# Patient Record
(demographics unavailable — no encounter records)

---

## 2018-08-19 NOTE — HISTORY & PHYSICAL
History and Physical


History & Physicial


Hp dictated # [id number]











Kehinde Martin MD Aug 19, 2018 10:04

## 2018-08-19 NOTE — HISTORY AND PHYSICAL REPORT
DATE OF ADMISSION:  08/19/2018



DATE OF EVALUATION:  08/19/2018.



CHIEF COMPLAINT:  Chest pain.



HISTORY OF PRESENT ILLNESS:  This is a 70-year-old Armenian male, who was in

his usual state of health until last night.  The patient had chest pain,

which is pressure like.  It lasted about half an hour and he was also

short of breath.  He called paramedics, was brought in, and was admitted

with possible acute coronary syndrome.



PAST MEDICAL HISTORY:  No previous history of heart problems.  He does have

history of hypertension.



MEDICATIONS:  Does not remember.



ALLERGIES:  No known drug allergies.



SOCIAL HISTORY:  The patient smoked for a long time one pack every three

days and he lives by himself.  He does not have any history of alcohol

abuse.  He stopped smoking about two months ago.



REVIEW OF SYSTEMS:  Noncontributory.



PHYSICAL EXAMINATION:

GENERAL:  The patient is an elderly male, in no acute distress.

VITAL SIGNS:  Blood pressure 128/73, pulse 74, respiratory rate 16, and

temperature 97.4.

HEENT:  Pink conjunctivae.  Anicteric sclerae.

NECK:  Supple.

LUNGS:  Clear to auscultation.

HEART:  S1, S2 without murmurs or rubs.

ABDOMEN:  Soft and nontender.

EXTREMITIES:  No cyanosis or edema.



LABORATORY FINDINGS:  The CBC shows a WBC of 8600, hematocrit is 30.9,

hemoglobin is 10.3, and platelets 146,000.  Chemistry panel shows a serum

sodium 139, potassium 4, chloride 105, CO2 27, BUN is 21, and creatinine

is 0.9.  Troponins negative x2.



ASSESSMENT:  This is a 70-year-old Armenian male with history of hypertension

and a long history of smoking although he stopped two months ago, who

presents with atypical chest pain, possible acute coronary syndrome.



PLAN:  The patient will be on telemetry.  A stress test will be obtained.

Based on stress test, we can either go home if negative.  If positive, he

would need a coronary angiogram.









  ______________________________________________

  Kehinde Martin M.D.





DR:  KATIE

D:  08/19/2018 10:04

T:  08/19/2018 22:46

JOB#:  [id number]

CC:

## 2018-08-19 NOTE — DIAGNOSTIC IMAGING REPORT
EXAM:

  XR Chest, 1 View

 

CLINICAL HISTORY:

  CP

 

TECHNIQUE:

  Frontal view of the chest.

 

COMPARISON:

  No relevant prior studies available.

 

FINDINGS:

  Lungs:  Unremarkable.  No consolidation.

  Pleural space:  Unremarkable.  No pneumothorax.

  Heart:  Unremarkable.  No cardiomegaly.

  Mediastinum:  Unremarkable.

  Bones/joints:  Unremarkable.

 

IMPRESSION:     

  Normal chest x-ray.

## 2018-08-19 NOTE — EMERGENCY ROOM REPORT
History of Present Illness


General


Chief Complaint:  Chest Pain


Source:  Patient





Present Illness


HPI


This patient c/o mild-moderate ten minutes substernal-left chest pressure. At 

rest. No similar history. He was alone, resting. He called 911. Received 

aspirin and one sl ntg. Pain reduced to 3/10. No trauma, no fever, no shortness 

of breath, no nausea, no vomiting, no diarrhea, no abdominal pain, no syncope, 

LOC, dizziness, lightheadedness, headache. No leg pain/swelling. No travel 

history.


Allergies:  


Coded Allergies:  


     No Known Allergies (Unverified , 8/19/18)





Patient History


Limited by:  language barrier





Nursing Documentation-PMH


Hx Hypertension:  Yes





Review of Systems


Constitutional:  Reports: no symptoms


Eye:  Reports: no symptoms


ENT:  Reports: no symptoms


Respiratory:  Reports: no symptoms


Cardiovascular:  Reports: see HPI, chest pain


Gastrointestinal:  Reports: no symptoms


Genitourinary:  Reports: no symptoms


Musculoskeletal:  Reports: no symptoms


Skin:  Reports: no symptoms


Psychiatric:  Reports: no symptoms


Neurological:  Reports: no symptoms


Endocrine:  Reports: no symptoms


Hematologic/Lymphatic:  Reports: no symptoms


Allergic:  Reports: no symptoms


All Other Systems:  negative except mentioned in HPI





Physical Exam





Vital Signs








  Date Time  Temp Pulse Resp B/P (MAP) Pulse Ox O2 Delivery O2 Flow Rate FiO2


 


8/19/18 01:34 97.4 74 16 128/73 98 Room Air  





 97.3       








Sp02 EP Interpretation:  reviewed, normal


General Appearance:  normal inspection, well appearing, no apparent distress, 

alert, GCS 15, non-toxic, thin


Head:  normocephalic, atraumatic


Eyes:  bilateral eye normal inspection, bilateral eye PERRL, bilateral eye EOMI


ENT:  normal ENT inspection, hearing grossly normal, normal pharynx, no 

angioedema, normal voice, moist mucus membranes


Neck:  normal inspection, full range of motion, supple, no meningismus, no bony 

tend


Respiratory:  normal inspection, lungs clear, normal breath sounds, no rhonchi, 

no respiratory distress, no retraction, no accessory muscle use, no wheezing


Cardiovascular #1:  normal inspection, regular rate, rhythm, no edema


Gastrointestinal:  normal inspection, normal bowel sounds, non tender, soft, no 

mass, non-distended


Musculoskeletal:  gait/station normal, normal range of motion


Neurologic:  normal inspection, alert, oriented x3, responsive, motor strength/

tone normal


Psychiatric:  normal inspection, judgement/insight normal, memory normal


Suicide Risk Assessment:  


   Suicidal Ideation:  No


   Had intent to initiate attempt:  No


   Pt's plan for suicide attempt:  No


   Has means to complete attempt:  No


Skin:  normal inspection, normal color, no rash, warm/dry





Medical Decision Making


Diagnostic Impression:  


 Primary Impression:  


 Chest pain


 Additional Impression:  


 ACS (acute coronary syndrome)





EKG Diagnostic Results


EKG Time:  01:57


EP Interpretation:  NSR 74 RBBB no acute ischemia


Rate:  normal


Rhythm:  NSR


ST Segments:  no acute changes


ASA given to the pt in ED:  Yes





Rhythm Strip Diag. Results


Rhythm Strip Time:  01:58


EP Interpretation:  yes


Rate:  70


Rhythm:  NSR





Chest X-Ray Diagnostic Results


Chest X-Ray Diagnostic Results :  


   Chest X-Ray Ordered:  Yes


   # of Views/Limited/Complete:  1 View


   Indication:  Chest Pain


   EP Interpretation:  Yes


   Interpretation:  no consolidation, no effusion, no pneumothorax, no acute 

cardiopulmonary disease





Last Vital Signs








  Date Time  Temp Pulse Resp B/P (MAP) Pulse Ox O2 Delivery O2 Flow Rate FiO2


 


8/19/18 01:34 97.4 74 16 128/73 98 Room Air  





 97.3       








Status:  unchanged


Disposition:  ADMITTED AS INPATIENT


Scripts


Unable to Obtain Active Prescriptions or Reported Meds











Santiago Herzog M.D. Aug 19, 2018 01:58

## 2018-08-20 NOTE — DIAGNOSTIC IMAGING REPORT
Indications: Chest pain

 

Technique: Single day single isotope protocol utilized. Initially, resting images

obtained using IV administration 9.6 millicuries 99M technetium Myoview.

Subsequently, patient underwent  lexiscan stress testing. See cardiology report for

details. During adenosine infusion, IV administration 31.9 mCi 99 M technetium

Myoview. SPECT and planar images obtained. SPECT images gated to 8 phases of the

cardiac cycle were also obtained, and reformatted into cine images for evaluation of

ejection fraction.

 

Comparison: none

 

Findings: Presence or absence of symptoms during infusion is not reported on the

cardiology report. Per cardiology report, resting EKG demonstrates normal sinus

rhythm with right bundle-branch block. Presence or absence of symptoms during

infusion is not described on the cardiology report.  Imaging, no fixed nor reversible

post stress perfusion defects are demonstrated. Normal left ventricular chamber

size.. Calculated post stress ejection fraction 77%. No focal wall motion abnormality

demonstrated.

 

Impression: Nonischemic clinical response to pharmacologic stress, per cardiology

report

 

Nonischemic electrocardiographic response to pharmacologic stress, per cardiology

report

 

No imaging findings to suggest ischemia, at level of stress achieved.

 

Calculated post stress ejection fraction greater than 70%

## 2018-08-20 NOTE — GENERAL PROGRESS NOTE
Assessment/Plan


Problem List:  


(1) Chest pain


ICD Codes:  R07.9 - Chest pain, unspecified


SNOMED:  88561002


(2) HTN (hypertension)


ICD Codes:  I10 - Essential (primary) hypertension


SNOMED:  15353122


Assessment/Plan


await stress test


cardio consult





Subjective


Allergies:  


Coded Allergies:  


     No Known Allergies (Unverified , 8/19/18)


Subjective


no CP today





Objective





Last 24 Hour Vital Signs








  Date Time  Temp Pulse Resp B/P (MAP) Pulse Ox O2 Delivery O2 Flow Rate FiO2


 


8/20/18 12:00 97.9 77 20 127/73 (91) 97   





 97.9       


 


8/20/18 10:14 98.0       


 


8/20/18 09:15 98.0       


 


8/20/18 09:00      Room Air  


 


8/20/18 08:00  55      


 


8/20/18 08:00 97.3 61 18 128/75 (92) 97   





 97.3       


 


8/20/18 04:00 98.0 61 20 137/69 (91) 99   





 98.0       


 


8/20/18 04:00  84      


 


8/20/18 00:00 97.8 60 20 119/63 (81) 98   





 97.8       


 


8/19/18 23:34  58      


 


8/19/18 21:00      Room Air  


 


8/19/18 20:00 98.6 65 20 127/72 (90) 98   





 98.6       


 


8/19/18 19:46  63      


 


8/19/18 16:00 97.2 69 20 124/61 (82) 98   





 97.2       


 


8/19/18 16:00  67      

















Intake and Output  


 


 8/19/18 8/20/18





 19:00 07:00


 


Intake Total 360 ml 


 


Balance 360 ml 


 


  


 


Intake Oral 360 ml 


 


# Voids 2 3








Laboratory Tests


8/20/18 06:15: 


Troponin I 0.001, Triglycerides Level 54, Cholesterol Level 121, LDL 

Cholesterol 55, HDL Cholesterol 64H, Cholesterol/HDL Ratio 1.9L, Thyroid 

Stimulating Hormone (TSH) 1.789


Height (Feet):  5


Height (Inches):  9.00


Weight (Pounds):  136


Cardiovascular:  normal rate


Respiratory/Chest:  lungs clear


Edema:  no edema noted Generalized











Kehinde Martin MD Aug 20, 2018 12:34

## 2018-08-22 NOTE — DISCHARGE SUMMARY
Discharge Summary


Discharge Summary


_


DATE OF ADMISSION: 08/19/2018





DATE OF DISCHARGE: 08/20/2018





REASON FOR ADMISSION: 


70 years old male with  past medical history of hypertension, came to emergency 

room for evaluation due to chest pain .


He developed substernal left sided chest pressure at rest, lasting about 10 

minutes. 


He never had these symptoms in the past. h


Patient call  paramedic and  was brought  to the hospital ED for evaluation.  


Patient received by paramedics aspirin and nitroglycerin with significant 

relief.


Patient denied any trauma to chest.


No fevers or chills.


No shortness of breath, no nausea, no vomiting, no diarrhea, no abdominal pain, 

no syncope,blackout,  dizziness, lightheadedness, headache. 


No leg pain or swelling. 


No recent  travel history.


Upon evaluation vital signs were stable.  


EKG revealed normal sinus rhythm with right bundle branch block, no acute 

ischemic changes .


Troponin   negative.  


Chest x-ray revealed no acute cardiopulmonary pathology.  


Hemoglobin 10.8 hematocrit 30.9 , no leukocytosis .


INR 1.2 


AST 63 


BUN 21,  creatinine 0.9 .


Patient denied use of  alcohol, but admitted to  long standing history of 

smoking,  quit 2 months ago.  


Patient admitted with diagnoses of chest pain, rule out acute coronary syndrome.


 


HOSPITAL COURSE: 


Patient admitted to telemetry floor.  


Patient was started  on antiplatelet therapy with aspirin.


Serial troponin were negative.  


Telemetry was negative ,no acute ischemic changes.  


Lipid panel was negative.  


Patient undergone myocardial perfusion scan test,  which was nonischemic with 

calculated ejection fraction greater than 70%.  


Pain management was addressed , and pain was controlled.  


Blood pressure was closely monitored  and remained  stable.  


Patient was counseled on cardiac diet.  


Patient was counseled to continue abstinence from smoking.  


Patient was stable for discharge.


Due to rapid and unexpected improvement in patient's condition, the patient was 

discharged in one day. 





FINAL DIAGNOSES: 


A typical chest pain 


Hypertension


Long history of smoking ( quit 2 months ago) 





 


DISCHARGE INSTRUCTIONS:


Patient was discharged home. 


Follow up with primary care provider in one  to  two weeks.





I have been assigned to dictate discharge summary for this account. I was not 

involved in the patient's management.











Mariola Pedro NP Aug 22, 2018 12:27

## 2018-08-24 NOTE — CARDIOLOGY REPORT
--------------- APPROVED REPORT --------------





EKG Measurement

Heart [id number]

OK 138P45

[id number]

[id number]

[id number]





Normal sinus rhythm

Right bundle branch block

Abnormal ECG

## 2018-08-27 NOTE — PHYSICIAN QUERY
--------- THIS DOCUMENT IS A PERMANENT PART OF THE MEDICAL RECORD ---------



*****PLEASE COMPLETE THE DOCUMENT BEFORE SIGNING*****



Dear Dr. Kehinde Martin___________        Date __08/27/18___________

/CDS' Name _SAHIL Walker, CCS      



Exercise your independent professional judgment when responding to query.  
Questions asked do not imply particular answer is desired or expected.  We 
greatly appreciate your clarification on this issue.



Clinical Documentation States:

70 years old male with  past medical history of hypertension, came to emergency 
room for evaluation due to chest pain .

He developed substernal left sided chest pressure at rest, lasting about 10 
minutes. Patient received by paramedics aspirin and nitroglycerin with 
significant relief. Patient admitted with diagnoses of chest pain, rule out 
acute coronary syndrome.



Clinical Findings Show: 

 EKG = __NSR____      Troponin = __NEG__               



Please document the suspected etiology of Chest Pain:



a.Type:      []Cardiac      []Non-cardiac        []Unspecified



b.Etiology - cardiac    

   [] Aortic dissection                    []Mitral valve prolapsed      

   [] Acute myocardial infarction               []Spasm of coronary arteries   

   [] Coronary Artery Disease                    []Pericarditis   



c.Etiology - non-cardiac

   [] Anxiety                         []Pleurisy

   [] Cancer                         []Pneumonia, type _____________ 

   [x] Costochondritis              []Pneumothorax      

   [] GERD/Esophagitis            []Pulmonary embolism   

   [] Unable to determine   

   []Other:___________________



Please also document in your Progress Notes and/or Discharge Summary and 
indicate if the condition was present on admission.



________________________                                ______________________

Brenda OROZCO                                                     Date
MTDD

## 2019-08-07 NOTE — NUR
ER Nurse Note:



Report given to SVETLANA Hahn in tele for continuity of care. Pt a&ox4, VSS, stable. 
Pt expressed pain in lower legs, ERMD aware and meds given. Pt tolerated well. 
No skin issue noted. SLIV LT hand, patent. All belongings taken with pt. Money 
counted with SVETLANA Hahn at pt side.

## 2019-08-07 NOTE — NUR
NURSE NOTES:

Received report from SVETLANA Hahn. The patient is sleeping on the bed without acute distress or 
shortness of breath. The patient's bed in the lowest position, call light in reach, and fall 
and aspiration precaution reinforced. IV site on left hand 18G intact and patent. Will 
continue plan of care.

## 2019-08-07 NOTE — NUR
NURSE NOTES:

Received report from SVETLANA Mahoney. Patient in bed awake showing no signs of acute distress. 
Respiration even and non labored on RA. No sob noted. IV on left hand 18g sl patent and 
intact. Call light within reach, Bed side rails up x2. Ndiaye patent, intact and draining. 
Bed in lowest position, wheels locked and alarm on. All needs attended and met. Will 
continue plan of care.

## 2019-08-07 NOTE — NUR
NURSE NOTES:

Called Dr. Sanket Juarez's office who is the patient's primary physician. Called two times but 
voicemail was full and unable to reach them. Will try to call again. Will continue to 
monitor the patient.

## 2019-08-07 NOTE — NUR
ER Nurse Note:



Pt BIBA RA 29 from 7 11 c/o acute onset of chest pain. Pt stated he was at home 
when he felt pain 6/10 pain on his LT chest at 0200. Pt could not sleep it off 
and went to 711 and called 911. On route, EMS established 18 gauge IV line on 
LT hand; patent. Pt given nitro and ASA. Pt a&ox4, VSS, stable. Pt chest rise 
and fall noted, lung sounds clear. Will continue to montior.

## 2019-08-07 NOTE — CONSULTATION
DATE OF CONSULTATION:  08/07/2019

CARDIOLOGY CONSULTATION



CONSULTING PHYSICIAN:  Ray Whelan M.D.



REFERRING PHYSICIAN:  Kehinde Martin M.D.



REASON FOR REFERRAL:  Chest pain.



HISTORY OF PRESENT ILLNESS:  This is a 71-year-old gentleman who presented

to the hospital for one and a half months of constant pain in the left

shoulder, left arm, and left chest area and he was admitted here, I guess

a number of months ago, back in August of 2018 for episode of chest pain,

at which time he underwent a myocardial perfusion imaging that was

apparently negative.  He presents with the same pain because that is

constantly present in this area.  Relieving exacerbating factors were

_____ seem to be with movement of the left arm and left shoulder, but not

with walking.  When he walks, he has pain in his legs, but not in his

chest.  The pain may be worse in certain positions when he lies down, but

mostly when he moves his arm, and when he leaves the left arm flat, he has

no pain or improved pain.



PAST MEDICAL HISTORY:  Positive for high blood pressure.  Denies any

diabetes.  No history of high cholesterol.  No heart attack, cancer,

stroke, hepatitis, tuberculosis, asthma, or emphysema.  No ulcers.  No

kidney problems, liver problems, thyroid problems, anemia, HIV, or AIDS.

He indicates a number of years ago, he went to an emergency room and they

told him there is nothing wrong with his brain.



SOCIAL HISTORY:  He smokes about 4 to 5 cigarettes per day.  Denies any

alcohol and denies any drugs.



REVIEW OF SYSTEMS:  GASTROINTESTINAL:  He has no problems with his GI.

GENITOURINARY:  He had some problems with his urine.  PULMONARY:  Negative

.  CONSTITUTIONAL:  Negative.



PHYSICAL EXAMINATION:

GENERAL:  Shows to be elderly male, in no respiratory distress.

HEENT:  Unremarkable.

NECK:  Supple.  No jugular venous distention.  No abdominojugular reflux

noted.

LUNGS:  Clear to auscultation and percussion.

CARDIAC:  S1 is normal.  S2 is normal.  Regular rate and rhythm.  Systolic

ejection murmur is noted.  No heaves, thrills, gallops, or rubs are

noted.

ABDOMEN:  Soft, nontender.  Positive bowel sounds.

EXTREMITIES:  There is no clubbing, cyanosis, nor is there any

edema.

NEUROLOGICAL:  He is awake, alert, and responsive and in no apparent

respiratory distress.



On palpation of his chest wall, there is no pain, but on abduction and

adduction of the arm or movement against any resistance, the pain in the

left arm and shoulder and in the chest appears to be getting worse.



LABORATORY DATA:  White count is 6.8, hemoglobin 10.6, and platelet count

of 92,000.  His sodium is 140, potassium 3.5, chloride 107, bicarbonate

23, BUN is 16, creatinine 0.9, glucose of 104, and calcium is 8.9.  Three

sets of cardiac enzymes are all negative since admission here over the

past 24 hours.  There are no x-rays performed.  Last myocardial perfusion

imaging was performed in August of 2018 and that was nonischemic.

Electrocardiographic leads are nothing to suggest any ischemia on the

myocardial perfusion imaging with an ejection fraction of 70%.  EKG showed

right bundle-branch conduction defect with secondary ST-segment changes.

There are some T-wave inversions in lead III and aVF.  In direct

comparison with the EKG performed in August of 2018, there was ST-segment

changes, appeared to be old.



ASSESSMENT AND PLAN:

1. Left arm and chest pain, likely musculoskeletal in origin.

2. History of hypertension.

3. Tobacco use disorder.

4. Thrombocytopenia.



Dr. Martin, this patient was seen in cardiac consultation.  The patient

has had this chest pain for approximately one and half months.  The

electrocardiogram is not changed.  Cardiac enzymes are all negative.

Relieving exacerbated factors and reproduction of the pain were all

consistent with probably of the musculoskeletal pain.  He does have a

history of heart murmur that needs to be evaluated with an echocardiogram,

which I will order and then consideration for chest x-ray to be performed

as well.



Dr. Martin, thank you for allowing me to see this patient.









  ______________________________________________

  Ray Whelan M.D. DR:  DMITRY

D:  08/07/2019 21:13

T:  08/07/2019 22:08

JOB#:  [id number]

CC:

## 2019-08-07 NOTE — NUR
HAND-OFF: 

Report given to SVETLANA Braswell. The patient is sleeping on the bed without acute distress or 
shortness of breath. The patient's bed in the lowest position, call light in reach, and fall 
and aspiration precaution reinforced. Endorsed plan of care.

## 2019-08-07 NOTE — CARDIOLOGY REPORT
--------------- APPROVED REPORT --------------





EKG Measurement

Heart [id number]

DE 132P12

[id number]

[id number]

[id number]





Normal sinus rhythm

Possible Left atrial enlargement

Right bundle branch block

T wave abnormality, consider inferolateral ischemia

Abnormal ECG

## 2019-08-07 NOTE — NUR
NURSE NOTES:

Pt received from Stephanie MOTA alert and oriented x4, primarily Bulgarian-speaking with no acute 
s/s of distress noted. VSS stable - 122/73, 74 HR, 97% on room air, 97.7 F, 18 RR. 2/10 
sharp midsternal chest pain which pt stated has lessened significantly from when he came in. 
IV site asymptomatic and patent on L hand 18g, saline lock. Pt reports that he usually does 
not ambulate with assistive devices but that 10/10 aching intermittent pain on bilateral 
lower extremities has made it more difficult to walk today. Reported that he has not had 
pneumonia or flu vaccine done in years. No wounds noted upon assessment but pt did have 
surgical scar on lower abdomen as well as surgical scar on lower middle of back from prior 
surgery. Belongings with patient upon admission - clothing, cap, shoes, phone, and $22 in 
cash which pt opted to keep at bedside. Bed in lowest position, call light and belongings 
within reach. 

-------------------------------------------------------------------------------

Addendum: 08/07/19 at 0637 by Jovani Augustin RN

-------------------------------------------------------------------------------

Pt could not remember home meds stating, "I take blood pressure medication but I do not 
remember the name. I remember that it's pink." Pt stated that his brother usually picks up 
his medications from the [facility] - [facility] ([phone]). RN called and left message to 
brother to obtain home meds.

## 2019-08-07 NOTE — NUR
NURSE NOTES:

Dr. Martin ordered cardiologist consult with Dr. Glynn. For left upper arm pain, 
continue Morphine as ordered. For blood pressure medication, consult with Dr. Glynn. 
Endorsed to SVETLANA Braswell.

## 2019-08-07 NOTE — CARDIOLOGY PROGRESS NOTE
Assessment/Plan


Assessment/Plan


all suggestiv eonf M/S pain 


has murmu fo AS will have echo 


[id number]





Objective





Last 24 Hour Vital Signs








  Date Time  Temp Pulse Resp B/P (MAP) Pulse Ox O2 Delivery O2 Flow Rate FiO2


 


8/7/19 20:00 97.7 69 18 121/61 (81) 98   


 


8/7/19 16:00  60      


 


8/7/19 16:00 98.6 68 18 147/77 (100) 98   


 


8/7/19 12:00  61      


 


8/7/19 12:00 99.0 64 18 135/75 (95) 97   


 


8/7/19 09:00      Room Air  


 


8/7/19 08:00  82      


 


8/7/19 08:00 97.2 74 18 110/61 (77) 95   


 


8/7/19 05:55      Room Air  


 


8/7/19 05:53 97.7 74 18 122/73 (89) 97   


 


8/7/19 05:46 98.1       


 


8/7/19 05:32 98.1 84 16 135/78 98 Room Air  


 


8/7/19 05:32 98.1 84 16 135/78 98 Room Air  


 


8/7/19 04:18  85 16   Room Air  


 


8/7/19 04:18 98.1 88 16 143/80 96 Room Air  


 


8/7/19 04:01 98.1 85 16 143/80 (101) 96 Room Air  

















Intake and Output  


 


 8/6/19 8/7/19





 18:59 06:59


 


Intake Total  154 ml


 


Balance  154 ml


 


  


 


Intake Oral  150 ml


 


IV Total  4 ml











Laboratory Tests








Test


  8/7/19


04:10 8/7/19


12:15 8/7/19


15:50 8/7/19


20:20


 


White Blood Count


  6.8 K/UL


(4.8-10.8) 


  


  


 


 


Red Blood Count


  3.15 M/UL


(4.70-6.10)  L 


  


  


 


 


Hemoglobin


  10.6 G/DL


(14.2-18.0)  L 


  


  


 


 


Hematocrit


  31.6 %


(42.0-52.0)  L 


  


  


 


 


Mean Corpuscular Volume


  100 FL (80-99)


H 


  


  


 


 


Mean Corpuscular Hemoglobin


  33.5 PG


(27.0-31.0)  H 


  


  


 


 


Mean Corpuscular Hemoglobin


Concent 33.4 G/DL


(32.0-36.0) 


  


  


 


 


Red Cell Distribution Width


  12.9 %


(11.6-14.8) 


  


  


 


 


Platelet Count


  92 K/UL


(150-450)  L 


  


  


 


 


Mean Platelet Volume


  6.4 FL


(6.5-10.1)  L 


  


  


 


 


Neutrophils (%) (Auto)


  % (45.0-75.0)


  


  


  


 


 


Lymphocytes (%) (Auto)


  % (20.0-45.0)


  


  


  


 


 


Monocytes (%) (Auto)  % (1.0-10.0)     


 


Eosinophils (%) (Auto)  % (0.0-3.0)     


 


Basophils (%) (Auto)  % (0.0-2.0)     


 


Sodium Level


  140 MMOL/L


(136-145) 


  


  


 


 


Potassium Level


  3.5 MMOL/L


(3.5-5.1) 


  


  


 


 


Chloride Level


  107 MMOL/L


() 


  


  


 


 


Carbon Dioxide Level


  23 MMOL/L


(21-32) 


  


  


 


 


Anion Gap


  10 mmol/L


(5-15) 


  


  


 


 


Blood Urea Nitrogen


  16 mg/dL


(7-18) 


  


  


 


 


Creatinine


  0.9 MG/DL


(0.55-1.30) 


  


  


 


 


Estimat Glomerular Filtration


Rate  mL/min (>60)  


  


  


  


 


 


Glucose Level


  104 MG/DL


() 


  


  


 


 


Calcium Level


  8.9 MG/DL


(8.5-10.1) 


  


  


 


 


Total Bilirubin


  1.1 MG/DL


(0.2-1.0)  H 


  


  


 


 


Direct Bilirubin


  0.3 MG/DL


(0.0-0.3) 


  


  


 


 


Aspartate Amino Transf


(AST/SGOT) 51 U/L (15-37)


H 


  


  


 


 


Alanine Aminotransferase


(ALT/SGPT) 35 U/L (12-78)


  


  


  


 


 


Alkaline Phosphatase


  79 U/L


() 


  


  


 


 


Total Creatine Kinase


  216 U/L


() 


  


  


 


 


Creatine Kinase MB


  3.3 NG/ML


(0.0-3.6) 


  


  


 


 


Creatine Kinase MB Relative


Index 1.5  


  


  


  


 


 


Troponin I


  0.003 ng/mL


(0.000-0.056) 0.013 ng/mL


(0.000-0.056) 


  0.011 ng/mL


(0.000-0.056)


 


Total Protein


  7.1 G/DL


(6.4-8.2) 


  


  


 


 


Albumin


  3.4 G/DL


(3.4-5.0) 


  


  


 


 


Globulin 3.7 g/dL     


 


Albumin/Globulin Ratio


  0.9 (1.0-2.7)


L 


  


  


 


 


Serum Alcohol < 3 mg/dL     


 


Urine Color   Yellow   


 


Urine Appearance   Clear   


 


Urine pH   6 (4.5-8.0)   


 


Urine Specific Gravity


  


  


  1.015


(1.005-1.035) 


 


 


Urine Protein


  


  


  1+ (NEGATIVE)


H 


 


 


Urine Glucose (UA)


  


  


  Negative


(NEGATIVE) 


 


 


Urine Ketones


  


  


  Negative


(NEGATIVE) 


 


 


Urine Blood


  


  


  Negative


(NEGATIVE) 


 


 


Urine Nitrite


  


  


  Negative


(NEGATIVE) 


 


 


Urine Bilirubin


  


  


  Negative


(NEGATIVE) 


 


 


Urine Urobilinogen


  


  


  4 MG/DL


(0.0-1.0)  H 


 


 


Urine Leukocyte Esterase


  


  


  Negative


(NEGATIVE) 


 


 


Urine RBC


  


  


  0-2 /HPF (0 -


0)  H 


 


 


Urine WBC


  


  


  0-2 /HPF (0 -


0) 


 


 


Urine Squamous Epithelial


Cells 


  


  None /LPF


(NONE/OCC) 


 


 


Urine Calcium Oxalate Crystals


  


  


  Many /LPF


(NONE) 


 


 


Urine Bacteria


  


  


  Few /HPF


(NONE) 


 

















Ray Whelan MD Aug 7, 2019 21:12

## 2019-08-07 NOTE — NUR
70 Y/O Male BIBA from [facility] (Store) 



CC: CP x 1 Hour (Patient Received 2 Nitro & 1 ASA in route)



SI:Acute Coronary Syndrome, CP

VS:BP:143/80 HR:88 RR:16 02 Sat:96% RA T:98.1

CXR: Pending

EKG: NSR (RBBB w/NSST Changes)





IS: 

*****Admitted to Telemetry @ 10131******

*****Telemetry Status*******



DCP: Pending Hospital Stay

## 2019-08-07 NOTE — NUR
NURSE NOTES:

Notified Dr. Martin regarding the patient's urinary retention. The patient tried to to void 
by himself by using urinal but was not able to urinate even though he feels like he needs to 
urinate. He had residual of 497mL based on the bladder scan. Notified Dr. Martin regarding 
the patient's condition three times and awaiting for call back. Will continue to monitor the 
patient and will carry out the order as soon as receives it.

## 2019-08-07 NOTE — HISTORY & PHYSICAL
History and Physical


History & Physicial


hp DICTATED # [id number]











Kehinde Martin MD Aug 7, 2019 13:26

## 2019-08-07 NOTE — NUR
NURSE NOTES:

Notified Dr. Martin regarding the patient's left upper arm pain that is sharp and similar 
pain that he experienced when he had a shingles. No skin lesion noted at this time. Will 
continue to monitor the patient. Will carry out the order as soon as receives it.

## 2019-08-07 NOTE — NUR
NURSE NOTES:

Dr. Martin ordered Ndiaye insertion with urinalysis and urine culture. Will carry out the 
order now. Will continue to monitor the patient.

## 2019-08-07 NOTE — NUR
NURSE NOTES:

Left Dr. Martin voicemail regarding cardiology consult, blood pressure medication, and left 
upper arm pain. Will carry out the order as soon as receives it. Will continue plan of care.

## 2019-08-07 NOTE — HISTORY AND PHYSICAL REPORT
DATE OF ADMISSION:  08/07/2019

CHIEF COMPLAINT:  Chest pain.



HISTORY OF PRESENT ILLNESS:  The patient is a 71-year-old [place] male with

history of hypertension.  The patient was in his usual state of health

when around 11 o'clock last night he started having chest pain.  He said

he was sitting and then tried to  the paper and then started having

severe chest pain 9/10 with some radiation to the left arm.  The patient's

pain was continuous and finally he came to the emergency room.  He still

has about 8/10 chest pain.  Note that the patient was admitted in August

of the last year also with chest pain.  At that time, the patient was

ruled out for acute MI and underwent myocardial perfusion scan test and

that was negative showing nonischemic test with calculated ejection

fraction of greater than 70%.



PAST MEDICAL HISTORY:  History of hypertension.  No history of diabetes.

No previous history of heart disease according the patient.



MEDICATIONS:  Reviewed in the EMR.



SOCIAL HISTORY:  The patient smokes, has been smoking for many years.

Currently he says he smokes about 5 cigarettes a day.



ALLERGIES:  No known drug allergies.



REVIEW OF SYSTEMS:  As above.



PHYSICAL EXAMINATION:

GENERAL:  The patient is an elderly male, in no acute distress.

VITAL SIGNS:  Blood pressure 110/61, pulse 74, respirations 18, and

temperature 97.2.

HEENT:  Pale conjunctivae.  Anicteric sclerae.

NECK:  Supple.

LUNGS:  Clear to auscultation.

HEART:  S1 and S2 without murmurs or rubs.

ABDOMEN:  Soft, nontender.

EXTREMITIES:  No cyanosis or edema.



LABORATORY FINDINGS:  CBC shows a WBC of 6800, hematocrit 31.6, hemoglobin

is 10.6, and platelet is 92,000.  Chemistry panel shows serum sodium 140,

potassium 3.5, chloride 107, CO2 23, BUN 16, creatinine 0.9, calcium is

8.9.  First troponin was negative.  EKG was nonspecific.



ASSESSMENT:  This is a 71-year-old [place] male, admitted with atypical

chest pain.  His risk factors include hypertension and also smoking.  He

had a negative stress test last year.



PLAN:  The patient will be ruled out for myocardial infarction.  He will be

in telemetry unit.  Cardiology consultation was obtained.  He will likely

need to have another stress test.  Put the patient on Pepcid daily and

further adjustment will be made in the patient's regimen.  The patient was

also started on daily aspirin.









  ______________________________________________

  Kehinde Martin M.D.





DR:  Chante

D:  08/07/2019 13:27

T:  08/07/2019 16:33

JOB#:  [id number]

CC:

## 2019-08-08 NOTE — NUR
08/08...Pt refusing these MRI exams, says problem is in low back. SVETLANA Velazquez left message to 
Dr. Martin. tjb 15:29

## 2019-08-08 NOTE — NUR
HAND-OFF: 

Report given to SVETLANA Klein.  Patient resting in bed.  Belonging checklist completed.

## 2019-08-08 NOTE — GENERAL PROGRESS NOTE
Assessment/Plan


Assessment/Plan:


start Ibuprofen


PRN Norco


Dc today


Discussed with Dr Whelan





Subjective


Allergies:  


Coded Allergies:  


     No Known Allergies (Unverified , 8/19/18)


Subjective


still with pain but bettr





Objective





Last 24 Hour Vital Signs








  Date Time  Temp Pulse Resp B/P (MAP) Pulse Ox O2 Delivery O2 Flow Rate FiO2


 


8/8/19 12:00 98.6 68 20 121/69 (86) 97   


 


8/8/19 11:51 98.2       


 


8/8/19 11:45  66      


 


8/8/19 09:00      Room Air  


 


8/8/19 08:59 98.2       


 


8/8/19 08:00 98.2 79 20 118/63 (81) 95   


 


8/8/19 07:42  75      


 


8/8/19 04:00  74      


 


8/8/19 04:00 98.0 65 18 150/71 (97) 96   


 


8/8/19 00:00 97.7 74 19 102/70 (81) 97   


 


8/7/19 21:00      Room Air  


 


8/7/19 20:00 97.7 69 18 121/61 (81) 98   


 


8/7/19 20:00  67      


 


8/7/19 16:00  60      


 


8/7/19 16:00 98.6 68 18 147/77 (100) 98   

















Intake and Output  


 


 8/7/19 8/8/19





 19:00 07:00


 


Intake Total 500 ml 


 


Output Total 800 ml 520 ml


 


Balance -300 ml -520 ml


 


  


 


Intake Oral 500 ml 


 


Output Urine Total 800 ml 520 ml








Laboratory Tests


8/7/19 15:50: 


Urine Color Yellow, Urine Appearance Clear, Urine pH 6, Urine Specific Gravity 

1.015, Urine Protein 1+H, Urine Glucose (UA) Negative, Urine Ketones Negative, 

Urine Blood Negative, Urine Nitrite Negative, Urine Bilirubin Negative, Urine 

Urobilinogen 4H, Urine Leukocyte Esterase Negative, Urine RBC 0-2H, Urine WBC 0-

2, Urine Squamous Epithelial Cells None, Urine Calcium Oxalate Crystals Many, 

Urine Bacteria Few


8/7/19 20:20: Troponin I 0.011


8/8/19 06:16: 


Troponin I 0.000, Triglycerides Level 76, Cholesterol Level 151, LDL 

Cholesterol 62, HDL Cholesterol 71H, Cholesterol/HDL Ratio 2.1L, Thyroid 

Stimulating Hormone (TSH) 3.629


Height (Feet):  5


Height (Inches):  8.00


Weight (Pounds):  164











Kehinde Martin MD Aug 8, 2019 14:06

## 2019-08-08 NOTE — NUR
NURSE NOTES:



Received report from SVETLANA Braswell. Patient is in bed awake eating breakfast. Respiration even 
and non labored on RA. No sob noted.  Patient on cardiac monitor per protocol. IV on left 
hand 18g sl patent and intact. Call light within reach, Bed side rails up x2. Dniaye patent, 
intact and draining. Bed in lowest position, wheels locked and alarm on. All needs attended 
and met. Will continue plan of care.

## 2019-08-08 NOTE — NUR
NURSE NOTES:

1515- Made attempt to call Dr. ELI Martin.  patient stated he wants MRI of lower back instead 
of what doctor order.  Called office. Awaiting call back.

-------------------------------------------------------------------------------

Addendum: 08/08/19 at 1715 by Marcus Schultz RN

-------------------------------------------------------------------------------

15:30 MRI closed at 15:30.  Rescheduled tomorrow.

## 2019-08-08 NOTE — NUR
NURSE NOTES:

pt left AMA. Explained risk and benefit but pt still insisting to leave. GRZEGORZ White, 
Supervisor made aware.

## 2019-08-08 NOTE — NUR
NURSE NOTES:

Spoke to Dr. Perkins about patient request for MRI of lower back.  Dr. ELI Martin states to 
continue with the ordered MRI.

## 2019-08-09 NOTE — DIAGNOSTIC IMAGING REPORT
Indication: Reason For Exam: CP

 

Technique:  Single AP view of the chest.

 

Comparison: Chest radiograph dated 8/19/2018

 

Findings:

 

The cardiomediastinal silhouette is within normal limits when accounting for

projection and technique. There is no focal consolidation, pneumothorax or pleural

effusion. Osseous structures demonstrate no acute abnormality.

 

2 mm rounded density in the right lower lung is seen on prior examination and likely

represents calcified granuloma.

 

IMPRESSION:

 

No acute cardiopulmonary process.

## 2019-08-12 NOTE — CARDIOLOGY REPORT
--------------- APPROVED REPORT --------------





EXAM: Two-dimensional and M-mode echocardiogram with Doppler and color Doppler.



INDICATION

Atherosclerosis



M-Mode DIMENSIONS 

IVSd1.2 (0.7-1.1cm)Left Atrium (MM)4.0 (1.6-4.0cm)

LVDd4.3 (3.5-5.6cm)Aortic Root2.8 (2.0-3.7cm)

PWd1.0 (0.7-1.1cm)Aortic Cusp Exc.1.6 (1.5-2.0cm)

IVSs1.9 cm

LVDs3.2 (2.5-4.0cm)

PWs1.1 cm





Normal left ventricular chamber size, systolic function and wall motion.

Left ventricular ejection fraction estimated to be  60-65 %.

Mild left ventricular hypertrophy.

No evidence of pericardial effusion. 

Left atrial size at upper limits of normal.

Right cardiac chamber sizes are within normal limits.

Aortic valve calcification with decreased cusp excursion c/w aortic stenosis.

Thickened mitral valve leaflets with normal excursion.

Mitral annulus and aortic root calcification.

Pulmonic valve not well visualized.

Normal tricuspid valve structure. 

IVC at normal size with physiologic collapse.



A  color flow and spectral Doppler study was performed and revealed:

No aortic regurgitation.

Peak aortic valve gradient of 28 mmHg and a mean of 14  mmHg.

Aortic valve area  1.4 cm2 calculated by continuity equation.

Trace mitral regurgitation.

Mitral diastolic velocities suggest reduced left ventricular relaxation c/w mild LV 

diastolic dysfunction (Grade I). 

Trace tricuspid regurgitation.

Tricuspid  systolic velocities suggests peak right ventricular systolic pressure of 27 

mmHg.

Pulmonic regurgitation present.

## 2019-10-25 NOTE — NUR
NURSE NOTES:

Received report from MATI Whiting RN. Patient transferred to tele unit from ER via [facility] 
with 2 staff member assist without incidence. Patient alert and talkative, no signs of pain 
or distress noted. Patient able to make needs known to limited degree, forgetful of 
abilities. Patient's belongings list checked with ER nurse at bedside. IV site checked, 
intact and patent, no signs of erythema, bleeding, or infiltration. Patient's skin assessed, 
skin is intact, except for abrasions on left forearm. Patient placed on tele monitor, 
tolerated well. 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 Admission 
orders received from Dr. Novak. Bed in lowest position, side rails up x3, brakes on, call 
light within reach, and bed alarm on. Will continue with plan of care.

## 2019-10-25 NOTE — NUR
ED Nurse Note:



pt presents to ED with CP  x 8 hours. pt was given ASA en route per EMS. pt 
reports 8/10 pain that radiates into his L arm. he denies taking anything 
himself PTA. pt is TB positive

## 2019-10-25 NOTE — NUR
ED Nurse Note:

Received pt from SVETLANA Rashid. Pt in stable condition. AAox3, vss, no acute 
distress. Blood work and urine sent to lab.

## 2019-10-25 NOTE — NUR
TRANSFER TO FLOOR:

Patient transferred to  as ordered, per MD Amy.   Report given to SVETLANA Briceño.  Belongings and medications given to

Patient. Family and or S/O informed of transfer.

## 2019-10-25 NOTE — DIAGNOSTIC IMAGING REPORT
EXAM:

  XR Chest, 1 View

 

CLINICAL HISTORY:

  Chest pain

 

TECHNIQUE:

  Frontal view of the chest.

 

COMPARISON:

  8 7 2019

 

FINDINGS:

  Lungs:  Unremarkable.  No consolidation.

  Pleural space:  Unremarkable.  No pneumothorax.

  Heart:  Stable cardiomediastinal silhouette.

  Mediastinum:  Stable retrocardiac opacity, likely a hiatal hernia.

  Bones joints:  No acute osseous abnormality.  Postsurgical changes are 

present in the lower cervical spine.

 

IMPRESSION:     

  No acute cardiopulmonary process.

## 2019-10-25 NOTE — NUR
ED Nurse Note:

Sid baca confirmed with Dr. Cruz that cancelled the ASA order.  pt 
already received it from EMS.

## 2019-10-26 NOTE — NUR
NURSE NOTES:

Patient with no complaints at this time. No s/sx of distress. RR even and unlabored on RA. 
Patient eating breakfast in bed. Bed low and locked. Bed alarm on. Call light within reach. 
Will continue to monitor.

## 2019-10-26 NOTE — NUR
NURSE NOTES:

Received report from CHRISTINA Ames RN. patient in bed AAO X3-4 with no complaints of acute pain 
or distress noted at this time. On Airborne precaution to R/O TB, AFB smear collect order 
active. Patient ambulates with minimal assistance. IV line intact and patent, with 
continuous cardiac monitoring per protocol. Safety precaution in place; siderails X3 up, 
call light within reach, bed in lowest position, brakes and alarm on at all times. Needs and 
wants anticipated and attended. Will continue plan of care.

## 2019-10-26 NOTE — HISTORY AND PHYSICAL REPORT
DATE OF ADMISSION:  10/25/2019

CHIEF COMPLAINT:  Possible tuberculosis.



HISTORY OF PRESENT ILLNESS:  The patient is an elderly  male.  He

currently resides in a skilled nursing facility.  He had a history of BPH,

degenerative disc disease, hypertension, thrombocytopenia, anemia, GERD,

and spinal stenosis.  He was transferred to the emergency room for

evaluation for possible tuberculosis at the skilled nursing facility.  The

patient had a routine PPD that was positive, had a QuantiFERON gold blood

test that was also positive.  The chest x-ray showed atelectasis and

changes in the bilateral upper lungs and right apical collapse and

fibrosis.  There was concern about possible tuberculosis and the patient

is now admitted for further evaluation for possible TB.  The patient

denies any fevers or chills.  He has had no night sweats.  Denies any

hemoptysis or cough.  Denies any weight loss.



PAST MEDICAL HISTORY:  As above.



PAST SURGICAL HISTORY:  None.



CURRENT MEDICATIONS:  Reconciled and reviewed.



ALLERGIES:  None.



FAMILY HISTORY:  None.



SOCIAL HISTORY:  Negative for tobacco, ethanol, or drugs.



REVIEW OF SYSTEMS:  GENERAL:  No fevers, chills, or night sweats.    HEENT:

No headaches or visual changes.  CARDIOPULMONARY:  No chest pain or

shortness of breath.  GASTROINTESTINAL:  No nausea or vomiting.

GENITOURINARY:  No urgency or frequency.  MUSCULOSKELETAL:  No joint pain

or swelling.   NEUROLOGIC:  No evidence of seizures.



PHYSICAL EXAMINATION:

VITAL SIGNS:  Temperature 98.2, pulse 67, respirations 20, and blood

pressure _____.

GENERAL:  The patient is well developed, no apparent distress.

HEART:  Regular rate and rhythm.

LUNGS:  Clear.

ABDOMEN:  Soft, nontender, nondistended.

EXTREMITIES:  Without clubbing, cyanosis, or edema.



LABORATORY AND DIAGNOSTIC DATA:  Chest x-ray here was reportedly clear.

The chest x-ray at the hospital also shows a right apical collapse and

fibrosis.  White count 6, hemoglobin 12, platelet count of 100.  Sodium

143, potassium is 4.1, chloride 108, bicarb 24, BUN 21, and creatinine

0.8.



ASSESSMENT:  This is a pleasant male with multiple medical problems,

admitted with a positive chest x-ray, positive PPD, and QuantiFERON

gold.



PLAN:  Respiratory isolation.  We will check a CT scan of the chest.  If CT

scan of the chest is abnormal, we will collect sputum AFB cultures and

stains.  ID consultation has been obtained.  Cardiology has also been

asked to evaluate the patient because of his complaints of intermittent

chest pain.









  ______________________________________________

  Luis Novak M.D.





DR:  THEA

D:  10/26/2019 09:14

T:  10/26/2019 22:45

JOB#:  [id number]

CC:

## 2019-10-26 NOTE — DIAGNOSTIC IMAGING REPORT
EXAM:

  CT Chest Without Intravenous Contrast

 

CLINICAL HISTORY:

  INFECT

 

TECHNIQUE:

  Axial computed tomography images of the chest without intravenous 

contrast.  Sagittal and coronal reformatted images were created and 

reviewed.  CTDI is 16.10 mGy and DLP is 723.50 mGy-cm.  One or more of 

the following dose reduction techniques were used: automated exposure 

control, adjustment of the mA and or kV according to patient size, use of 

iterative reconstruction technique.

 

COMPARISON:

  Chest x-ray dated 10 25 19

 

FINDINGS:

  Lungs:  Minimal dependent atelectasis in bilateral lower lobes.  4 mm 

calcified granuloma in the right middle lobe (series 3 image 40).

  Pleural space:  Unremarkable.  No pneumothorax.  No significant 

effusion.

  Heart:  Coronary artery calcifications.  Mild valvular calcifications 

in the aortic and mitral valves.  No significant pericardial effusion.

  Bones joints:  Unremarkable.  No acute fracture.  No dislocation.

  Soft tissues:  Unremarkable.

  Vasculature:  Atherosclerotic calcifications throughout the thoracic 

aorta and upper abdominal aorta without evidence of aneurysm.

  Lymph nodes:  Unremarkable.  No enlarged lymph nodes.

  Gallbladder and bile ducts:  Biliary stent in place.

  Kidneys and ureters:  Partial visualization of multiple renal cortical 

cysts, largest measuring 4.3 x 2.7 cm in the right mid kidney with cyst 

septations and coarse calcifications.

 

IMPRESSION:     

1.  Minimal dependent atelectasis in bilateral lower lobes.  Otherwise no 

pulmonary consolidation or effusion.

2.  The retrocardiac opacity seen on the comparison chest x-ray is due to 

a tortuous distal thoracic aorta.  Associated scattered atherosclerotic 

calcifications throughout the aorta and associated coronary artery 

calcifications.

3.  Biliary stent in place.

4.  Partial visualization of multiple renal cortical cysts, largest 

measuring 4.3 x 2.7 cm in the right mid kidney with cyst septations and 

coarse calcifications.  This dominant cyst can be classified as a Bosniak 

category 2F cyst.  If no prior comparison CT or MRI exams of the abdomen 

and pelvis are available, then ACR White Paper guidelines (Maria A, et al.

 JACR 2010; 7(72):189-33) suggest abdominal CT or MRI follow-up in 6 

months.

## 2019-10-26 NOTE — NUR
NURSE NOTES:

Patient complaining of chest pain. Dr. Novak at bedside. Per MD patient does not require 
nitro since pain goes away with movement. Pain medication given.

## 2019-10-27 NOTE — NUR
NURSE NOTES:

Aura from [facility] called this station and spoke with this nurse. Per Aura 
informed this nurse that patient will not be [facility] back [facility] and 
their DON is searching for a new facility where patient may bed accepted. Contacted and 
informed Dr. Novak of situation. This nurse inquired if patient may be downgraded to 
medical surgical floor while patient awaits acceptance to skilled nursing facility. Dr. Novak acknowledged and stated "ok". Orders entered, noted, and carried out. Nurse 
supervisor made aware. Primary nurse made aware.

## 2019-10-27 NOTE — NUR
NURSE NOTES:

Called and spoke with Aura from [facility] Tel: [phone], per Aura 
received fax of patient's H&P, medication reconciliation, diagnostic imaging results. Per 
Zaira will need to speak to her DON if patient is okay to be admitted in [facility]. Noted. Will 
follow up.

## 2019-10-27 NOTE — NUR
NURSE NOTES:

Patient noted with discharge order from Dr. Novak. Called and notified [facility] 
of discharge. Per Mindy DOUGLAS unable to accept patient at this time. Provided this nurse 
contact for [facility] and to speak with Jenise . Tel: 
[phone]. Per Jenise patient is accepted to facility and is going to room 3A, give 
report to SVETLANA Borrero. Per patient's brother Jabari Reese okay for patient to go to [facility]. Per Dr. Novak patient is okay to go to [facility]. Made nurse supervisor aware. Noted.

## 2019-10-27 NOTE — NUR
NURSE NOTES:

Dr. Novak at nurse station order patient to be discharge back to [facility]. 
Contacted Dr. ROBSON Martin if patient is cleared for discharge and cleared for rule out TB and 
if public health needed to be contacted. Dr. ROBSON Martin acknowledged and stated patient is 
cleared for discharge from infectious disease perspective, patient no longer needs to be 
ruled out for TB as that it is pneumonia related, public health does not need to be 
contacted. Danni Arevalo of infectious disease department also made aware of situation, stated 
if primary and infectious disease are in agreement to discharge patient then patient is okay 
to be discharged. Noted. Primary nurse is aware. Will proceed with discharge.

## 2019-10-27 NOTE — NUR
NURSE NOTES:

Dr. Novak at nurse station made aware that Dr. ROBSON Martin cleared patient for discharge and 
public health does not to be contacted. Also informed Dr. Novak that per Danni of infectious 
disease department in regards to calling public [facility] for R/O TB, if primary and infectious 
disease agree to discharge patient, patient maybe be discharged. Noted.

## 2019-10-27 NOTE — NUR
NURSE NOTES:

Patient attempted to leave hospital to go home. Per patient, doctor said he doesn't have TB 
so he can go home now. Patient explained that he has not been discharged. Patient verbalized 
understanding but trying to get out of room again. Dr. Novak contacted.

## 2019-10-27 NOTE — NUR
NURSE NOTES:

Called Dr. ROBSON Martin and followed up regarding isolation as that patient is cleared from 
infectious disease perspective. Per Dr. ROBSON Martin discontinue airborne isolation. Order 
entered, noted, and carried out. Will continue to monitor patient.

## 2019-10-27 NOTE — NUR
NURSE NOTES:

Called patient's brother Jabari Reese to follow up if brother is okay to be discharged to 
[facility]. Brother agreed. Made Dr. Novak aware. Per Dr. Novak is 
okay to transfer patient to [facility]. Noted.

## 2019-10-27 NOTE — NUR
NURSE NOTES:

Called [facility] and gave report to SVETLANA Mei. Setup ride with 
[employer]. Per Arron ETA is 1530. Noted.

## 2019-10-27 NOTE — CONSULTATION
DATE OF CONSULTATION:  10/26/2019

INFECTIOUS DISEASE CONSULTATION



CONSULTING PHYSICIAN:  Jeremy Martin M.D.



PRIMARY ATTENDING PHYSICIAN:  Josue Reyes M.D.



REASON FOR CONSULT:  Positive PPD.



HISTORY OF PRESENT ILLNESS:  This is a 71-year-old [place] male, who is a

nursing home resident, admitted yesterday with chest pain, also had a

positive PPD test and QuantiFERON.



PAST MEDICAL HISTORY:  Significant for hypertension, hyperlipidemia,

degenerative disk disease, thrombocytopenia, anemia, gastroesophageal

reflux disease, spinal stenosis.



The patient has no fever, chills, night sweats.



SOCIAL HISTORY:  .  He is a nursing home resident.  No history of

drug or alcohol abuse.  Smoking a couple of cigarettes per day.



ALLERGIES:  No known drug allergies.



MEDICATIONS:  Baclofen, hydralazine, Coumadin, albuterol, Norco,

nitroglycerin,\.



REVIEW OF SYSTEMS:  No fever, no chills.  No coughing.  No shortness of

breath.  No chest pain.  left left shoulder.  No nausea.

No vomiting.  No diarrhea.  No problem passing urine.



PHYSICAL EXAMINATION:

VITAL SIGNS:  Temperature 97.7, pulse 69, blood pressure 119/69.

GENERAL APPEARANCE:  No acute distress.

HEAD AND NECK:  Pink conjunctivae.  No oral lesion.

HEART:  Normal rate.

LUNGS:  Clear.

ABDOMEN:  Soft, nontender.

EXTREMITIES:  No edema.

NEUROLOGIC:  Awake and alert.  He is slow to response.



LABORATORY AND DIAGNOSTIC DATA:  WBC 6, hemoglobin 12, hematocrit 34.5, and

platelets 100,000.  Sodium 143, potassium 4.1, chloride 108, bicarbonate

24, BUN 21, creatinine 0.8, glucose is 102.  AST is 49.  Lipase 146.  The

patient's CT scan of the chest showed atelectasis  & granuloma in right middle

lobe, coronary artery calcification, mild valvular calcification, multiple

renal cysts, biliary stent.



IMPRESSION:  Positive PPD and TB gold test.  Likely, the patient has latent TB.

The patient does not have active pneumonia at the present time.  No chest

pain, had a previous admission for chest pain in 08/2019, hypertension,

hyperlipidemia, thrombocytopenia, mild anemia, spinal stenosis,

gastroesophageal reflux disease.



RECOMMENDATIONS:  The patient does not need to be in contact isolation.

Also, does not suggest a sputum culture.  May consider the patient giving

INH for reactivation of TB.



At the end of my exam, I thank, Dr. Novak, for involving me in the care

of this patient.









  ______________________________________________

  Jeremy Martin M.D.





DR:  JAMAL

D:  10/26/2019 16:02

T:  10/27/2019 04:43

JOB#:  [id number]

CC:



EMILIANO

## 2019-10-27 NOTE — NUR
NURSE NOTES:

Per Dr. ROBSON Martin pt cleared for discharge. Dr. Novak at bedside also stated he is cleared 
for discharge to [facility]. Contacting department of [facility] for clearance.

## 2019-10-27 NOTE — NUR
NURSE NOTES:

Patient received from Ryan PRICE RN. Patient stable with no complaints of pain and no s/sx of 
distress. RR even and unlabored on RA. Call light within reach. Bed low and locked. Will 
continue to monitor.

## 2019-10-27 NOTE — DISCHARGE SUMMARY
DATE OF ADMISSION:  10/25/2019



DATE OF DISCHARGE:  10/27/2019



ADMISSION DIAGNOSES:

1. Possible tuberculosis.

2. History of hypertension.

3. Chronic back pain.



DISCHARGE DIAGNOSES:

1. Possible tuberculosis.

2. History of hypertension.

3. Chronic back pain.



HOSPITAL COURSE:  The patient  is an elderly  male, who was admitted

for rule out tuberculosis.  He had a positive PPD test and abnormal chest

x-ray at the [facility] nursing [place] [facility].  He was admitted.  He was initially

placed in respiratory isolation.  He had no symptoms of active TB.  No

night sweats.  No cough.  No fevers.  No hemoptysis.  No weight loss.  His

chest x-ray was clear.  He had a CAT scan of the chest that showed no

evidence of any infiltrate.  No cavitary disease, only an old calcified

granuloma.  The patient was seen by ID.  He was cleared for discharge back

to the [facility] nursing facility.



DISCHARGE MEDICATIONS:  Please see discharge medication list for discharge

medications.



DIET:  Cardiac diet.



ACTIVITIES:  Ad-masoud.



FOLLOWUP:  The patient will be followed by his PMD at the [facility].









  ______________________________________________

  Luis Novak M.D.





DR:  JAI

D:  10/27/2019 08:31

T:  10/27/2019 18:19

JOB#:  [id number]

CC:

## 2019-10-27 NOTE — NUR
HAND-OFF: 

Report given to CHRISTINA Ames RN. Patient in bed with no S/S of distress noted. Endorsed plan 
of care.

## 2019-10-27 NOTE — NUR
NURSE NOTES:

Patient discharged to [facility] per Dr. Novak's orders. Gave report 
to Hamida. Patient given all discharge instructions and verbalized understanding. IV access 
removed, no active bleeding noted. ID band removed and placed in shredder. Heart monitor 
removed and returned to monitor tech. Patient left via ambulance with 2 EMTs with all 
belongings.

## 2019-10-28 NOTE — CONSULTATION
DATE OF CONSULTATION:  10/27/2019

CARDIOLOGY CONSULTATION



CONSULTING PHYSICIAN:  Josue Reyes M.D.



REFERRING PHYSICIAN:  Luis Novak M.D.



REASON:  Chest pain



HISTORY OF PRESENT ILLNESS:  This is a 71-year-old  male

who was admitted to the hospital 2 days ago because of concerns over

active tuberculosis based on some outpatient studies including a positive

PPD and QuantiFERON gold test; however, workup regarding that issue by

Infectious Disease consultant has suggested the absence of any active

tuberculosis and no further workup is planned at this time.  The patient

has complained of intermittent chest pain prompting this consultation.



He has not had any cough, sputum production, fevers, or chills.  No

palpitation.



PAST MEDICAL HISTORY:  Notable for hypertension, spinal stenosis due to

degenerative disk disease, history of thrombocytopenia, chronic anemia,

gastroesophageal reflux disease, prostatic hypertrophy, degenerative

aortic valve disease.



ALLERGIES:  None.



MEDICATIONS:  Reviewed.



FAMILY HISTORY:  Noncontributory.



SOCIAL HISTORY:  No record of smoking, alcohol, or substance abuse.



REVIEW OF SYSTEMS:  There is no history of myocardial infarction.  No

history of rheumatic heart disease.  No history of pericardial effusion.

An echocardiogram done at this facility in August of 2019 revealed normal

ejection fraction, mild aortic valve stenosis with area of 1.4 cm2, and no

pulmonary hypertension.



PHYSICAL EXAMINATION:

VITAL SIGNS:  Blood pressure 148/80, heart rate 69, respiratory rate 20, no

fevers.

NECK:  Supple.  Jugular venous pressure normal.

CHEST:  Chest wall some tenderness to palpation.

LUNGS:  Clear.

CARDIAC:  Regular rhythm and rate.  Normal S1, S2 with a 1/6 systolic

murmur at the base.

ABDOMEN:  Soft, nontender.

EXTREMITIES:  Without edema.



DIAGNOSTIC DATA:  CAT scan of the chest revealed atelectasis in the lower

lobes, no pulmonary consolidation, tortuous aorta, biliary stent, and

renal cysts.  EKG on admission revealed sinus rhythm at 64 beats per

minute with no abnormalities other than borderline prolongation of QT

interval.



LABORATORY DATA:  Notable for normal troponin level and normal natriuretic

peptide assay.



IMPRESSION:

1. No active tuberculosis.  No indication for respiratory isolation.

2. Noncardiac chest pain.

3. Degenerative aortic valve disease of no hemodynamic significance at

this time with mild stenosis.

4. Hypotension with slightly elevated systolic blood pressure range.



PLAN:

1. Can be followed as an outpatient from cardiovascular standpoint.

2. Further blood pressure monitoring indicated and possible advancement

of antihypertensive regimen if systolic pressure remains above 140.

3. Repeat echocardiogram in 6 months to reassess aortic valve

gradient.









  ______________________________________________

  Josue Reyes M.D.





DR:  MILAGROS

D:  10/28/2019 00:17

T:  10/28/2019 00:33

JOB#:  [id number]

CC:

## 2019-10-29 NOTE — CARDIOLOGY REPORT
--------------- APPROVED REPORT --------------





EKG Measurement

Heart [id number]

SD 142P40

[id number]

[id number]

[id number]





Normal sinus rhythm

RSR' or QR pattern in V1 suggests right ventricular conduction delay

Borderline ECG

## 2019-11-03 NOTE — CODER PHYSICIAN QUERY
Clarification is required for compliance, coding accuracy, and to reflect 
severity of illness for this patient.



Dear                              Date:11/03/19





A posssible diagnois of_Tuberculosis_was made in the medical record. Upon review
, it is difficult to determine whether this diagnosis has been ruled in, ruled 
out,or is still being worked up. Please indicate below the status of the 
aforementioned diagnosis.



The patient  is an elderly  male, who was admitted for rule out 
tuberculosis.  He had a positive PPD test and abnormal chest x-ray at the 
[facility].  He was admitted.  He was initially placed in 
respiratory isolation.  He had no symptoms of active TB.  No night sweats.  No 
cough.  No fevers.  No hemoptysis.  No weight loss.  His chest x-ray was clear.
  He had a CAT scan of the chest that showed no evidence of any infiltrate. 



Please indicate below the status of the aforementioned diagnosis.





[x] Treated and resolve                           

[] Presumed and treated

[] Currently under treatment

[] Still being worked-up

[] Ruled out







If tuberculosis was ruled out, please state the cause of the patient's chest 
pain.





_________________                                    _____________

Physician signature                                       Date



Please also document in your Progress Notes and/or Discharge Summary and 
indicate if the condition was present on admission.







 

EIMLIANO

## 2019-12-24 NOTE — DIAGNOSTIC IMAGING REPORT
EXAM:

  CT Angiography Chest With Intravenous Contrast

 

CLINICAL HISTORY:

  PE

 

TECHNIQUE:

  Axial computed tomographic angiography images of the chest with 

intravenous contrast.  CTDI is 45.2 mGy and DLP is 638.5 mGy-cm.  One or 

more of the following dose reduction techniques were used: automated 

exposure control, adjustment of the mA and/or kV according to patient 

size, use of iterative reconstruction technique.

  MIP reconstructed images were created and reviewed.

 

COMPARISON:

  Chest CT 10/26/2019

 

FINDINGS:

  Pulmonary arteries:  No pulmonary embolism.

  Aorta:  No acute findings.  No thoracic aortic aneurysm.

  Lungs:  Unremarkable.  No mass.  No consolidation.

  Pleural space:  Unremarkable.  No significant effusion.  No 

pneumothorax.

  Heart:  Coronary artery calcifications.  Mitral annular calcifications. 

 Aortic valve calcifications.  No significant pericardial effusion.  No 

evidence of RV dysfunction.

  Bones/joints:  No acute fracture.  No dislocation.

  Soft tissues:  Unremarkable.

  Lymph nodes:  Unremarkable.  No enlarged lymph nodes.

  Liver:  Morphologic changes of cirrhosis within the liver with 

prominent gastric varices.

  Gallbladder and bile ducts:  Stent within the common bile duct.

 

IMPRESSION:     

1.  No pulmonary embolism.

2.  No acute process in the lungs.

3.  Morphologic changes of cirrhosis within the liver with prominent 

gastric varices.

## 2019-12-24 NOTE — DIAGNOSTIC IMAGING REPORT
EXAM:

  XR Chest, 1 View

 

CLINICAL HISTORY:

  CP

 

TECHNIQUE:

  Frontal view of the chest.

 

COMPARISON:

  Chest x-ray 10/25/2019

 

FINDINGS:

  Lungs:  Unremarkable.  No consolidation.

  Pleural space:  Unremarkable.  No pneumothorax.

  Heart:  Unremarkable.  No cardiomegaly.

  Mediastinum:  Unremarkable.

  Bones/joints:  Unremarkable.

 

IMPRESSION:     

  No acute cardiopulmonary abnormality.

## 2019-12-24 NOTE — EMERGENCY ROOM REPORT
History of Present Illness


General


Chief Complaint:  Chest Pain





Present Illness


HPI


71-year-old male with history of hypertension, liver cirrhosis, hep C, spinal 

stenosis, BPH, hepatic encephalopathy, chest pain, shortness of breath, left 

rotator cuff tear, arthropathy, GERD who presents with left-sided sharp chest 

pain 15 minutes prior to arrival.  Patient took 1 sublingual nitroglycerin at 

home, which did not help symptoms.  EMS gave 325 aspirin and 3 sprays of 

nitroglycerin with no improvement.  Patient notes associated shortness of 

breath.  He reports pain as 7 out of 10 at this time


Allergies:  


Coded Allergies:  


     No Known Allergies (Unverified , 8/19/18)





Nursing Documentation-PMH


Hx Cardiac Problems:  Yes


Hx Hypertension:  Yes


Hx Cancer:  No


Hx Gastrointestinal Problems:  No


Hx Neurological Problems:  No





Review of Systems


Constitutional:  Denies: chills, fever


Respiratory:  Reports: shortness of breath; Denies: cough


Cardiovascular:  Reports: chest pain; Denies: palpitations


Gastrointestinal:  Denies: diarrhea, vomiting


Genitourinary:  Denies: hematuria, pain


Musculoskeletal:  Denies: joint swelling


Skin:  Denies: rash, lesions


Neurological:  Denies: headache, dizziness





Physical Exam





Vital Signs








  Date Time  Temp Pulse Resp B/P (MAP) Pulse Ox O2 Delivery O2 Flow Rate FiO2


 


12/24/19 18:36 99.0 77 18 136/86 (103) 100 Room Air  








Sp02 EP Interpretation:  reviewed


General Appearance:  well appearing, no apparent distress, non-toxic


Head:  normocephalic, atraumatic


Eyes:  bilateral eye normal inspection


ENT:  hearing grossly normal, EOM grossly intact, moist mucus membranes


Neck:  supple


Respiratory:  lungs clear, normal breath sounds, no respiratory distress, 

speaking full sentences


Cardiovascular #1:  regular rate, rhythm, no edema, no gallop, normal capillary 

refill


Cardiovascular #2:  2+ radial (R), 2+ radial (L)


Gastrointestinal:  soft, non-distended


Rectal:  deferred


Musculoskeletal:  moves extm spontaneously, no lower extremity edema


Neurologic:  grossly normal


Psychiatric:  mood/affect normal


Skin:  warm/dry, normal turgor





Medical Decision Making


Diagnostic Impression:  


 Primary Impression:  


 Chest pain


ER Course


71-year-old male with history of hypertension, liver cirrhosis, hep C, spinal 

stenosis, BPH, hepatic encephalopathy, chest pain, shortness of breath, left 

rotator cuff tear, arthropathy, GERD who presents with left-sided sharp chest 

pain 15 minutes prior to arrival.  Patient took 1 sublingual nitroglycerin at 

home, which did not help symptoms.  EMS gave 325 aspirin and 3 sprays of 

nitroglycerin with no improvement.  Patient notes associated shortness of 

breath.  He reports pain as 7 out of 10 at this tim71-year-old





Exam within normal limits.





ED plan: Evaluate for ACS, MI, nonspecific chest pain, 


Will perform chest x-ray lab testing and EKG





Patient found to have elevated CK MB.  Negative troponin.  Concerning for ACS.  

Patient's will need to be admitted to telemetry.





Laboratory Tests








Test


  12/24/19


19:10


 


White Blood Count


  8.7 K/UL


(4.8-10.8)


 


Red Blood Count


  3.18 M/UL


(4.70-6.10)  L


 


Hemoglobin


  11.4 G/DL


(14.2-18.0)  L


 


Hematocrit


  31.7 %


(42.0-52.0)  L


 


Mean Corpuscular Volume


  100 FL (80-99)


H


 


Mean Corpuscular Hemoglobin


  35.9 PG


(27.0-31.0)  H


 


Mean Corpuscular Hemoglobin


Concent 36.0 G/DL


(32.0-36.0)


 


Red Cell Distribution Width


  13.4 %


(11.6-14.8)


 


Platelet Count


  146 K/UL


(150-450)  L


 


Mean Platelet Volume


  5.4 FL


(6.5-10.1)  L


 


Neutrophils (%) (Auto)


  51.4 %


(45.0-75.0)


 


Lymphocytes (%) (Auto)


  25.9 %


(20.0-45.0)


 


Monocytes (%) (Auto)


  14.7 %


(1.0-10.0)  H


 


Eosinophils (%) (Auto)


  7.0 %


(0.0-3.0)  H


 


Basophils (%) (Auto)


  0.9 %


(0.0-2.0)


 


D-Dimer


  1.26 mg/L FEU


(0.00-0.49)  H


 


Sodium Level


  143 MMOL/L


(136-145)


 


Potassium Level


  3.6 MMOL/L


(3.5-5.1)


 


Chloride Level


  109 MMOL/L


()  H


 


Carbon Dioxide Level


  25 MMOL/L


(21-32)


 


Anion Gap


  9 mmol/L


(5-15)


 


Blood Urea Nitrogen


  32 mg/dL


(7-18)  H


 


Creatinine


  1.2 MG/DL


(0.55-1.30)


 


Estimate Glomerular


Filtration Rate  mL/min (>60)  


 


 


Glucose Level


  95 MG/DL


()


 


Calcium Level


  8.8 MG/DL


(8.5-10.1)


 


Total Bilirubin


  0.8 MG/DL


(0.2-1.0)


 


Aspartate Amino Transferase


(AST) 73 U/L (15-37)


H


 


Alanine Aminotransferase (ALT)


  84 U/L (12-78)


H


 


Alkaline Phosphatase


  98 U/L


()


 


Creatine Kinase MB


  5.1 NG/ML


(0.0-3.6)  H


 


Troponin I


  0.000 ng/mL


(0.000-0.056)


 


Pro-B-Type Natriuretic Peptide


  80 pg/mL


(0-125)


 


Total Protein


  7.4 G/DL


(6.4-8.2)


 


Albumin


  3.1 G/DL


(3.4-5.0)  L


 


Globulin 4.3 g/dL  


 


Albumin/Globulin Ratio


  0.7 (1.0-2.7)


L








Lab Results Impression


CBC noted mild anemia hemoglobin 11.4, mild low platelets, BUN slightly 

elevated 32, slight elevation in AST ALT, elevation of CK-MB 5.1.  Troponin 0


EKG Diagnostic Results


EP Interpretation:  Normal sinus rhythm with right bundle branch block left 

fascicular block, T


Rate:  normal


Rhythm:  NSR


ST Segments:  other





Chest X-Ray Diagnostic Results


Chest X-Ray Diagnostic Results :  


   Chest X-Ray Ordered:  Yes


   # of Views/Limited/Complete:  1 View


   Indication:  Chest Pain


   EP Interpretation:  Yes


   Interpretation:  no consolidation, no effusion, no pneumothorax, no acute 

cardiopulmonary disease


   Impression:  No acute disease


Reevaluation Time:  21:15





Last Vital Signs








  Date Time  Temp Pulse Resp B/P (MAP) Pulse Ox O2 Delivery O2 Flow Rate FiO2


 


12/24/19 18:36 99.0 77 18 136/86 (103) 100 Room Air  








Reevaluation Impression


Patient's care discussed with Dr. Thomas.  Patient admitted to telemetry.


Disposition:  ADMITTED AS INPATIENT











David Alanis M.D. Dec 24, 2019 19:36

## 2019-12-24 NOTE — NUR
ED Nurse Note:

Patient came into ER via RA 26 from [facility] with a c/o chest pain. Pt 
is aaox4, Upper sor[place] speaking and on room air. PAtient had 3 sprays of nitro and 
c/o pain 8/10 afterwards.

## 2019-12-24 NOTE — NUR
ED Nurse Note:



Report received from SVETLANA Esparza. Pt is aaox4, no respiratory distress noted. 
Pt c/o chest pain at this time. ERMD at bedside. IV line established and blood 
drawn and sent to lab. Pt placed on cardiac monitor and into hospital gown. 
Will continue to monitor.

## 2019-12-24 NOTE — NUR
ED Nurse Note:



Pt able to ambulate with steady gait to restroom, urine specimen collected and 
sent to lab.

## 2019-12-24 NOTE — NUR
ED Nurse Note:



Pt taken to tele floor via gurney with RN and tech. Pt is stable for transport, 
vss as charted. Pt understands reason for admission. Pt is aaox4, breathing is 
normal and unlabored. No acute distress noted. IV is patent and intact.

## 2019-12-25 NOTE — HISTORY AND PHYSICAL REPORT
DATE OF ADMISSION:  12/24/2019

DATE AND TIME SEEN:  12/25/2019 at 11 a.m.



ATTENDING PHYSICIAN:  Dr. Dubon for Dr. Payan.



CHIEF COMPLAINT:  Chest pain, T-wave inversion, left fascicular block.



BRIEF HISTORY:  This 71-year-old male, who lives in [facility]

under care of Dr. Payan presents to [facility] with the above-mentioned

diagnoses.  Currently, calm, sleeping in bed, refusing to answer

questions.



REVIEW OF SYSTEMS:  Unavailable.



PAST MEDICAL HISTORY:  Hypertension.



PAST SURGICAL HISTORY:  Unknown.



ALLERGIES:  Denies.



SOCIAL HISTORY:  Unable to obtain secondary to the patient's condition.



PHYSICAL EXAMINATION:

GENERAL:  Sleeping in bed, not responding to questions.

VITAL SIGNS:  Temperature 97, pulse 80, respirations 16, blood pressure

143/79.

CARDIOVASCULAR:  No murmurs.

LUNGS:  Poor exchange.

ABDOMEN:  Bowel sounds distant.

EXTREMITIES:  Show no cyanosis or edema.

NEUROLOGIC:  The patient is flaccid in bed now, does not follow

directions.



LABORATORY DATA:  Hemoglobin and hematocrit 11/31, platelets 146.  BMP

shows chloride 109, BUN 32.  Troponin 0.00.  Albumin 3.1.  INR is 1.0.

D-dimer 1.36.



MEDICATIONS:  Include lactulose, hydralazine, and Tylenol.



ASSESSMENT:

1. Chest pain.

2. Left fascicular block.

3. T-wave inversion.

4. Hypertension.

5. Malnutrition.

6. Anemia.



PLAN:

1. O2/pulmonary treatment as needed.

2. Blood pressure, pain control.

3. Dietary followup.

4. Resume home medications.

5. Cardiology followup.

6. PT, dietary evaluation.









  ______________________________________________

  Rito Dubon D.O. DR:  KOMAL

D:  12/25/2019 12:04

T:  12/25/2019 15:11

JOB#:  [id number]

CC:

## 2019-12-25 NOTE — NUR
NURSE NOTES:

Received pt and report from SVETLANA Aguilar. Observed pt ambulating around room. Cardiac monitor 
is in placed, IV site intact, asymptomatic, and patent. Bed is in the lowest position and 
locked. Call light and bedside table is within reach. No signs/symptoms of acute distress 
noted at this time. Will continue plan of care.

## 2019-12-25 NOTE — GENERAL PROGRESS NOTE
Assessment/Plan


Assessment/Plan:


Assessment


- Hepatitis C - per chart records


- Cirrhosis


- Biliary stent noted - placed 2 weeks ago at [facility] per brother, indications 

unknown


- anemia


- portal HTN


- GERD


- spinal stenosis


- hepatic encephalopathy 


- BPH


- CP





Recommendations


- patient poor historian and unable to get much info from brother


- check hepatitis serologies


- check tumor markers


- get outside records


- po as tolerated


- check NH3


- lactulose


- check INR


- MRCP in am





Subjective


Allergies:  


Coded Allergies:  


     No Known Allergies (Unverified , 8/19/18)





Objective





Last 24 Hour Vital Signs








  Date Time  Temp Pulse Resp B/P (MAP) Pulse Ox O2 Delivery O2 Flow Rate FiO2


 


12/25/19 08:00 96.6 80 16 143/79 (100) 97   


 


12/25/19 04:00  56      


 


12/25/19 04:00 96.6 65 16 151/57 (88) 97   


 


12/25/19 00:00 97.9 68 16 116/65 (82) 98   


 


12/25/19 00:00  70      


 


12/24/19 23:56      Room Air  


 


12/24/19 23:30 97.5 67 18 137/72 (93) 100   


 


12/24/19 23:00 99.0 72 12 118/60 100 Room Air  


 


12/24/19 19:10  64 18   Room Air  


 


12/24/19 19:10 99.0 64 18 136/86 100 Room Air  


 


12/24/19 18:36 99.0 77 18 136/86 (103) 100 Room Air  

















Intake and Output  


 


 12/24/19 12/25/19





 19:00 07:00


 


Intake Total  120 ml


 


Output Total  200 ml


 


Balance  -80 ml


 


  


 


Intake Oral  120 ml


 


Output Urine Total  200 ml


 


# Voids  4








Laboratory Tests


12/24/19 19:10: 


White Blood Count 8.7, Red Blood Count 3.18L, Hemoglobin 11.4L, Hematocrit 31.7L

, Mean Corpuscular Volume 100H, Mean Corpuscular Hemoglobin 35.9H, Mean 

Corpuscular Hemoglobin Concent 36.0, Red Cell Distribution Width 13.4, Platelet 

Count 146L, Mean Platelet Volume 5.4L, Neutrophils (%) (Auto) 51.4, Lymphocytes 

(%) (Auto) 25.9, Monocytes (%) (Auto) 14.7H, Eosinophils (%) (Auto) 7.0H, 

Basophils (%) (Auto) 0.9, D-Dimer 1.26H, Sodium Level 143, Potassium Level 3.6, 

Chloride Level 109H, Carbon Dioxide Level 25, Anion Gap 9, Blood Urea Nitrogen 

32H, Creatinine 1.2, Estimat Glomerular Filtration Rate , Glucose Level 95, 

Calcium Level 8.8, Total Bilirubin 0.8, Aspartate Amino Transf (AST/SGOT) 73H, 

Alanine Aminotransferase (ALT/SGPT) 84H, Alkaline Phosphatase 98, Creatine 

Kinase MB 5.1H, Troponin I 0.000, Pro-B-Type Natriuretic Peptide 80, Total 

Protein 7.4, Albumin 3.1L, Globulin 4.3, Albumin/Globulin Ratio 0.7L


12/25/19 05:50: Troponin I 0.000


Height (Feet):  5


Height (Inches):  7.00


Weight (Pounds):  126











Lee Martinez MD Dec 25, 2019 09:04

## 2019-12-26 NOTE — PROGRESS NOTE
DATE:  12/26/2019

SUBJECTIVE:  The patient was in bed, asleep, arousable.  Able to answer

questions.  He complained weakness, depressed mood, anhedonia,

worthlessness, hopelessness.



MENTAL STATUS EXAMINATION:  Alert, oriented times self, place, situation,

did not know the date.  Mood is depressed.  Affect is constricted,

congruent with mood.  Thought process, linear and goal oriented.  Thought

content, no suicidal or homicidal ideation.



ASSESSMENT:  Major depressive disorder.



PLAN:

1. The patient received Depakote 1000, which will be discontinued.

Start the patient on Lexapro 10 mg in the morning.

2. Provide the patient with reality orientation and supportive

therapy.









  ______________________________________________

  Deana Duran M.D.





DR:  JAJA

D:  12/26/2019 22:59

T:  12/26/2019 23:09

JOB#:  [id number]

CC:

## 2019-12-26 NOTE — NUR
HAND-OFF: 

Report given to SVETLANA Escalante. The patient is resting on the bed without acute distress or 
shortness of breath. The patient's bed in the lowest position, call light in reach, and fall 
and aspiration precaution reinforced. IV site intact and patent. Patient education given 
regarding Guerda scan on 12/27 and asked the patient to be NPO without chocolate and caffeine. 
Endorsed plan of care.

## 2019-12-26 NOTE — CONSULTATION
DATE OF CONSULTATION:  12/26/2019

CARDIOLOGY CONSULTATION



CONSULTING PHYSICIAN:  Levi Otero M.D.



REFERRING PHYSICIAN:  Martin Payan M.D.



REASON FOR CONSULTATION:  Right bundle-branch block and left posterior

fascicular block and chest pain.



HISTORY OF PRESENT ILLNESS:  The patient is a 71-year-old  gentleman,

was a very poor historian with history of hypertension, cirrhosis,

hepatitis C, portal hypertension, spinal stenosis, benign prostatic

hypertrophy, history of hepatic encephalopathy, gastroesophageal reflux

disease, was admitted to the hospital for chest pain.  The patient had CT

of the chest showed cirrhotic changes of the liver and portal hypertension

as well as biliary stent.  At the time of my evaluation, the patient

denies any chest pain or palpitation or shortness of breath and wants to

go home.  The patient was recently at [facility] two weeks

ago then apparently a stent was placed, but does not have much more

information.  His 12-lead EKG showed right bundle-branch block and sinus

bradycardia as well as history of fascicular block.



REVIEW OF SYSTEMS:  Review of systems was negative other than what was

mentioned in the history of present illness.



PAST MEDICAL HISTORY:  As mentioned above.



FAMILY HISTORY:  Noncontributory.



SOCIAL HISTORY:  Does not smoke or drink and has history of drinking in the

past, but does not drink alcohol per brother.



PHYSICAL EXAMINATION:

VITAL SIGNS:  Show blood pressure of 138/73, pulse is 91, respirations 18,

and temperature 97.2.

HEAD AND NECK:  Showed no JVD.

LUNGS:  Clear.

CARDIOVASCULAR:  Shows regular S1 and S2 with no gallop or murmur.

 

ABDOMEN:  Soft and nontender.

EXTREMITIES:  No pitting edema.



LABORATORY AND DIAGNOSTIC DATA:  His EKG shows sinus bradycardia, rate of

59, right bundle-branch block and left posterior fascicular block.  His

labs show white count of 10.5, hematocrit 11.2, hematocrit of 31.8, and

platelet count 134.  Sodium 142, potassium 3.9, BUN of 25, creatinine 0.9.

Troponin negative x2.



ASSESSMENT AND PLAN:

1. Atypical chest pain.  The patient does not currently have chest pain.

He has already ruled out for myocardial infarction.  His EKG shows right

bundle-branch block and left posterior fascicular block.  We will schedule

the patient for echocardiogram and nuclear stress test for further

evaluation.

2. Bifascicular block with right bundle-branch block and left posterior

fascicular block.  Again, a stress test will be ordered as well as

echocardiogram for further evaluation.  The patient denies any history of

syncope.

3. Hypertension on p.r.n. hydralazine.

4. Hepatitis C, cirrhosis, and history of encephalopathy and history of

biliary stent.  Currently on lactulose.  Further evaluation by Dr. Martinez.



Thank you very much for allowing me to participate in the care of this

patient.  Please do not hesitate to contact me for any questions regarding

my evaluation.









  ______________________________________________

  Levi Otero M.D. DR:  REMIGIO

D:  12/26/2019 09:43

T:  12/27/2019 02:22

JOB#:  [id number]

CC:

## 2019-12-26 NOTE — GENERAL PROGRESS NOTE
Assessment/Plan


Problem List:  


(1) HTN (hypertension)


ICD Codes:  I10 - Essential (primary) hypertension


SNOMED:  79826664


(2) Chest pain


ICD Codes:  R07.9 - Chest pain, unspecified


SNOMED:  66101492


Status:  progressing


Assessment/Plan:


no cp


r/o acs per cardiology


abnormal lab


check trop


weak





Subjective


ROS Limited/Unobtainable:  Yes


Allergies:  


Coded Allergies:  


     No Known Allergies (Unverified , 8/19/18)





Objective





Last 24 Hour Vital Signs








  Date Time  Temp Pulse Resp B/P (MAP) Pulse Ox O2 Delivery O2 Flow Rate FiO2


 


12/26/19 08:00  87      


 


12/26/19 08:00 97.2 91 18 138/73 (94) 99   


 


12/26/19 04:00 98.1 71 17 144/69 (94) 94   


 


12/26/19 04:00  66      


 


12/26/19 00:00  84      


 


12/26/19 00:00 97.7 79 16 153/90 (111) 98   


 


12/25/19 21:00      Room Air  


 


12/25/19 20:00  93      


 


12/25/19 20:00 97.7 68 17 144/98 (113) 96   


 


12/25/19 16:00 96.5 63 16 138/81 (100) 97   


 


12/25/19 15:53  83      


 


12/25/19 14:29 96.6       

















Intake and Output  


 


 12/25/19 12/26/19





 19:00 07:00


 


Intake Total 900 ml 460 ml


 


Balance 900 ml 460 ml


 


  


 


Intake Oral 900 ml 460 ml


 


# Voids 3 2








Laboratory Tests


12/26/19 06:02: 


White Blood Count 10.4, Red Blood Count 3.11L, Hemoglobin 11.2L, Hematocrit 

31.8L, Mean Corpuscular Volume 102H, Mean Corpuscular Hemoglobin 36.0H, Mean 

Corpuscular Hemoglobin Concent 35.2, Red Cell Distribution Width 14.3, Platelet 

Count 134L, Mean Platelet Volume 5.5L, Neutrophils (%) (Auto) 66.3, Lymphocytes 

(%) (Auto) 14.9L, Monocytes (%) (Auto) 11.8H, Eosinophils (%) (Auto) 6.4H, 

Basophils (%) (Auto) 0.7, Sodium Level 143, Potassium Level 3.9, Chloride Level 

113H, Carbon Dioxide Level 23, Anion Gap 7, Blood Urea Nitrogen 25H, Creatinine 

0.9, Estimat Glomerular Filtration Rate , Glucose Level 110H, Calcium Level 8.5


Height (Feet):  5


Height (Inches):  7.00


Weight (Pounds):  126


General Appearance:  confused


Cardiovascular:  normal rate


Respiratory/Chest:  lungs clear


Abdomen:  soft











Martin Payan MD Dec 26, 2019 13:10

## 2019-12-26 NOTE — NUR
PT EVALUATION NOTE

Patient seen for initial evaluation. Patient presents with generalized weakness and impaired 
balance which affects patient's ability to perform mobility tasks safely. Patient requires 
SBA for bed mobility, transfers and ambulation. Patient able to ambulate 150 ft with FWW and 
SBA. Patient slightly unsteady during ambulation however no loss of balance. Patient 
ambulates with decreased bilateral step length and decreased bilateral foot clearance, tends 
to be slightly impulsive at times. Patient will benefit from skilled inpatient PT 
intervention to address strength, balance and safety for improved level of functional 
mobility. Recommend discharge to SNF once medically cleared by MD.  

-------------------------------------------------------------------------------

Addendum: 12/26/19 at 1143 by ELENA GARCIA PT

-------------------------------------------------------------------------------

Amended: Links added.

## 2019-12-26 NOTE — NUR
NURSE NOTES:

Per cardiologist department, stress test will be done on 12/27/2019. Notified to Dr. Otero. 
The patient is resting on the bed without acute distress or shortness of breath. Will 
continue plan of care.

## 2019-12-26 NOTE — NUR
NURSE NOTES:

Received report from SVETLANA Wilkes. The patient is resting on the bed without acute distress or 
shortness of breath. The patient's bed in the lowest position, call light in reach, and 
strict fall and aspiration precaution reinforced. IV site intact and patent. Will continue 
plan of care.

## 2019-12-26 NOTE — CARDIAC ELECTROPHYSIOLOGY PN
Subjective


Subjective


Ruled out for MI. CTA no PE.RBBB, LPFB


Schedule Echo and stress test


[id number]





Objective





Last 24 Hour Vital Signs








  Date Time  Temp Pulse Resp B/P (MAP) Pulse Ox O2 Delivery O2 Flow Rate FiO2


 


12/26/19 08:00 97.2 91 18 138/73 (94) 99   


 


12/26/19 04:00 98.1 71 17 144/69 (94) 94   


 


12/26/19 04:00  66      


 


12/26/19 00:00  84      


 


12/26/19 00:00 97.7 79 16 153/90 (111) 98   


 


12/25/19 21:00      Room Air  


 


12/25/19 20:00  93      


 


12/25/19 20:00 97.7 68 17 144/98 (113) 96   


 


12/25/19 16:00 96.5 63 16 138/81 (100) 97   


 


12/25/19 15:53  83      


 


12/25/19 14:29 96.6       


 


12/25/19 12:00 96.6 63 16 140/80 (100) 97   


 


12/25/19 11:06  68      

















Intake and Output  


 


 12/25/19 12/26/19





 18:59 06:59


 


Intake Total 900 ml 460 ml


 


Balance 900 ml 460 ml


 


  


 


Intake Oral 900 ml 460 ml


 


# Voids 3 2











Laboratory Tests








Test


  12/26/19


06:02


 


White Blood Count


  10.4 K/UL


(4.8-10.8)


 


Red Blood Count


  3.11 M/UL


(4.70-6.10)  L


 


Hemoglobin


  11.2 G/DL


(14.2-18.0)  L


 


Hematocrit


  31.8 %


(42.0-52.0)  L


 


Mean Corpuscular Volume


  102 FL (80-99)


H


 


Mean Corpuscular Hemoglobin


  36.0 PG


(27.0-31.0)  H


 


Mean Corpuscular Hemoglobin


Concent 35.2 G/DL


(32.0-36.0)


 


Red Cell Distribution Width


  14.3 %


(11.6-14.8)


 


Platelet Count


  134 K/UL


(150-450)  L


 


Mean Platelet Volume


  5.5 FL


(6.5-10.1)  L


 


Neutrophils (%) (Auto)


  66.3 %


(45.0-75.0)


 


Lymphocytes (%) (Auto)


  14.9 %


(20.0-45.0)  L


 


Monocytes (%) (Auto)


  11.8 %


(1.0-10.0)  H


 


Eosinophils (%) (Auto)


  6.4 %


(0.0-3.0)  H


 


Basophils (%) (Auto)


  0.7 %


(0.0-2.0)


 


Sodium Level


  143 MMOL/L


(136-145)


 


Potassium Level


  3.9 MMOL/L


(3.5-5.1)


 


Chloride Level


  113 MMOL/L


()  H


 


Carbon Dioxide Level


  23 MMOL/L


(21-32)


 


Anion Gap


  7 mmol/L


(5-15)


 


Blood Urea Nitrogen


  25 mg/dL


(7-18)  H


 


Creatinine


  0.9 MG/DL


(0.55-1.30)


 


Estimat Glomerular Filtration


Rate  mL/min (>60)  


 


 


Glucose Level


  110 MG/DL


()  H


 


Calcium Level


  8.5 MG/DL


(8.5-10.1)

















Levi Otero MD Dec 26, 2019 09:42

## 2019-12-26 NOTE — CONSULTATION
DATE OF CONSULTATION:  12/25/2019

CONSULTING PHYSICIAN:  Lee Martinez M.D.



REFERRING PHYSICIAN:  Martin Payan M.D.



CHIEF COMPLAINT:  I was asked to see the patient by Dr. Martin Payan for

evaluation of liver problems.



HISTORY OF PRESENT ILLNESS:  The patient is a 71-year-old  man who is

a poor historian, who was brought into the hospital due to chest pain.

The patient does not give much details about his health, but a CT scan of

the chest shows cirrhotic changes of the liver, portal hypertension as

well as biliary stent.  The patient cannot give much information as to why

the biliary stent was placed.  Discussion was held with the patient's

brother by the name Jabari Reese, who could only state that the patient was

recently at [facility] about two weeks ago and he believes

that is where the stent was placed; however, he did not have much more

information of the patient's overall health.  There is a chart notation of

hepatitis C and cirrhosis, but the bases of these diagnostic impressions

are not clear.  The patient's medication list also includes lactulose for

presumed hepatic encephalopathy.  It is unclear if the patient ever had

endoscopy or colonoscopy, but biliary stent would certainly indicate he

had an ERCP examination.



PAST MEDICAL HISTORY:  History of hypertension, cirrhosis,  portal

hypertension, hepatitis C, spinal stenosis, prostatic hypertrophy, hepatic

encephalopathy, rotator cuff tear arthropathy, and gastroesophageal reflux

disease.



FAMILY HISTORY:  Unavailable.



SOCIAL HISTORY:  The patient does not drink alcohol per brother's

history.



REVIEW OF SYSTEMS:  Otherwise not obtainable.



PHYSICAL EXAMINATION:

GENERAL:  Debilitated, somewhat confused  man, seen in his

room.

HEENT:  Normocephalic and atraumatic.  Sclerae anicteric.

NECK:  Supple.

CHEST:  Clear to auscultation.

CARDIOVASCULAR:  Revealed regular rate.

ABDOMEN:  Soft, nontender.

EXTREMITIES:  No edema.



LABORATORY AND DIAGNOSTIC DATA:  Laboratory data noted.  CT scan was

reviewed.



ASSESSMENT:  This patient presents with abnormality in his liver tests as

well as CT imaging, which is suggestive of cirrhosis.  He has a biliary

stent, which was typically placed for obstructive process, but the details

are not clear.  I will ask the nursing staff to obtain old records from

the outside hospital.  In the meantime, I will check hepatitis serologies

as well as tumor markers.  He should be placed on lactulose and I will

follow his ammonia level.  Diagnostic impression will evolve as more

information is available or from various recommendations.



Thank you for asking me to participate in the care this patient care of

this patient.









  ______________________________________________

  Lee Martinez M.D.





DR:  Dom

D:  12/25/2019 10:11

T:  12/26/2019 00:27

JOB#:  [id number]

CC:



EMILIANO

## 2019-12-26 NOTE — NUR
CARDIOLOGY

Dr. Solis plans to come for Lexiscan stress test tomorrow (Dec 27) at 9am. Need NPO starting 
midnight and No Caffeine.

## 2019-12-26 NOTE — NUR
NURSE NOTES:

Notified Dr. Otero regarding the patient's condition. The patient is free from chest pain. 
Dr. Otero ordered 2D ECHO, stress test, and medication for hypertension and 
hypercholesterolemia. Will carry out the order as soon as possible. Will continue plan of 
care.

## 2019-12-26 NOTE — GENERAL PROGRESS NOTE
Assessment/Plan


Assessment/Plan:


cirrhosis


biliary stent


thrombocytopenia


gastric varices





fu hepatitis panel


add xifaxan


dc norvasc


add propanolol


repeat labs





Subjective


ROS Limited/Unobtainable:  Yes


Allergies:  


Coded Allergies:  


     No Known Allergies (Unverified , 8/19/18)





Objective





Last 24 Hour Vital Signs








  Date Time  Temp Pulse Resp B/P (MAP) Pulse Ox O2 Delivery O2 Flow Rate FiO2


 


12/26/19 08:00  87      


 


12/26/19 08:00 97.2 91 18 138/73 (94) 99   


 


12/26/19 04:00 98.1 71 17 144/69 (94) 94   


 


12/26/19 04:00  66      


 


12/26/19 00:00  84      


 


12/26/19 00:00 97.7 79 16 153/90 (111) 98   


 


12/25/19 21:00      Room Air  


 


12/25/19 20:00  93      


 


12/25/19 20:00 97.7 68 17 144/98 (113) 96   


 


12/25/19 16:00 96.5 63 16 138/81 (100) 97   


 


12/25/19 15:53  83      


 


12/25/19 14:29 96.6       

















Intake and Output  


 


 12/25/19 12/26/19





 19:00 07:00


 


Intake Total 900 ml 460 ml


 


Balance 900 ml 460 ml


 


  


 


Intake Oral 900 ml 460 ml


 


# Voids 3 2








Laboratory Tests


12/26/19 06:02: 


White Blood Count 10.4, Red Blood Count 3.11L, Hemoglobin 11.2L, Hematocrit 

31.8L, Mean Corpuscular Volume 102H, Mean Corpuscular Hemoglobin 36.0H, Mean 

Corpuscular Hemoglobin Concent 35.2, Red Cell Distribution Width 14.3, Platelet 

Count 134L, Mean Platelet Volume 5.5L, Neutrophils (%) (Auto) 66.3, Lymphocytes 

(%) (Auto) 14.9L, Monocytes (%) (Auto) 11.8H, Eosinophils (%) (Auto) 6.4H, 

Basophils (%) (Auto) 0.7, Sodium Level 143, Potassium Level 3.9, Chloride Level 

113H, Carbon Dioxide Level 23, Anion Gap 7, Blood Urea Nitrogen 25H, Creatinine 

0.9, Estimat Glomerular Filtration Rate , Glucose Level 110H, Calcium Level 8.5


Height (Feet):  5


Height (Inches):  7.00


Weight (Pounds):  126


General Appearance:  alert


EENT:  normal ENT inspection


Neck:  supple


Cardiovascular:  normal rate


Respiratory/Chest:  decreased breath sounds


Abdomen:  normal bowel sounds, non tender, soft


Extremities:  non-tender











Neal De La Cruz MD Dec 26, 2019 12:21

## 2019-12-26 NOTE — NUR
NURSE NOTES:

Received report from SVETLANA Mahoney. Patient is in bed, awake and responsive. Breathing regular 
and unlabored with no s/s of SOB noted at this time. Patient is able to make needs known. No 
C/O pain or discomfort noted at this time. Bed is in lowest position, breaks engaged, and 
call light is within reach at all times. Will continue to monitor.

## 2019-12-26 NOTE — NUR
NURSE NOTES:

Dr. De La Cruz at the bedside assessed the patient. Dr. De La Cruz ordered medication and lab for 
12/27 AM. The patient is stable without acute distress or shortness of breath. Will continue 
plan of care.

## 2019-12-27 NOTE — NUR
NURSE NOTES:

CNA passed the tray accidently and noticed that the patient ate breakfast with coffee even 
with the patient education and sign saying that NPO since midnight no caffeine and no 
chocolate. Notified to cardiology department. Notified to Dr. Otero and Dr. Payan. Per Dr. Payan, discharge the patient and follow up the stress test as outpatient if cleared by 
cardiologist. Dr. Otero will come and assess the patient prior to discharge. Will continue 
plan of care until clarification made.

## 2019-12-27 NOTE — NUR
NURSE NOTES:

Patient is aware of the scheduled cardiac stress test for 12/27/19. Placed sign by patient's 
bed for NPO with no caffeine, or tea status after midnight. Patient is aware and agrees with 
the plan of care. Patient remains stable, will continue to monitor.

## 2019-12-27 NOTE — NUR
DISCHARGE PLANNED: 



PATIENT IS RETURNING TO 

[facility]

T: [phone] FOR NURSE TO NURSE REPORT 



ROOM# 12[address]

senior living 



[facility] PICKUP TIME 1230

SPOKE WITH BROTHER (BRENT) MADE HIM AWARE OF DISCHARGE

## 2019-12-27 NOTE — NUR
NURSE NOTES:

The patient's heart rate was fluctuating from 50s to 60s with episode of vomiting, 
propranolol was hold. Will continue to monitor the patient closely.

## 2019-12-27 NOTE — DIAGNOSTIC IMAGING REPORT
Indication:  chest pain

 

Technique: The study was conducted under the supervision of a cardiologist.

Dolbutamine administration followed by intravenous administration of 21.4 mCi of

technetium 99m Myoview was performed. Three plane SPECT imaging of the heart was then

performed. A resting study was performed as part of the one-day protocol with 11.2

mCi of technetium 99m myoview injected intravenously at that time. Three plane SPECT

imaging of the heart was obtained.

 

Comparison: None

 

Clinical data:  Resting heart rate: 64. Peak heart rate: 113. (85% maximum heart

rate: 127)

 

Resting BP: 142/76. Peak BP: 148/89

 

 Patient experienced vague chest pain at rest.

 

1. Clinical response:  Non ischemic

2. Electrocardiographic response: Nondiagnostic. Right bundle branch block

 

Findings: 

 

The myocardial perfusion scan demonstrates no definite fixed or reversible perfusion

defects. However the study is suboptimal with regard to level of stress obtained.

LVEF estimated at 77%

 

IMPRESSION:

 

Negative myocardial perfusion scan.

 

Note: Suboptimal stress with the peak heart rate short of the 85% maximum predicted

heart rate.

## 2019-12-27 NOTE — NUR
NURSE NOTES:

Notified Dr. Payan regarding the patient's mild degree of fever (100.4F) with elevated WBC. 
No new order yet. Will continue plan of care and carry out the order as soon as possible.

## 2019-12-27 NOTE — GENERAL PROGRESS NOTE
Assessment/Plan


Status:  progressing


Assessment/Plan:


cirrhosis


biliary stent


thrombocytopenia


gastric varices





fu hepatitis panel>>> hepatitis C positive


 xifaxan


off norvasc


on propanolol


repeat labs





Subjective


ROS Limited/Unobtainable:  No


Allergies:  


Coded Allergies:  


     No Known Allergies (Unverified , 8/19/18)





Objective





Last 24 Hour Vital Signs








  Date Time  Temp Pulse Resp B/P (MAP) Pulse Ox O2 Delivery O2 Flow Rate FiO2


 


12/27/19 08:54    127/65    


 


12/27/19 04:00 98.0 69 19 126/93 (104) 97   


 


12/27/19 04:00  59      


 


12/27/19 00:00 98.1 67 19 124/95 (105) 98   


 


12/27/19 00:00  65      


 


12/26/19 21:01  65  126/97    


 


12/26/19 21:00      Room Air  


 


12/26/19 20:00 99.1 65 20 126/82 (97) 98   


 


12/26/19 20:00  64      


 


12/26/19 17:21    146/82    


 


12/26/19 16:00  88      


 


12/26/19 16:00 97.5 80 18 146/82 (103) 99   


 


12/26/19 13:17  84  143/93    


 


12/26/19 12:00 97.9 84 18 143/93 (110) 99   


 


12/26/19 12:00  79      

















Intake and Output  


 


 12/26/19 12/27/19





 18:59 06:59


 


Intake Total 720 ml 


 


Balance 720 ml 


 


  


 


Intake Oral 720 ml 


 


# Voids 3 2


 


# Bowel Movements  1








Laboratory Tests


12/27/19 08:05: 


White Blood Count 14.4H, Red Blood Count 3.40L, Hemoglobin 12.2L, Hematocrit 

34.7L, Mean Corpuscular Volume 102H, Mean Corpuscular Hemoglobin 35.7H, Mean 

Corpuscular Hemoglobin Concent 35.0, Red Cell Distribution Width 13.8, Platelet 

Count 123L, Mean Platelet Volume 5.4L, Neutrophils (%) (Auto) 78.6H, 

Lymphocytes (%) (Auto) 9.9L, Monocytes (%) (Auto) 5.5, Eosinophils (%) (Auto) 

5.5H, Basophils (%) (Auto) 0.6, Sodium Level [Pending], Potassium Level [Pending

], Chloride Level [Pending], Carbon Dioxide Level [Pending], Blood Urea 

Nitrogen [Pending], Creatinine [Pending], Estimat Glomerular Filtration Rate [

Pending], Glucose Level [Pending], Calcium Level [Pending], Total Bilirubin [

Pending], Aspartate Amino Transf (AST/SGOT) [Pending], Alanine Aminotransferase 

(ALT/SGPT) [Pending], Alkaline Phosphatase [Pending], Ammonia [Pending], Total 

Protein [Pending], Albumin [Pending], Globulin [Pending]


Height (Feet):  5


Height (Inches):  7.00


Weight (Pounds):  126


General Appearance:  alert


EENT:  normal ENT inspection


Neck:  supple


Cardiovascular:  normal rate


Respiratory/Chest:  decreased breath sounds


Abdomen:  normal bowel sounds, non tender, soft


Extremities:  non-tender











Neal De La Cruz MD Dec 27, 2019 09:37

## 2019-12-27 NOTE — NUR
NURSE NOTES:

Received report from SVETLANA Escalante. The patient is resting on the bed without acute distress 
or shortness of breath. The patient's bed in the lowest position, call light in reach, and 
fall and aspiration precaution reinforced. IV site intact and patent. The patient is 
scheduled for Guerda scan today, and the patient education given regarding NPO since midnight 
and no caffeine and no chocolate. Will continue plan of care.

## 2019-12-27 NOTE — NUR
***DISCHARGE PLANNED: 



PATIENT IS RETURNING TO 

[facility]

T: [id number] FOR NURSE TO NURSE REPORT 



ROOM# 12B

[facility] 



[facility] TIME WILL CALL

SPOKE WITH BROTHER (BRENT) MADE HIM AWARE OF DISCHARGE



WAITING FOR DISCHARGE ORDER

## 2019-12-27 NOTE — NUR
NURSE NOTES:

Dr. Otero ordered potassium supplement for hypokalemia. Will carry out the order. Will 
continue plan of care.

## 2019-12-27 NOTE — NUR
PT NOTE

Attempted to see patient for PT treatment. Patient declining to participate stating "I'm 
tired". Will follow up tomorrow.

## 2019-12-27 NOTE — GENERAL PROGRESS NOTE
Assessment/Plan


Problem List:  


(1) HTN (hypertension)


ICD Codes:  I10 - Essential (primary) hypertension


SNOMED:  26744653


(2) Chest pain


ICD Codes:  R07.9 - Chest pain, unspecified


SNOMED:  60885161


Status:  progressing


Assessment/Plan:


no cp


r/o acs per cardiology


stress test was cancelled


dc and f/u as outpatient


dr tejada made aware of the cancellation of stress test





Subjective


ROS Limited/Unobtainable:  Yes


HEENT:  Reports: no symptoms


Allergies:  


Coded Allergies:  


     No Known Allergies (Unverified , 8/19/18)





Objective





Last 24 Hour Vital Signs








  Date Time  Temp Pulse Resp B/P (MAP) Pulse Ox O2 Delivery O2 Flow Rate FiO2


 


12/27/19 04:00 98.0 69 19 126/93 (104) 97   


 


12/27/19 04:00  59      


 


12/27/19 00:00 98.1 67 19 124/95 (105) 98   


 


12/27/19 00:00  65      


 


12/26/19 21:01  65  126/97    


 


12/26/19 21:00      Room Air  


 


12/26/19 20:00 99.1 65 20 126/82 (97) 98   


 


12/26/19 20:00  64      


 


12/26/19 17:21    146/82    


 


12/26/19 16:00  88      


 


12/26/19 16:00 97.5 80 18 146/82 (103) 99   


 


12/26/19 13:17  84  143/93    


 


12/26/19 12:00 97.9 84 18 143/93 (110) 99   


 


12/26/19 12:00  79      


 


12/26/19 09:00      Room Air  

















Intake and Output  


 


 12/26/19 12/27/19





 18:59 06:59


 


Intake Total 720 ml 


 


Balance 720 ml 


 


  


 


Intake Oral 720 ml 


 


# Voids 3 2


 


# Bowel Movements  1








Height (Feet):  5


Height (Inches):  7.00


Weight (Pounds):  126


Cardiovascular:  normal rate


Respiratory/Chest:  lungs clear











Martin Payan MD Dec 27, 2019 08:05

## 2019-12-27 NOTE — NUR
NURSE NOTES:

The patient safely completed Dobutamine stress test with supervising physician, cardiology 
department, charge nurse, and primary nurse. Will closely monitor the patient.

## 2019-12-27 NOTE — CARDIAC ELECTROPHYSIOLOGY PN
Assessment/Plan


Assessment/Plan


1. Atypical chest pain.  The patient does not currently have chest pain.


He has already ruled out for myocardial infarction.  His EKG shows right


bundle-branch block and left posterior fascicular block.  Awaiting Dobutamine 

cardiolite stress test today pending discharge


ECho EF 65%





2. Bifascicular block with right bundle-branch block and left posterior


fascicular block.  The patient denies any history of syncope.


3. Hypertension on p.r.n. hydralazine.


4. Hepatitis C, cirrhosis, and history of encephalopathy and history of


biliary stent.  Currently on lactulose.  Further evaluation by Dr. Martinez.





DW RN, Dr Thomas and Cardiology Jaswant





Subjective


Subjective


Ruled out for MI. No PE.RBBB, LPFB


Schedule Dobutamine stress test today 


Echo EF 65%





Objective





Last 24 Hour Vital Signs








  Date Time  Temp Pulse Resp B/P (MAP) Pulse Ox O2 Delivery O2 Flow Rate FiO2


 


12/27/19 08:54    127/65    


 


12/27/19 08:00 99.1 72 18 127/65 (85) 98   


 


12/27/19 08:00  77      


 


12/27/19 04:00 98.0 69 19 126/93 (104) 97   


 


12/27/19 04:00  59      


 


12/27/19 00:00 98.1 67 19 124/95 (105) 98   


 


12/27/19 00:00  65      


 


12/26/19 21:01  65  126/97    


 


12/26/19 21:00      Room Air  


 


12/26/19 20:00 99.1 65 20 126/82 (97) 98   


 


12/26/19 20:00  64      


 


12/26/19 17:21    146/82    


 


12/26/19 16:00  88      


 


12/26/19 16:00 97.5 80 18 146/82 (103) 99   


 


12/26/19 13:17  84  143/93    


 


12/26/19 12:00 97.9 84 18 143/93 (110) 99   


 


12/26/19 12:00  79      

















Intake and Output  


 


 12/26/19 12/27/19





 19:00 07:00


 


Intake Total 720 ml 


 


Balance 720 ml 


 


  


 


Intake Oral 720 ml 


 


# Voids 3 2


 


# Bowel Movements  1











Laboratory Tests








Test


  12/27/19


08:05


 


White Blood Count


  14.4 K/UL


(4.8-10.8)  H


 


Red Blood Count


  3.40 M/UL


(4.70-6.10)  L


 


Hemoglobin


  12.2 G/DL


(14.2-18.0)  L


 


Hematocrit


  34.7 %


(42.0-52.0)  L


 


Mean Corpuscular Volume


  102 FL (80-99)


H


 


Mean Corpuscular Hemoglobin


  35.7 PG


(27.0-31.0)  H


 


Mean Corpuscular Hemoglobin


Concent 35.0 G/DL


(32.0-36.0)


 


Red Cell Distribution Width


  13.8 %


(11.6-14.8)


 


Platelet Count


  123 K/UL


(150-450)  L


 


Mean Platelet Volume


  5.4 FL


(6.5-10.1)  L


 


Neutrophils (%) (Auto)


  78.6 %


(45.0-75.0)  H


 


Lymphocytes (%) (Auto)


  9.9 %


(20.0-45.0)  L


 


Monocytes (%) (Auto)


  5.5 %


(1.0-10.0)


 


Eosinophils (%) (Auto)


  5.5 %


(0.0-3.0)  H


 


Basophils (%) (Auto)


  0.6 %


(0.0-2.0)


 


Sodium Level


  139 MMOL/L


(136-145)


 


Potassium Level


  3.1 MMOL/L


(3.5-5.1)  L


 


Chloride Level


  107 MMOL/L


()


 


Carbon Dioxide Level


  21 MMOL/L


(21-32)


 


Anion Gap


  11 mmol/L


(5-15)


 


Blood Urea Nitrogen


  19 mg/dL


(7-18)  H


 


Creatinine


  0.9 MG/DL


(0.55-1.30)


 


Estimat Glomerular Filtration


Rate  mL/min (>60)  


 


 


Glucose Level


  179 MG/DL


()  H


 


Calcium Level


  8.2 MG/DL


(8.5-10.1)  L


 


Total Bilirubin


  1.3 MG/DL


(0.2-1.0)  H


 


Direct Bilirubin


  0.3 MG/DL


(0.0-0.3)


 


Aspartate Amino Transf


(AST/SGOT) 65 U/L (15-37)


H


 


Alanine Aminotransferase


(ALT/SGPT) 75 U/L (12-78)


 


 


Alkaline Phosphatase


  90 U/L


()


 


Ammonia


  66 umol/L


(11-32)  H


 


Total Protein


  7.0 G/DL


(6.4-8.2)


 


Albumin


  2.7 G/DL


(3.4-5.0)  L


 


Globulin 4.3 g/dL  


 


Albumin/Globulin Ratio


  0.6 (1.0-2.7)


L











Microbiology








 Date/Time


Source Procedure


Growth Status


 


 


 12/24/19 21:30


Nasal Nares MRSA Culture - Final


NO METHICILLIN RESISTANT STAPH AUREUS... Complete


 


 12/24/19 21:30


Rectum VRE Culture - Final


NO VANCOMYCIN RESISTANT ENTEROCOCCUS ... Complete


 


 12/24/19 21:30


Rectum - Final


NO CARBAPENEM-RESISTANT ENTEROBACTERI... Complete








Objective


HEAD AND NECK:  No JVD.


LUNGS:  Clear.


CARDIOVASCULAR:  Regular S1 and S2 with no gallop or murmur. 


ABDOMEN:  Soft and nontender.


EXTREMITIES:  No pitting edema.











Levi Otero MD Dec 27, 2019 11:23

## 2019-12-27 NOTE — NUR
NURSE NOTES:

Notified Dr. Payan regarding the patient's mild grade fever with elevated WBC. Dr. Payan 
consulted with ID doctor but not clear about the name. Will closely monitor the patient. 
Will continue plan of care. 

-------------------------------------------------------------------------------

Addendum: 12/27/19 at 1412 by Tomas Reese RN

-------------------------------------------------------------------------------

Dr. Payan also cancelled discharge order due to conditions above. Will continue plan of 
care.

## 2019-12-27 NOTE — NUR
NURSE NOTES: Received patient from Maru MOTA.  Patient in bed, on room air, no signs of 
respiratory distress.  Patient is alert and oriented x3.  Bed in low position, locked, bed 
alarm on, call light within reach.

## 2019-12-27 NOTE — CARDIAC ELECTROPHYSIOLOGY PN
Assessment/Plan


Assessment/Plan


1. Atypical chest pain.  The patient does not currently have chest pain.


He has already ruled out for myocardial infarction.  His EKG shows right


bundle-branch block and left posterior fascicular block.  Awaiting Dobutamine 

cardiolite stress test today pending discharge


ECho EF 65%. On Inderal





2. Bifascicular block with right bundle-branch block and left posterior


fascicular block.  The patient denies any history of syncope.


3. Hypertension on Inderal 10 q 8 qnd p.r.n. hydralazine.


4. Hepatitis C, cirrhosis, and history of encephalopathy and history of


biliary stent.  Currently on lactulose and Inderal.  Further evaluation by Dr. aMrtinez.





MARTY RN, Dr Thomas and Cardiology Jaswant





Subjective


Subjective


Ruled out for MI. No PE.RBBB, LPFB. 


Awaiting Dobutamine stress test today 


Echo EF 65%





Objective





Last 24 Hour Vital Signs








  Date Time  Temp Pulse Resp B/P (MAP) Pulse Ox O2 Delivery O2 Flow Rate FiO2


 


12/27/19 08:54    127/65    


 


12/27/19 08:00 99.1 72 18 127/65 (85) 98   


 


12/27/19 08:00  77      


 


12/27/19 04:00 98.0 69 19 126/93 (104) 97   


 


12/27/19 04:00  59      


 


12/27/19 00:00 98.1 67 19 124/95 (105) 98   


 


12/27/19 00:00  65      


 


12/26/19 21:01  65  126/97    


 


12/26/19 21:00      Room Air  


 


12/26/19 20:00 99.1 65 20 126/82 (97) 98   


 


12/26/19 20:00  64      


 


12/26/19 17:21    146/82    


 


12/26/19 16:00  88      


 


12/26/19 16:00 97.5 80 18 146/82 (103) 99   


 


12/26/19 13:17  84  143/93    


 


12/26/19 12:00 97.9 84 18 143/93 (110) 99   


 


12/26/19 12:00  79      

















Intake and Output  


 


 12/26/19 12/27/19





 19:00 07:00


 


Intake Total 720 ml 


 


Balance 720 ml 


 


  


 


Intake Oral 720 ml 


 


# Voids 3 2


 


# Bowel Movements  1











Laboratory Tests








Test


  12/27/19


08:05


 


White Blood Count


  14.4 K/UL


(4.8-10.8)  H


 


Red Blood Count


  3.40 M/UL


(4.70-6.10)  L


 


Hemoglobin


  12.2 G/DL


(14.2-18.0)  L


 


Hematocrit


  34.7 %


(42.0-52.0)  L


 


Mean Corpuscular Volume


  102 FL (80-99)


H


 


Mean Corpuscular Hemoglobin


  35.7 PG


(27.0-31.0)  H


 


Mean Corpuscular Hemoglobin


Concent 35.0 G/DL


(32.0-36.0)


 


Red Cell Distribution Width


  13.8 %


(11.6-14.8)


 


Platelet Count


  123 K/UL


(150-450)  L


 


Mean Platelet Volume


  5.4 FL


(6.5-10.1)  L


 


Neutrophils (%) (Auto)


  78.6 %


(45.0-75.0)  H


 


Lymphocytes (%) (Auto)


  9.9 %


(20.0-45.0)  L


 


Monocytes (%) (Auto)


  5.5 %


(1.0-10.0)


 


Eosinophils (%) (Auto)


  5.5 %


(0.0-3.0)  H


 


Basophils (%) (Auto)


  0.6 %


(0.0-2.0)


 


Sodium Level


  139 MMOL/L


(136-145)


 


Potassium Level


  3.1 MMOL/L


(3.5-5.1)  L


 


Chloride Level


  107 MMOL/L


()


 


Carbon Dioxide Level


  21 MMOL/L


(21-32)


 


Anion Gap


  11 mmol/L


(5-15)


 


Blood Urea Nitrogen


  19 mg/dL


(7-18)  H


 


Creatinine


  0.9 MG/DL


(0.55-1.30)


 


Estimat Glomerular Filtration


Rate  mL/min (>60)  


 


 


Glucose Level


  179 MG/DL


()  H


 


Calcium Level


  8.2 MG/DL


(8.5-10.1)  L


 


Total Bilirubin


  1.3 MG/DL


(0.2-1.0)  H


 


Direct Bilirubin


  0.3 MG/DL


(0.0-0.3)


 


Aspartate Amino Transf


(AST/SGOT) 65 U/L (15-37)


H


 


Alanine Aminotransferase


(ALT/SGPT) 75 U/L (12-78)


 


 


Alkaline Phosphatase


  90 U/L


()


 


Ammonia


  66 umol/L


(11-32)  H


 


Total Protein


  7.0 G/DL


(6.4-8.2)


 


Albumin


  2.7 G/DL


(3.4-5.0)  L


 


Globulin 4.3 g/dL  


 


Albumin/Globulin Ratio


  0.6 (1.0-2.7)


L











Microbiology








 Date/Time


Source Procedure


Growth Status


 


 


 12/24/19 21:30


Nasal Nares MRSA Culture - Final


NO METHICILLIN RESISTANT STAPH AUREUS... Complete


 


 12/24/19 21:30


Rectum VRE Culture - Final


NO VANCOMYCIN RESISTANT ENTEROCOCCUS ... Complete


 


 12/24/19 21:30


Rectum - Final


NO CARBAPENEM-RESISTANT ENTEROBACTERI... Complete








Objective


HEAD AND NECK:  No JVD.


LUNGS:  Clear.


CARDIOVASCULAR:  Regular S1 and S2 with no gallop or murmur. 


ABDOMEN:  Soft and nontender.


EXTREMITIES:  No pitting edema.











Levi Otero MD Dec 27, 2019 11:24

## 2019-12-27 NOTE — NUR
HAND-OFF: 

Report given to SVETLANA Patton. The patient is resting on the bed without acute distress or 
shortness of breath. The patient's bed in the lowest position, call light in reach, and fall 
and aspiration precaution reinforced. IV site intact and patent. Endorsed plan of care. No pertinent past medical history

## 2019-12-27 NOTE — NUR
NURSE NOTES:

Dr. Otero at the bedside assessed the patient. Dr. Otero ordered the patient to have 
Dobutamine stress test and approved by supervising physician (Dr. Martin), Dr. Otero, and 
Cardiology technician. Informed the patient. The patient denies of chest pain or shortness 
of breath. Will continue plan of care.

## 2019-12-27 NOTE — NUR
CASE MANAGEMENT: INITIAL REVIEW



71 YR OLD FEMALE BIBA FROM [facility] 



CC: CHEST PAIN 



SI: CHEST PAIN 

98.9   77   18   136/86   100% ON RA

H/H 11.4/31.7  BUN 32  AST/ALT 73/84  CK 5.1  DDIMER 1.26



IS: TYLENOL IN X1





\**: 2E TELE UNIT 



DCP: RETURN 



PLAN: 

[facility]

## 2019-12-27 NOTE — CONSULTATION
History of Present Illness


General


Chief Complaint:  Chest Pain





Present Illness


Allergies:  


Coded Allergies:  


     No Known Allergies (Unverified , 8/19/18)





Medication History


Scheduled


Acetaminophen* (Tylenol Extra Strength*), 1,000 MG ORAL Q4H, (Reported)


Amlodipine Besylate* (Amlodipine Besylate*), 5 MG ORAL DAILY, (Reported)


Aspirin* (Aspir 81*), 81 MG ORAL DAILY, (Reported)


Atorvastatin Calcium* (Lipitor*), 80 MG ORAL BEDTIME, (Reported)


Losartan Potassium* (Losartan Potassium*), 50 MG ORAL DAILY, (Reported)


Omeprazole (Omeprazole), 40 MG ORAL DAILY, (Reported)





Scheduled PRN


Acetaminophen* (Acetaminophen 325MG Tablet*), 650 MG ORAL Q4H PRN for Mild Pain/

Temp > 100.5, (Reported)


Bisacodyl (Dulcolax), 10 MG RC for Constipation, (Reported)


Clonidine Hcl* (Catapres*), 0.1 MG ORAL EVERY 4 HOURS PRN for BP > 160, (

Reported)


Docusate Sodium* (Docusate Sodium*), 250 MG ORAL DAILY PRN for Constipation, (

Reported)


Lactulose (Lactulose*), 30 ML ORAL BID PRN for Constipation, (Reported)


Mag Hydrox/Al Hydrox/Simeth (Maalox Maximum Strength Susp), 15 ML PO EVERY 8 

HOURS PRN for GI UPSET, (Reported)


Magnesium Hydroxide* (Milk Of Magnesia*), 30 ML ORAL DAILY PRN for Constipation,

 (Reported)


Na Phos,M-B/Na Phos,Di-Ba (Fleet Enema), 133 ML RC Q2D PRN for Constipation, (

Reported)


Nitroglycerin 0.4MG table* (Nitroglycerin*), 0.4 MG SL .Q5MIN X 3 DOSES PRN for 

CHEST PAIN, (Reported)





Discontinued Medications


Albuterol Sulfate* (Albuterol Sulfate Hhn*), 3 ML INH Q4H PRN for Shortness of 

Breath, (Reported)


   Discontinued Reason: Pt stopped taking med


Atorvastatin Calcium* (Atorvastatin Calcium*), 80 MG ORAL BEDTIME, (Reported)


   Discontinued Reason: Prescription changed


Baclofen* (Baclofen*), 10 MG ORAL THREE TIMES A DAY, (Reported)


   Discontinued Reason: Pt stopped taking med


Diphenhydramine Hcl* (Benadryl*), 25 MG ORAL Q6H PRN for Itching, (Reported)


   Discontinued Reason: Pt stopped taking med


Docusate Sodium* (Colace*), 250 MG ORAL DAILY, (Reported)


   Discontinued Reason: Pt stopped taking med


Hydralazine Hcl* (Hydralazine Hcl*), 50 MG ORAL EVERY 8 HOURS, (Reported)


   Discontinued Reason: Pt stopped taking med


Hydrocodone Bit/Acetaminophen 5-325* (Norco 5-325*), 1 TAB ORAL Q6H PRN for For 

Pain, (Reported)


   Discontinued Reason: Pt stopped taking med


Melatonin (Melatonin), 3 MG ORAL BEDTIME PRN for Insomnia, (Reported)


   Discontinued Reason: Pt stopped taking med


Omeprazole (Omeprazole), 20 MG ORAL DAILY, (Reported)


   Discontinued Reason: Pt stopped taking med





Patient History


Healthcare decision maker





Resuscitation status


Full Code


Advanced Directive on File








Physical Exam





Last 24 Hour Vital Signs








  Date Time  Temp Pulse Resp B/P (MAP) Pulse Ox O2 Delivery O2 Flow Rate FiO2


 


12/27/19 08:54    127/65    


 


12/27/19 08:00 99.1 72 18 127/65 (85) 98   


 


12/27/19 08:00  77      


 


12/27/19 04:00 98.0 69 19 126/93 (104) 97   


 


12/27/19 04:00  59      


 


12/27/19 00:00 98.1 67 19 124/95 (105) 98   


 


12/27/19 00:00  65      


 


12/26/19 21:01  65  126/97    


 


12/26/19 21:00      Room Air  


 


12/26/19 20:00 99.1 65 20 126/82 (97) 98   


 


12/26/19 20:00  64      


 


12/26/19 17:21    146/82    


 


12/26/19 16:00  88      


 


12/26/19 16:00 97.5 80 18 146/82 (103) 99   


 


12/26/19 13:17  84  143/93    


 


12/26/19 12:00 97.9 84 18 143/93 (110) 99   


 


12/26/19 12:00  79      

















Intake and Output  


 


 12/26/19 12/27/19





 19:00 07:00


 


Intake Total 720 ml 


 


Balance 720 ml 


 


  


 


Intake Oral 720 ml 


 


# Voids 3 2


 


# Bowel Movements  1











Laboratory Tests








Test


  12/27/19


08:05


 


White Blood Count


  14.4 K/UL


(4.8-10.8)  H


 


Red Blood Count


  3.40 M/UL


(4.70-6.10)  L


 


Hemoglobin


  12.2 G/DL


(14.2-18.0)  L


 


Hematocrit


  34.7 %


(42.0-52.0)  L


 


Mean Corpuscular Volume


  102 FL (80-99)


H


 


Mean Corpuscular Hemoglobin


  35.7 PG


(27.0-31.0)  H


 


Mean Corpuscular Hemoglobin


Concent 35.0 G/DL


(32.0-36.0)


 


Red Cell Distribution Width


  13.8 %


(11.6-14.8)


 


Platelet Count


  123 K/UL


(150-450)  L


 


Mean Platelet Volume


  5.4 FL


(6.5-10.1)  L


 


Neutrophils (%) (Auto)


  78.6 %


(45.0-75.0)  H


 


Lymphocytes (%) (Auto)


  9.9 %


(20.0-45.0)  L


 


Monocytes (%) (Auto)


  5.5 %


(1.0-10.0)


 


Eosinophils (%) (Auto)


  5.5 %


(0.0-3.0)  H


 


Basophils (%) (Auto)


  0.6 %


(0.0-2.0)


 


Sodium Level


  139 MMOL/L


(136-145)


 


Potassium Level


  3.1 MMOL/L


(3.5-5.1)  L


 


Chloride Level


  107 MMOL/L


()


 


Carbon Dioxide Level


  21 MMOL/L


(21-32)


 


Anion Gap


  11 mmol/L


(5-15)


 


Blood Urea Nitrogen


  19 mg/dL


(7-18)  H


 


Creatinine


  0.9 MG/DL


(0.55-1.30)


 


Estimat Glomerular Filtration


Rate  mL/min (>60)  


 


 


Glucose Level


  179 MG/DL


()  H


 


Calcium Level


  8.2 MG/DL


(8.5-10.1)  L


 


Total Bilirubin


  1.3 MG/DL


(0.2-1.0)  H


 


Direct Bilirubin


  0.3 MG/DL


(0.0-0.3)


 


Aspartate Amino Transf


(AST/SGOT) 65 U/L (15-37)


H


 


Alanine Aminotransferase


(ALT/SGPT) 75 U/L (12-78)


 


 


Alkaline Phosphatase


  90 U/L


()


 


Ammonia


  66 umol/L


(11-32)  H


 


Total Protein


  7.0 G/DL


(6.4-8.2)


 


Albumin


  2.7 G/DL


(3.4-5.0)  L


 


Globulin 4.3 g/dL  


 


Albumin/Globulin Ratio


  0.6 (1.0-2.7)


L








Height (Feet):  5


Height (Inches):  7.00


Weight (Pounds):  126


Medications





Current Medications








 Medications


  (Trade)  Dose


 Ordered  Sig/Kadeem


 Route


 PRN Reason  Start Time


 Stop Time Status Last Admin


Dose Admin


 


 Acetaminophen


  (Tylenol)  650 mg  Q6H  PRN


 ORAL


 Mild Pain/Temp > 100.5  12/25/19 14:00


 1/24/20 13:59  12/25/19 13:59


 


 


 Atorvastatin


 Calcium


  (Lipitor)  40 mg  BEDTIME


 ORAL


   12/26/19 21:00


 1/25/20 20:59  12/26/19 21:01


 


 


 Escitalopram


 Oxalate


  (Lexapro)  10 mg  DAILY


 ORAL


   12/27/19 09:00


 1/26/20 08:59  12/27/19 08:54


 


 


 Hydralazine HCl


  (Apresoline)  25 mg  Q6HR  PRN


 ORAL


 For High Blood Pressure  12/24/19 23:15


 1/23/20 23:14  12/26/19 17:21


 


 


 Lactulose


  (Cephulac)  30 gm  THREE TIMES A  DAY


 ORAL


   12/25/19 09:30


 1/24/20 09:29  12/27/19 08:53


 


 


 Lorazepam


  (Ativan)  2 mg  Q6H  PRN


 ORAL


 For Seizures  12/26/19 15:26


 1/2/20 15:25   


 


 


 Losartan Potassium


  (Cozaar)  50 mg  DAILY


 ORAL


   12/27/19 09:00


 1/26/20 08:59  12/27/19 08:54


 


 


 Nitroglycerin


  (Ntg)  0.4 mg  Q5M  PRN


 SL


 Prn Chest Pain  12/25/19 14:15


 1/24/20 14:14   


 


 


 Propranolol HCl


  (Inderal)  10 mg  Q8HR


 ORAL


   12/26/19 14:00


 1/25/20 13:59  12/26/19 13:17


 


 


 Regadenoson


  (Lexiscan)  0.4 mg  ONCE  PRN


 IV


 prn stress test  12/26/19 10:00


 12/28/19 09:59   


 


 


 Rifaximin


  (Xifaxan)  550 mg  EVERY 12  HOURS


 ORAL


   12/26/19 21:00


 1/2/20 20:59  12/27/19 08:54


 











Assessment/Plan


Assessment/Plan:


Hematology Consultation





REQ MD: Martin Wong


RFC: Ongoing thrombocytopenia


DOS: 12/27/19





HPI


71-year-old male with history of hypertension, liver cirrhosis, hep C, spinal 

stenosis, BPH, hepatic encephalopathy, chest pain, shortness of breath, left 

rotator cuff tear, arthropathy, GERD who presents with left-sided sharp chest 

pain 15 minutes prior to arrival.  Patient took 1 sublingual nitroglycerin at 

home, which did not help symptoms.  EMS gave 325 aspirin and 3 sprays of 

nitroglycerin with no improvement.  Patient notes associated shortness of 

breath.  He reports pain as 7 out of 10 at this time.  Has since been seen by 

cards and stress test cancelled, heme consulted for low plts, also noted to 

have cirrhosis as mentioned above.





Allergies:  


     No Known Allergies (Unverified , 8/19/18)





Nursing Documentation-PMH


Hx Cardiac Problems:  Yes


Hx Hypertension:  Yes


Hx Cancer:  No


Hx Gastrointestinal Problems:  No


Hx Neurological Problems:  No





Review of Systems


Constitutional:  Denies: chills, fever


Respiratory:  Reports: shortness of breath, cough


Cardiovascular:  Reports: chest pain; Denies: palpitations


Gastrointestinal:  Denies: diarrhea, vomiting


Genitourinary:  Denies: hematuria, pain


Musculoskeletal:  Denies: joint swelling


Skin:  Denies: rash, lesions


Neurological:  Denies: headache, dizziness





Gen: well appearing, nad


Head:  normocephalic, at


HEENT:  hearing grossly normal, eom grossly intact


Respiratory:  lungs clear, normal breath sounds


CV: regular rate, rhythm, no edema


GI:  soft, nd


MSK: moves extremity spontaneously


Neurologic:  grossly normal


Psychiatric: mood/affect normal





Labs: noted





Imaging: noted





Assessment and Recs:


# Thrombocytopenia with liver cirrhosis - with hepatitis C++, with gastric 

varcies and hepatic encephalopathy


--> hepatitis C is positive and platelets are low remain decreased


--> liver c/w cirrhosis, gastric varices


--> cea 11, ca 19.9 43


# Elevated tumor markers with cea and ca19.9 elevated


--> needs further eval as outpatient in re to cancer screening


--> ct a/p reviewed and shows no mass or malginancy


# Chest pain r/o acs


--> as per Dr. Otero


# Biliary stent


# Hypertension


# Liver cirrhosis


# Hep C


# Spinal stenosis


# Left rotator cuff tear


# Arthropathy


# CLEAR FOR DC





The timing of this note does not necessarily reflect the time of the patient 

was seen.





Greatly appreciate consultation.











Juwan Dawson MD Dec 27, 2019 11:41

## 2019-12-27 NOTE — NUR
HAND-OFF: 

Report given to SVETLANA Mahoney. Patient in stable condition, plan of care endorsed. NPO status 
re-enforced.

## 2019-12-28 NOTE — NUR
HAND-OFF: 

Report given to SVETLANA Kimble. The patient is resting on the bed without acute distress or 
shortness of breath. The patient's bed in the lowest position, call light in reach, and fall 
and aspiration precaution reinforced. IV site intact and patent. Endorsed plan of care.

## 2019-12-28 NOTE — NUR
NURSE NOTES:

The patient completed CT of abdomen and pelvis with and without contrast in a safe manner 
with off tele order. Will follow up the result.

## 2019-12-28 NOTE — HEMATOLOGY/ONC PROGRESS NOTE
Assessment/Plan


Assessment/Plan


Assessment and Recs:


# Thrombocytopenia with liver cirrhosis - with hepatitis C++, with gastric 

varcies and hepatic encephalopathy


--> hepatitis C is positive and platelets are low remain decreased


--> liver c/w cirrhosis, gastric varices


--> cea 11, ca 19.9 43


# Elevated tumor markers with cea and ca19.9 elevated


--> needs further eval as outpatient in re to cancer screening


--> ct a/p reviewed and shows no mass or malginancy


# Chest pain r/o acs


--> as per Dr. Otero


# Biliary stent


# Hypertension


# Liver cirrhosis


# Hep C


# Spinal stenosis


# Left rotator cuff tear


# Arthropathy


# CLEAR FOR DC





The timing of this note does not necessarily reflect the time of the patient 

was seen.





Greatly appreciate consultation.





Subjective


Allergies:  


Coded Allergies:  


     No Known Allergies (Unverified , 8/19/18)


Subjective


12/28: no overnight events, on cefepime, repeat cbc tomorrow am





Objective


Objective





Current Medications








 Medications


  (Trade)  Dose


 Ordered  Sig/Kadeem


 Route


 PRN Reason  Start Time


 Stop Time Status Last Admin


Dose Admin


 


 Acetaminophen


  (Tylenol)  650 mg  Q6H  PRN


 ORAL


 Mild Pain/Temp > 100.5  12/25/19 14:00


 1/24/20 13:59  12/25/19 13:59


 


 


 Atorvastatin


 Calcium


  (Lipitor)  40 mg  BEDTIME


 ORAL


   12/26/19 21:00


 1/25/20 20:59  12/27/19 21:06


 


 


 Barium Sulfate


  (Readi-Cat 2)  450 ml  NOW  PRN


 ORAL


 Radiology Procedure  12/28/19 08:15


 12/30/19 08:14   


 


 


 Cefepime HCl 2 gm/


 Dextrose  55 ml @ 


 110 mls/hr  EVERY 12  HOURS


 IVPB


   12/27/19 15:30


 1/3/20 15:29  12/28/19 08:57


 


 


 Escitalopram


 Oxalate


  (Lexapro)  10 mg  DAILY


 ORAL


   12/27/19 09:00


 1/26/20 08:59  12/28/19 08:58


 


 


 Hydralazine HCl


  (Apresoline)  25 mg  Q6HR  PRN


 ORAL


 For High Blood Pressure  12/24/19 23:15


 1/23/20 23:14  12/26/19 17:21


 


 


 Iohexol


  (OMNIPAQUE-300


 100ml)  100 ml  NOW  PRN


 INJ


 Radiology Procedure  12/28/19 08:15


 12/30/19 08:14   


 


 


 Lactulose


  (Cephulac)  15 gm  THREE TIMES A  DAY


 ORAL


   12/28/19 09:00


 1/24/20 09:29  12/28/19 13:13


 


 


 Lorazepam


  (Ativan)  2 mg  Q6H  PRN


 ORAL


 For Seizures  12/26/19 15:26


 1/2/20 15:25   


 


 


 Losartan Potassium


  (Cozaar)  50 mg  DAILY


 ORAL


   12/27/19 09:00


 1/26/20 08:59  12/28/19 08:58


 


 


 Nitroglycerin


  (Ntg)  0.4 mg  Q5M  PRN


 SL


 Prn Chest Pain  12/25/19 14:15


 1/24/20 14:14   


 


 


 Propranolol HCl


  (Inderal)  10 mg  Q8HR


 ORAL


   12/26/19 14:00


 1/25/20 13:59  12/27/19 21:06


 


 


 Rifaximin


  (Xifaxan)  550 mg  EVERY 12  HOURS


 ORAL


   12/26/19 21:00


 1/2/20 20:59  12/28/19 08:58


 











Last 24 Hour Vital Signs








  Date Time  Temp Pulse Resp B/P (MAP) Pulse Ox O2 Delivery O2 Flow Rate FiO2


 


12/28/19 14:00  56  119/73    


 


12/28/19 12:00 98.8 58 18 119/73 (88) 97   


 


12/28/19 12:00  56      


 


12/28/19 09:00      Room Air  


 


12/28/19 08:58    112/66    


 


12/28/19 08:00  55      


 


12/28/19 08:00 98.1 55 18 112/66 (81) 97   


 


12/28/19 06:02  55      


 


12/28/19 06:00  55  119/55    


 


12/28/19 04:00 98.4 59 20 119/55 (76) 97   


 


12/28/19 04:00  58      


 


12/28/19 00:00 99.1 65 20 108/89 (95) 99   


 


12/28/19 00:00  59      


 


12/27/19 21:06  64  121/60    


 


12/27/19 21:00      Room Air  


 


12/27/19 20:00 99.0 64 20 121/60 (80) 98   


 


12/27/19 20:00  65      


 


12/27/19 16:00  70      


 


12/27/19 16:00 98.8 68 18 139/75 (96) 99   


 


12/27/19 14:00  68  135/76    


 


12/27/19 12:00  74      


 


12/27/19 12:00 100.4 68 18 135/76 (95) 98   


 


12/27/19 09:30      Room Air  


 


12/27/19 08:54    127/65    


 


12/27/19 08:00 99.1 72 18 127/65 (85) 98   


 


12/27/19 08:00  77      


 


12/27/19 04:00 98.0 69 19 126/93 (104) 97   


 


12/27/19 04:00  59      


 


12/27/19 00:00 98.1 67 19 124/95 (105) 98   


 


12/27/19 00:00  65      


 


12/26/19 21:01  65  126/97    


 


12/26/19 21:00      Room Air  


 


12/26/19 20:00 99.1 65 20 126/82 (97) 98   


 


12/26/19 20:00  64      


 


12/26/19 17:21    146/82    


 


12/26/19 16:00  88      


 


12/26/19 16:00 97.5 80 18 146/82 (103) 99   

















Intake and Output  


 


 12/27/19 12/28/19





 18:59 06:59


 


Intake Total 480 ml 


 


Balance 480 ml 


 


  


 


Intake Oral 480 ml 


 


# Voids 2 5


 


# Bowel Movements 2 











Labs








Test


  12/26/19


06:02 12/27/19


08:05


 


White Blood Count


  10.4 K/UL


(4.8-10.8) 14.4 K/UL


(4.8-10.8)


 


Red Blood Count


  3.11 M/UL


(4.70-6.10) 3.40 M/UL


(4.70-6.10)


 


Hemoglobin


  11.2 G/DL


(14.2-18.0) 12.2 G/DL


(14.2-18.0)


 


Hematocrit


  31.8 %


(42.0-52.0) 34.7 %


(42.0-52.0)


 


Mean Corpuscular Volume 102 FL (80-99)  102 FL (80-99) 


 


Mean Corpuscular Hemoglobin


  36.0 PG


(27.0-31.0) 35.7 PG


(27.0-31.0)


 


Mean Corpuscular Hemoglobin


Concent 35.2 G/DL


(32.0-36.0) 35.0 G/DL


(32.0-36.0)


 


Red Cell Distribution Width


  14.3 %


(11.6-14.8) 13.8 %


(11.6-14.8)


 


Platelet Count


  134 K/UL


(150-450) 123 K/UL


(150-450)


 


Mean Platelet Volume


  5.5 FL


(6.5-10.1) 5.4 FL


(6.5-10.1)


 


Neutrophils (%) (Auto)


  66.3 %


(45.0-75.0) 78.6 %


(45.0-75.0)


 


Lymphocytes (%) (Auto)


  14.9 %


(20.0-45.0) 9.9 %


(20.0-45.0)


 


Monocytes (%) (Auto)


  11.8 %


(1.0-10.0) 5.5 %


(1.0-10.0)


 


Eosinophils (%) (Auto)


  6.4 %


(0.0-3.0) 5.5 %


(0.0-3.0)


 


Basophils (%) (Auto)


  0.7 %


(0.0-2.0) 0.6 %


(0.0-2.0)


 


Sodium Level


  143 MMOL/L


(136-145) 139 MMOL/L


(136-145)


 


Potassium Level


  3.9 MMOL/L


(3.5-5.1) 3.1 MMOL/L


(3.5-5.1)


 


Chloride Level


  113 MMOL/L


() 107 MMOL/L


()


 


Carbon Dioxide Level


  23 MMOL/L


(21-32) 21 MMOL/L


(21-32)


 


Anion Gap


  7 mmol/L


(5-15) 11 mmol/L


(5-15)


 


Blood Urea Nitrogen


  25 mg/dL


(7-18) 19 mg/dL


(7-18)


 


Creatinine


  0.9 MG/DL


(0.55-1.30) 0.9 MG/DL


(0.55-1.30)


 


Estimat Glomerular Filtration


Rate  mL/min (>60) 


   mL/min (>60) 


 


 


Glucose Level


  110 MG/DL


() 179 MG/DL


()


 


Calcium Level


  8.5 MG/DL


(8.5-10.1) 8.2 MG/DL


(8.5-10.1)


 


Total Bilirubin


  


  1.3 MG/DL


(0.2-1.0)


 


Direct Bilirubin


  


  0.3 MG/DL


(0.0-0.3)


 


Aspartate Amino Transf


(AST/SGOT) 


  65 U/L (15-37) 


 


 


Alanine Aminotransferase


(ALT/SGPT) 


  75 U/L (12-78) 


 


 


Alkaline Phosphatase


  


  90 U/L


()


 


Ammonia


  


  66 umol/L


(11-32)


 


Total Protein


  


  7.0 G/DL


(6.4-8.2)


 


Albumin


  


  2.7 G/DL


(3.4-5.0)


 


Globulin  4.3 g/dL 


 


Albumin/Globulin Ratio  0.6 (1.0-2.7) 








Height (Feet):  5


Height (Inches):  7.00


Weight (Pounds):  126


Objective


Gen: well appearing, nad


Head:  normocephalic, at


HEENT:  hearing grossly normal, eom grossly intact


Respiratory:  lungs clear, normal breath sounds


CV: regular rate, rhythm, no edema


GI:  soft, nd


MSK: moves extremity spontaneously


Neurologic:  grossly normal


Psychiatric: mood/affect normal











Juwan Dawson MD Dec 28, 2019 15:25

## 2019-12-28 NOTE — PROGRESS NOTE
DATE:  12/27/2019

SUBJECTIVE:  The patient is in bed _____, withdrawn, depressed,

hopelessness.



MENTAL STATUS EXAMINATION:  Alert, oriented times self, place, and

situation.  Mood is depressed.  Affect is blunted, congruent with mood,

appropriate.



Thought process is linear and goal oriented.  Thought content, no

suicidal or homicidal ideation.



Cognition is intact.  Insight and judgment are fair.



ASSESSMENT:  Major depressive disorder.



PLAN:

1. Continue Lexapro 10 mg in the morning.

2. Provide the patient with reality orientation and supportive

therapy.









  ______________________________________________

  Deana Duran M.D.





DR:  JAJA

D:  12/27/2019 22:45

T:  12/28/2019 00:32

JOB#:  [id number]

CC:

## 2019-12-28 NOTE — GENERAL PROGRESS NOTE
Assessment/Plan


Problem List:  


(1) HTN (hypertension)


ICD Codes:  I10 - Essential (primary) hypertension


SNOMED:  36374635


(2) Chest pain


ICD Codes:  R07.9 - Chest pain, unspecified


SNOMED:  45339474


Status:  progressing


Assessment/Plan:


no cp


r/o acs per cardiology


stress test was done


cancelled dc due to increase in wbc and low grade fever yesterday


also k dropped(is replaced)





Subjective


ROS Limited/Unobtainable:  Yes


Allergies:  


Coded Allergies:  


     No Known Allergies (Unverified , 8/19/18)





Objective





Last 24 Hour Vital Signs








  Date Time  Temp Pulse Resp B/P (MAP) Pulse Ox O2 Delivery O2 Flow Rate FiO2


 


12/28/19 06:02  55      


 


12/28/19 06:00  55  119/55    


 


12/28/19 04:00 98.4 59 20 119/55 (76) 97   


 


12/28/19 04:00  58      


 


12/28/19 00:00 99.1 65 20 108/89 (95) 99   


 


12/28/19 00:00  59      


 


12/27/19 21:06  64  121/60    


 


12/27/19 21:00      Room Air  


 


12/27/19 20:00 99.0 64 20 121/60 (80) 98   


 


12/27/19 20:00  65      


 


12/27/19 16:00  70      


 


12/27/19 16:00 98.8 68 18 139/75 (96) 99   


 


12/27/19 14:00  68  135/76    


 


12/27/19 12:00  74      


 


12/27/19 12:00 100.4 68 18 135/76 (95) 98   


 


12/27/19 09:30      Room Air  


 


12/27/19 08:54    127/65    

















Intake and Output  


 


 12/27/19 12/28/19





 19:00 07:00


 


Intake Total 480 ml 


 


Balance 480 ml 


 


  


 


Intake Oral 480 ml 


 


# Voids 2 5


 


# Bowel Movements 2 








Height (Feet):  5


Height (Inches):  7.00


Weight (Pounds):  126


Cardiovascular:  normal rate


Respiratory/Chest:  lungs clear











Martin Payan MD Dec 28, 2019 08:14

## 2019-12-28 NOTE — DIAGNOSTIC IMAGING REPORT
EXAM:

  CT Abdomen and Pelvis Without and With Intravenous Contrast

 

CLINICAL HISTORY:

  ABD PAIN

 

TECHNIQUE:

  Axial computed tomography images of the abdomen and pelvis without and 

with intravenous contrast.  CTDI is 12.8 mGy and DLP is 718.6 mGy-cm.  

One or more of the following dose reduction techniques were used: 

automated exposure control, adjustment of the mA and/or kV according to 

patient size, use of iterative reconstruction technique.

 

COMPARISON:

  CT chest 12/24/19

 

FINDINGS:

  Lung bases:  Mild bibasilar lung atelectasis.

  Heart:  Cardiomegaly.

  Mediastinum:  Small hiatal hernia.

 

 ABDOMEN:

  Liver:  Nodular, cirrhotic appearance of the liver.  Low-density 

lesions in the liver, largest 10 mm.

  Gallbladder and bile ducts:  CBD stent in dilated CBD measuring 12 mm.  

Enhancing gallbladder wall.  No calcified stones.

  Pancreas:  Unremarkable.  No mass.  No ductal dilation.

  Spleen:  Splenomegaly.

  Adrenals:  Unremarkable.  No mass.

  Kidneys and ureters:  Low-density lesions in the right kidney, largest 

4.8 cm with a thin enhancing septation.  A few tiny nonobstructive left 

renal stones, largest 2 mm.

  Stomach and bowel:  Mild fluid-filled small bowel loops may be 

enteritis with ileus.  No bowel obstruction.

 

 PELVIS:

  Appendix:  No findings to suggest acute appendicitis.

  Bladder:  Unremarkable.  No mass.  No stones.

  Reproductive:  Unremarkable as visualized.

 

 ABDOMEN and PELVIS:

  Intraperitoneal space:  Unremarkable.  No free air.  No significant 

fluid collection.

  Bones/joints:  Sclerotic focus in the right iliac bone.  Postop changes 

of laminectomy of the lumbar spine.  Lumbar spine right scoliosis with 

degenerative changes.  Grade 1 retrolisthesis of L3 on L4 likely 

degenerative.  No acute fracture.  No dislocation.

  Soft tissues:  Small bilateral fat-containing inguinal hernias.

  Vasculature:  Atherosclerotic vascular disease.  Varices.  No abdominal 

aortic aneurysm.

  Lymph nodes:  Unremarkable.  No enlarged lymph nodes.

 

IMPRESSION:     

1.  Nodular, cirrhotic appearance of the liver.  Varices.  Splenomegaly.

2.  CBD stent in dilated CBD measuring 12 mm.

3.  Low-density lesions in the right kidney, largest 4.8 cm with a thin 

enhancing septation.

4.  A few tiny nonobstructive left renal stones, largest 2 mm.

5.  Mild fluid-filled small bowel loops may be enteritis with ileus.  No 

bowel obstruction.

## 2019-12-28 NOTE — NUR
NURSE NOTES:

Received report from SVETLANA Mahoney. Patient is awake, lying in semi echevarria's; resting 
comfortably. A/Ox3. Primarily Occitan speaking. Checked IV site and flushed. No erythema, 
bleeding or infiltration noted. Bed at lowest position, brakes on, [id number]. Call light 
within reach. Will continue to monitor.

## 2019-12-28 NOTE — NUR
NURSE NOTES:

Dr. Payan and Dr. De La Cruz at the bedside assessed the patient. Dr. De La Cruz ordered CT of 
abdomen and pelvis with and without contrast and adjusted medication. The patient is stable 
without acute distress or shortness of breath. Will continue plan of care.

## 2019-12-28 NOTE — CONSULTATION
DATE OF CONSULTATION:  12/28/2019

INFECTIOUS DISEASES CONSULTATION



CONSULTING PHYSICIAN:  Mary Blount M.D.



REFERRING PHYSICIAN:  Rito Dubon D.O.



REASON FOR CONSULTATION:  Fever.



HISTORY OF PRESENTING ILLNESS:  This is a 71-year-old gentleman with

history of hypertension, who came in and was found to have fevers.  An

Infectious Diseases consultation has been obtained for antibiotics.



PAST MEDICAL HISTORY:

1. History of hypertension.

2. History of hepatitis C.

3. Cirrhosis.

4. Spinal stenosis.

5. Benign prostatic hypertrophy.

6. Hepatic encephalopathy.

7. Left-sided shoulder rotator cuff tear.

8. GERD.



SOCIAL HISTORY:  Unknown.



FAMILY HISTORY:  Unknown.



REVIEW OF SYSTEMS:  Unable to obtain currently.



MEDICATIONS:  As an inpatient, he is on lactulose, barium sulfate,

cefepime, losartan, Lexapro, atorvastatin, rifaximin, Ativan, Inderal,

nitroglycerin, Tylenol, hydralazine.



ALLERGIES:  No known drug allergies.



PHYSICAL EXAMINATION:

VITAL SIGNS:  Temperature of 98.1, T-max of 100.4, pulse of 55, respiratory

rate of 18, blood pressure 112/66, O2 saturation of 97%.

HEENT:  Pupils equally reactive to light and accommodation.  Mouth appears

clean without thrush.

NECK:  Supple.  No adenopathy.  No JVD.

CARDIOVASCULAR:  Regular rate and rhythm.  No murmurs.

LUNGS:  Clear to auscultation bilaterally.  No crackles.  No

wheezes.

ABDOMEN:  Soft and nontender.  No organomegaly.

EXTREMITIES:  No cyanosis, no clubbing, no edema.



LABORATORY AND DIAGNOSTIC DATA:  White count 15.4, hemoglobin 12.2,

hematocrit 34.7, , platelet count of 123.  Sodium 139, potassium

3.1, chloride 107, bicarb 21, BUN 19, creatinine 0.9, glucose 179, calcium

8.2.  Total bilirubin 1.3, direct bilirubin 0.3.  AST 65, ALT 75, alkaline

phosphatase 90.  Ammonia level of 66.  Total protein 7.  Albumin 2.7.

Hepatitis A IgM antibody is negative.  Hepatitis B surface antigen is

negative.  Hepatitis B core IgM is negative.  Hepatitis C antibody is more

than 11.  Nasal swab was negative for MRSA.  Rectal swab was negative for

VRE.  Chest x-ray was unremarkable.  CT chest angiogram showed no

pulmonary embolism.  No acute process in the lung, cirrhotic changes of

the liver noted.



ASSESSMENT:  This is a 71-year-old gentleman with history of hypertension,

hepatitis C, liver cirrhosis, who comes in with fevers, would like to rule

out.



1. Urinary tract infection as a possibility.

2. Would also like to rule out SBP as a possibility.



PLAN:

1. We will follow up on blood cultures and urine cultures already

ordered.

2. We will continue cefepime.

3. We will follow up cultures and adjust antibiotics accordingly.



I would like to thank, Dr. Rito Dubon, for this consultation.









  ______________________________________________

  Mary Blount M.D. DR:  LAURA

D:  12/28/2019 10:45

T:  12/29/2019 02:50

JOB#:  [id number]

CC:  Rito Dubon D.O.

## 2019-12-28 NOTE — NUR
NURSE NOTES:

Received report from SVETLANA Patton. The patient is resting on the bed without acute distress or 
shortness of breath. The patient's bed in the lowest position, call light in reach, and fall 
and aspiration precaution reinforced. IV site intact and patent. Will continue plan of care.

## 2019-12-28 NOTE — CARDIAC ELECTROPHYSIOLOGY PN
Assessment/Plan


Assessment/Plan


1. Atypical chest pain.  The patient does not currently have chest pain.


He has already ruled out for myocardial infarction.  His EKG shows right


bundle-branch block and left posterior fascicular block. 


Had Dobutamine Cardiolite stress yesterday that showed no ischemia


Echo EF 65%. On Inderal for esophageal varices but gheld for temitope in 50s.


 Stable form cardiac stand point for DC





2. Bifascicular block with right bundle-branch block and left posterior


fascicular block.  The patient denies any history of syncope.





3. Hypertension on Inderal 10 q 8 qnd p.r.n. hydralazine.


4. Hepatitis C, cirrhosis, and history of encephalopathy and history of


biliary stent.  Currently on lactulose and Inderal.  Further evaluation by Dr. Martinez.


5. New fever and high WBC on Cefepime per ID





DW RN, Dr Thomas





Subjective


Subjective


Ruled out for MI. No PE.RBBB, LPFB. 


Had Dobutamine stress test yesterday


Echo EF 65%





Objective





Last 24 Hour Vital Signs








  Date Time  Temp Pulse Resp B/P (MAP) Pulse Ox O2 Delivery O2 Flow Rate FiO2


 


12/28/19 14:00  56  119/73    


 


12/28/19 12:00 98.8 58 18 119/73 (88) 97   


 


12/28/19 12:00  56      


 


12/28/19 09:00      Room Air  


 


12/28/19 08:58    112/66    


 


12/28/19 08:00  55      


 


12/28/19 08:00 98.1 55 18 112/66 (81) 97   


 


12/28/19 06:02  55      


 


12/28/19 06:00  55  119/55    


 


12/28/19 04:00 98.4 59 20 119/55 (76) 97   


 


12/28/19 04:00  58      


 


12/28/19 00:00 99.1 65 20 108/89 (95) 99   


 


12/28/19 00:00  59      


 


12/27/19 21:06  64  121/60    


 


12/27/19 21:00      Room Air  


 


12/27/19 20:00 99.0 64 20 121/60 (80) 98   


 


12/27/19 20:00  65      


 


12/27/19 16:00  70      


 


12/27/19 16:00 98.8 68 18 139/75 (96) 99   

















Intake and Output  


 


 12/27/19 12/28/19





 19:00 07:00


 


Intake Total 480 ml 


 


Balance 480 ml 


 


  


 


Intake Oral 480 ml 


 


# Voids 2 5


 


# Bowel Movements 2 








Objective


HEAD AND NECK:  No JVD.


LUNGS:  Clear.


CARDIOVASCULAR:  Regular S1 and S2 with no gallop or murmur. 


ABDOMEN:  Soft and nontender.


EXTREMITIES:  No pitting edema.











Levi Otero MD Dec 28, 2019 15:40

## 2019-12-28 NOTE — GENERAL PROGRESS NOTE
Assessment/Plan


Status:  progressing


Assessment/Plan:


cirrhosis


biliary stent


thrombocytopenia


gastric varices


hep C


elevated CEA and CA 19-9





fu hepatitis panel>>> hepatitis C positive


 xifaxan


off norvasc


on propanolol


repeat labs


needs out patient fu for hep c treatment


plan CT of abd and pelvic


decrease lactulose


add xifaxan





Subjective


ROS Limited/Unobtainable:  Yes


Allergies:  


Coded Allergies:  


     No Known Allergies (Unverified , 8/19/18)





Objective





Last 24 Hour Vital Signs








  Date Time  Temp Pulse Resp B/P (MAP) Pulse Ox O2 Delivery O2 Flow Rate FiO2


 


12/28/19 06:02  55      


 


12/28/19 06:00  55  119/55    


 


12/28/19 04:00 98.4 59 20 119/55 (76) 97   


 


12/28/19 04:00  58      


 


12/28/19 00:00 99.1 65 20 108/89 (95) 99   


 


12/28/19 00:00  59      


 


12/27/19 21:06  64  121/60    


 


12/27/19 21:00      Room Air  


 


12/27/19 20:00 99.0 64 20 121/60 (80) 98   


 


12/27/19 20:00  65      


 


12/27/19 16:00  70      


 


12/27/19 16:00 98.8 68 18 139/75 (96) 99   


 


12/27/19 14:00  68  135/76    


 


12/27/19 12:00  74      


 


12/27/19 12:00 100.4 68 18 135/76 (95) 98   


 


12/27/19 09:30      Room Air  


 


12/27/19 08:54    127/65    

















Intake and Output  


 


 12/27/19 12/28/19





 19:00 07:00


 


Intake Total 480 ml 


 


Balance 480 ml 


 


  


 


Intake Oral 480 ml 


 


# Voids 2 5


 


# Bowel Movements 2 








Height (Feet):  5


Height (Inches):  7.00


Weight (Pounds):  126


General Appearance:  alert


EENT:  normal ENT inspection


Neck:  supple


Cardiovascular:  normal rate


Respiratory/Chest:  chest wall non-tender


Abdomen:  normal bowel sounds, non tender, soft


Extremities:  non-tender











Neal De La Cruz MD Dec 28, 2019 08:16

## 2019-12-29 NOTE — NUR
NURSE NOTES:

Received report from SVETLANA Kimble. Patient in bed resting, no active s/s cardiac, 
respiratory distress noticed at this time. Patient AO x3, SR with HR 58, on room air. IV on 
right FA 20G, asymptomatic, patent, intact. Endorsed discharged was cancelled due to 
increase in WBC and low grade fever. Bed in lowest position, side rails upx2, call light 
within reach, bed alarm on. Will continue to monitor.

## 2019-12-29 NOTE — NUR
NURSE NOTES:

Received report from SVETLANA Torres. Patient is asleep, arousable to name, lying in semi echevarria's; 
resting comfortably. A/Ox4. Able to make needs known. Denies pain at this time. No signs of 
acute distress noted. Checked IV site and flushed with ongoing IVF as prescribed. No signs 
of erythema, bleeding or infiltration noted. Bed at lowest position, brakes on, siderailsx2. 
Call light within reach. Will continue to monitor. 

-------------------------------------------------------------------------------

Addendum: 12/29/19 at 2316 by Shyanne Erickson RN

-------------------------------------------------------------------------------

Checked IV site and flushed on saline lock.

## 2019-12-29 NOTE — PROGRESS NOTE
DATE:  12/28/2019

LATE ENTRY



SUBJECTIVE:  The patient is presenting with depressed mood, weak, walking

around the unit, more interactive, less anxiety.  No agitation noted.



MENTAL STATUS EXAMINATION:  Alert and oriented times self, place, and

situation.  Mood is anxious.  Affect is constricted.  Congruent mood.

Thought process is concrete.  Thought content, no suicidal or homicidal

ideation.  Cognition is improved.



ASSESSMENT:  Major depressive disorder, anxiety disorder.



PLAN:

1. We will continue current psychotropic medication.

2. Provide the patient with reality orientation and supportive

therapy.









  ______________________________________________

  Deana Duran M.D.





DR:  Hay

D:  12/29/2019 15:16

T:  12/29/2019 17:38

JOB#:  [id number]

CC:

## 2019-12-29 NOTE — NUR
NURSE NOTES:

Per Dr. Otero and Dr. Payan , patient off tele, transfer to [facility] surg. Order noted, 
entered, carried out. CN made aware.

## 2019-12-29 NOTE — INFECTIOUS DISEASES PROG NOTE
Assessment/Plan


Assessment/Plan


A;


Fever & leukocytosis resolved


Cirrhosis


Hepatitis C


CBD stent


Bifascicular heart block


BPH


Gastric varices


Seborrheic dermatitis


P;


Continue Cefepime


If cultures remain negative,


will stop antibiotic soon





Subjective


ROS Limited/Unobtainable:  No


Constitutional:  Reports: no symptoms


Respiratory:  Reports: no symptoms


Genitourinary:  Reports: no symptoms


Allergies:  


Coded Allergies:  


     No Known Allergies (Unverified , 8/19/18)





Objective


Vital Signs





Last 24 Hour Vital Signs








  Date Time  Temp Pulse Resp B/P (MAP) Pulse Ox O2 Delivery O2 Flow Rate FiO2


 


12/29/19 08:44    113/64    


 


12/29/19 08:00 98.1 52 18 113/64 (80) 97   


 


12/29/19 08:00  48      


 


12/29/19 06:19  56  125/75    


 


12/29/19 04:00  60      


 


12/29/19 04:00 97.7 56 20 125/75 (92) 97   


 


12/29/19 00:00 97.9 56 20 131/69 (89) 98   


 


12/29/19 00:00  57      


 


12/28/19 21:04  56  125/65    


 


12/28/19 21:00      Room Air  


 


12/28/19 20:00 97.9 56 18 125/65 (85) 98   


 


12/28/19 20:00  58      


 


12/28/19 16:00 98.1 60 18 108/54 (72) 97   


 


12/28/19 16:00  58      


 


12/28/19 14:00  56  119/73    


 


12/28/19 12:00 98.8 58 18 119/73 (88) 97   


 


12/28/19 12:00  56      








Height (Feet):  5


Height (Inches):  7.00


Weight (Pounds):  126


General Appearance:  no acute distress


HEENT:  mucous membranes moist


Respiratory/Chest:  lungs clear


Cardiovascular:  normal rate


Abdomen:  soft, non tender


Extremities:  no edema


Skin:  rash, other - facial





Microbiology








 Date/Time


Source Procedure


Growth Status


 


 


 12/27/19 16:00


Blood Blood Culture - Preliminary


NO GROWTH AFTER 24 HOURS Resulted











Laboratory Tests








Test


  12/29/19


06:34


 


White Blood Count


  9.5 K/UL


(4.8-10.8)


 


Red Blood Count


  3.18 M/UL


(4.70-6.10)  L


 


Hemoglobin


  11.4 G/DL


(14.2-18.0)  L


 


Hematocrit


  32.9 %


(42.0-52.0)  L


 


Mean Corpuscular Volume


  104 FL (80-99)


H


 


Mean Corpuscular Hemoglobin


  35.9 PG


(27.0-31.0)  H


 


Mean Corpuscular Hemoglobin


Concent 34.7 G/DL


(32.0-36.0)


 


Red Cell Distribution Width


  13.8 %


(11.6-14.8)


 


Platelet Count


  121 K/UL


(150-450)  L


 


Mean Platelet Volume


  6.8 FL


(6.5-10.1)


 


Neutrophils (%) (Auto)


  63.7 %


(45.0-75.0)


 


Lymphocytes (%) (Auto)


  14.9 %


(20.0-45.0)  L


 


Monocytes (%) (Auto)


  14.7 %


(1.0-10.0)  H


 


Eosinophils (%) (Auto)


  6.1 %


(0.0-3.0)  H


 


Basophils (%) (Auto)


  0.5 %


(0.0-2.0)


 


Sodium Level


  141 MMOL/L


(136-145)


 


Potassium Level


  3.5 MMOL/L


(3.5-5.1)


 


Chloride Level


  112 MMOL/L


()  H


 


Carbon Dioxide Level


  20 MMOL/L


(21-32)  L


 


Anion Gap


  9 mmol/L


(5-15)


 


Blood Urea Nitrogen


  24 mg/dL


(7-18)  H


 


Creatinine


  0.9 MG/DL


(0.55-1.30)


 


Estimat Glomerular Filtration


Rate  mL/min (>60)  


 


 


Glucose Level


  113 MG/DL


()  H


 


Calcium Level


  8.3 MG/DL


(8.5-10.1)  L


 


Phosphorus Level


  2.9 MG/DL


(2.5-4.9)


 


Magnesium Level


  2.0 MG/DL


(1.8-2.4)


 


Total Bilirubin


  0.6 MG/DL


(0.2-1.0)


 


Aspartate Amino Transf


(AST/SGOT) 60 U/L (15-37)


H


 


Alanine Aminotransferase


(ALT/SGPT) 69 U/L (12-78)


 


 


Alkaline Phosphatase


  91 U/L


()


 


Ammonia


  64 umol/L


(11-32)  H


 


Total Protein


  6.8 G/DL


(6.4-8.2)


 


Albumin


  2.5 G/DL


(3.4-5.0)  L


 


Globulin 4.3 g/dL  


 


Albumin/Globulin Ratio


  0.6 (1.0-2.7)


L











Current Medications








 Medications


  (Trade)  Dose


 Ordered  Sig/Kadeem


 Route


 PRN Reason  Start Time


 Stop Time Status Last Admin


Dose Admin


 


 Acetaminophen


  (Tylenol)  650 mg  Q6H  PRN


 ORAL


 Mild Pain/Temp > 100.5  12/25/19 14:00


 1/24/20 13:59  12/25/19 13:59


 


 


 Atorvastatin


 Calcium


  (Lipitor)  40 mg  BEDTIME


 ORAL


   12/26/19 21:00


 1/25/20 20:59  12/28/19 21:02


 


 


 Barium Sulfate


  (Readi-Cat 2)  450 ml  NOW  PRN


 ORAL


 Radiology Procedure  12/28/19 08:15


 12/30/19 08:14   


 


 


 Cefepime HCl 2 gm/


 Dextrose  55 ml @ 


 110 mls/hr  EVERY 12  HOURS


 IVPB


   12/27/19 15:30


 1/3/20 15:29  12/29/19 08:44


 


 


 Escitalopram


 Oxalate


  (Lexapro)  10 mg  DAILY


 ORAL


   12/27/19 09:00


 1/26/20 08:59  12/29/19 08:44


 


 


 Hydralazine HCl


  (Apresoline)  25 mg  Q6HR  PRN


 ORAL


 For High Blood Pressure  12/24/19 23:15


 1/23/20 23:14  12/26/19 17:21


 


 


 Iohexol


  (OMNIPAQUE-300


 100ml)  100 ml  NOW  PRN


 INJ


 Radiology Procedure  12/28/19 08:15


 12/30/19 08:14   


 


 


 Lactulose


  (Cephulac)  15 gm  THREE TIMES A  DAY


 ORAL


   12/28/19 09:00


 1/24/20 09:29  12/29/19 08:44


 


 


 Lorazepam


  (Ativan)  2 mg  Q6H  PRN


 ORAL


 For Seizures  12/26/19 15:26


 1/2/20 15:25   


 


 


 Losartan Potassium


  (Cozaar)  50 mg  DAILY


 ORAL


   12/27/19 09:00


 1/26/20 08:59  12/29/19 08:44


 


 


 Nitroglycerin


  (Ntg)  0.4 mg  Q5M  PRN


 SL


 Prn Chest Pain  12/25/19 14:15


 1/24/20 14:14   


 


 


 Propranolol HCl


  (Inderal)  10 mg  Q8HR


 ORAL


   12/26/19 14:00


 1/25/20 13:59  12/29/19 06:19


 


 


 Rifaximin


  (Xifaxan)  550 mg  EVERY 12  HOURS


 ORAL


   12/26/19 21:00


 1/2/20 20:59  12/29/19 08:44


 

















Jeremy Martin MD Dec 29, 2019 10:29

## 2019-12-29 NOTE — NUR
NURSE NOTES:

Dr. Payan made aware patient c/o generalized itchiness. Per MD 25mg PO q4h prn. Order 
noted, entered, carried out.

## 2019-12-29 NOTE — HEMATOLOGY/ONC PROGRESS NOTE
Assessment/Plan


Assessment/Plan


Assessment and Recs:


# Thrombocytopenia with liver cirrhosis - with hepatitis C++, with gastric 

varcies and hepatic encephalopathy


--> hepatitis C is positive and platelets are low remain decreased


--> liver c/w cirrhosis, gastric varices


--> cea 11, ca 19.9 43


# Elevated tumor markers with cea and ca19.9 elevated


--> needs further eval as outpatient in re to cancer screening


--> ct a/p reviewed and shows no mass or malginancy


# Chest pain r/o acs


--> as per Dr. Otero


# Biliary stent


# Hypertension


# Liver cirrhosis


# Hep C


# Spinal stenosis


# Left rotator cuff tear


# Arthropathy





The timing of this note does not necessarily reflect the time of the patient 

was seen.





Greatly appreciate consultation.





Subjective


HEENT:  Denies: no symptoms, eye pain, blurred vision, tearing, double vision, 

ear pain, ear discharge, nose pain, nose congestion, throat pain, throat 

swelling, mouth pain, mouth swelling, other


Cardiovascular:  Denies: no symptoms, chest pain, edema, irregular heart rate, 

lightheadedness, palpitations, syncope, other


Respiratory:  Denies: no symptoms, cough, shortness of breath, SOB with 

excertion, SOB at rest, sputum, wheezing, other


Gastrointestinal/Abdominal:  Denies: no symptoms, abdomen distended, abdominal 

pain, black stools, tarry stools, blood in stool, constipated, diarrhea, 

difficulty swallowing, nausea, poor appetite, poor fluid intake, rectal bleeding

, vomiting, other


Genitourinary:  Denies: no symptoms, burning, discharge, frequency, flank pain, 

hematuria, incontinence, pain, urgency, other


Neurologic/Psychiatric:  Denies: no symptoms, anxiety, depressed, emotional 

problems, headache, numbness, paresthesia, pre-existing deficit, seizure, 

tingling, tremors, weakness, other


Endocrine:  Denies: no symptoms, excessive sweating, flushing, intolerance to 

cold, intolerance to heat, increased hunger, increased thirst, increased urine, 

unexplained weight gain, unexplained weight loss, other


Hematologic/Lymphatic:  Denies: no symptoms, anemia, easy bleeding, easy 

bruising, adenopathy, other


Allergies:  


Coded Allergies:  


     No Known Allergies (Unverified , 8/19/18)


Subjective


12/28: no overnight events, on cefepime, repeat cbc tomorrow am 


12/29: wbc higher, abx changed to cefepime as per geo eaton





Objective


Objective





Current Medications








 Medications


  (Trade)  Dose


 Ordered  Sig/Kadeem


 Route


 PRN Reason  Start Time


 Stop Time Status Last Admin


Dose Admin


 


 Acetaminophen


  (Tylenol)  650 mg  Q6H  PRN


 ORAL


 Mild Pain/Temp > 100.5  12/25/19 14:00


 1/24/20 13:59  12/25/19 13:59


 


 


 Atorvastatin


 Calcium


  (Lipitor)  40 mg  BEDTIME


 ORAL


   12/26/19 21:00


 1/25/20 20:59  12/28/19 21:02


 


 


 Barium Sulfate


  (Readi-Cat 2)  450 ml  NOW  PRN


 ORAL


 Radiology Procedure  12/28/19 08:15


 12/30/19 08:14   


 


 


 Cefepime HCl 2 gm/


 Dextrose  55 ml @ 


 110 mls/hr  EVERY 12  HOURS


 IVPB


   12/27/19 15:30


 1/3/20 15:29  12/29/19 08:44


 


 


 Diphenhydramine


 HCl


  (Benadryl)  25 mg  Q4H  PRN


 ORAL


 Itching  12/29/19 11:45


 1/28/20 11:44  12/29/19 11:53


 


 


 Escitalopram


 Oxalate


  (Lexapro)  10 mg  DAILY


 ORAL


   12/27/19 09:00


 1/26/20 08:59  12/29/19 08:44


 


 


 Hydralazine HCl


  (Apresoline)  25 mg  Q6HR  PRN


 ORAL


 For High Blood Pressure  12/24/19 23:15


 1/23/20 23:14  12/26/19 17:21


 


 


 Iohexol


  (OMNIPAQUE-300


 100ml)  100 ml  NOW  PRN


 INJ


 Radiology Procedure  12/28/19 08:15


 12/30/19 08:14   


 


 


 Lactulose


  (Cephulac)  15 gm  THREE TIMES A  DAY


 ORAL


   12/28/19 09:00


 1/24/20 09:29  12/29/19 17:45


 


 


 Lorazepam


  (Ativan)  2 mg  Q6H  PRN


 ORAL


 For Seizures  12/26/19 15:26


 1/2/20 15:25   


 


 


 Losartan Potassium


  (Cozaar)  50 mg  DAILY


 ORAL


   12/27/19 09:00


 1/26/20 08:59  12/29/19 08:44


 


 


 Nitroglycerin


  (Ntg)  0.4 mg  Q5M  PRN


 SL


 Prn Chest Pain  12/25/19 14:15


 1/24/20 14:14   


 


 


 Propranolol HCl


  (Inderal)  10 mg  Q8HR


 ORAL


   12/26/19 14:00


 1/25/20 13:59  12/29/19 06:19


 


 


 Rifaximin


  (Xifaxan)  550 mg  EVERY 12  HOURS


 ORAL


   12/26/19 21:00


 1/2/20 20:59  12/29/19 08:44


 











Last 24 Hour Vital Signs








  Date Time  Temp Pulse Resp B/P (MAP) Pulse Ox O2 Delivery O2 Flow Rate FiO2


 


12/29/19 16:00  53      


 


12/29/19 16:00 97.5 54 18 117/69 (85) 95   


 


12/29/19 13:02  50  121/71    


 


12/29/19 12:00  53      


 


12/29/19 12:00 97.9 50 20 121/71 (88) 98   


 


12/29/19 09:00      Room Air  


 


12/29/19 08:44    113/64    


 


12/29/19 08:00 98.1 52 18 113/64 (80) 97   


 


12/29/19 08:00  48      


 


12/29/19 06:19  56  125/75    


 


12/29/19 04:00  60      


 


12/29/19 04:00 97.7 56 20 125/75 (92) 97   


 


12/29/19 00:00 97.9 56 20 131/69 (89) 98   


 


12/29/19 00:00  57      


 


12/28/19 21:04  56  125/65    


 


12/28/19 21:00      Room Air  


 


12/28/19 20:00 97.9 56 18 125/65 (85) 98   


 


12/28/19 20:00  58      


 


12/28/19 16:00 98.1 60 18 108/54 (72) 97   


 


12/28/19 16:00  58      


 


12/28/19 14:00  56  119/73    


 


12/28/19 12:00 98.8 58 18 119/73 (88) 97   


 


12/28/19 12:00  56      


 


12/28/19 09:00      Room Air  


 


12/28/19 08:58    112/66    


 


12/28/19 08:00  55      


 


12/28/19 08:00 98.1 55 18 112/66 (81) 97   


 


12/28/19 06:02  55      


 


12/28/19 06:00  55  119/55    


 


12/28/19 04:00 98.4 59 20 119/55 (76) 97   


 


12/28/19 04:00  58      


 


12/28/19 00:00 99.1 65 20 108/89 (95) 99   


 


12/28/19 00:00  59      


 


12/27/19 21:06  64  121/60    


 


12/27/19 21:00      Room Air  


 


12/27/19 20:00 99.0 64 20 121/60 (80) 98   


 


12/27/19 20:00  65      

















Intake and Output  


 


 12/28/19 12/29/19





 19:00 07:00


 


Intake Total 360 ml 120 ml


 


Balance 360 ml 120 ml


 


  


 


Intake Oral 360 ml 120 ml


 


# Voids 4 1


 


# Bowel Movements  1











Labs








Test


  12/27/19


08:05 12/29/19


06:34


 


White Blood Count


  14.4 K/UL


(4.8-10.8) 9.5 K/UL


(4.8-10.8)


 


Red Blood Count


  3.40 M/UL


(4.70-6.10) 3.18 M/UL


(4.70-6.10)


 


Hemoglobin


  12.2 G/DL


(14.2-18.0) 11.4 G/DL


(14.2-18.0)


 


Hematocrit


  34.7 %


(42.0-52.0) 32.9 %


(42.0-52.0)


 


Mean Corpuscular Volume 102 FL (80-99)  104 FL (80-99) 


 


Mean Corpuscular Hemoglobin


  35.7 PG


(27.0-31.0) 35.9 PG


(27.0-31.0)


 


Mean Corpuscular Hemoglobin


Concent 35.0 G/DL


(32.0-36.0) 34.7 G/DL


(32.0-36.0)


 


Red Cell Distribution Width


  13.8 %


(11.6-14.8) 13.8 %


(11.6-14.8)


 


Platelet Count


  123 K/UL


(150-450) 121 K/UL


(150-450)


 


Mean Platelet Volume


  5.4 FL


(6.5-10.1) 6.8 FL


(6.5-10.1)


 


Neutrophils (%) (Auto)


  78.6 %


(45.0-75.0) 63.7 %


(45.0-75.0)


 


Lymphocytes (%) (Auto)


  9.9 %


(20.0-45.0) 14.9 %


(20.0-45.0)


 


Monocytes (%) (Auto)


  5.5 %


(1.0-10.0) 14.7 %


(1.0-10.0)


 


Eosinophils (%) (Auto)


  5.5 %


(0.0-3.0) 6.1 %


(0.0-3.0)


 


Basophils (%) (Auto)


  0.6 %


(0.0-2.0) 0.5 %


(0.0-2.0)


 


Sodium Level


  139 MMOL/L


(136-145) 141 MMOL/L


(136-145)


 


Potassium Level


  3.1 MMOL/L


(3.5-5.1) 3.5 MMOL/L


(3.5-5.1)


 


Chloride Level


  107 MMOL/L


() 112 MMOL/L


()


 


Carbon Dioxide Level


  21 MMOL/L


(21-32) 20 MMOL/L


(21-32)


 


Anion Gap


  11 mmol/L


(5-15) 9 mmol/L


(5-15)


 


Blood Urea Nitrogen


  19 mg/dL


(7-18) 24 mg/dL


(7-18)


 


Creatinine


  0.9 MG/DL


(0.55-1.30) 0.9 MG/DL


(0.55-1.30)


 


Estimat Glomerular Filtration


Rate  mL/min (>60) 


   mL/min (>60) 


 


 


Glucose Level


  179 MG/DL


() 113 MG/DL


()


 


Calcium Level


  8.2 MG/DL


(8.5-10.1) 8.3 MG/DL


(8.5-10.1)


 


Total Bilirubin


  1.3 MG/DL


(0.2-1.0) 0.6 MG/DL


(0.2-1.0)


 


Direct Bilirubin


  0.3 MG/DL


(0.0-0.3) 


 


 


Aspartate Amino Transf


(AST/SGOT) 65 U/L (15-37) 


  60 U/L (15-37) 


 


 


Alanine Aminotransferase


(ALT/SGPT) 75 U/L (12-78) 


  69 U/L (12-78) 


 


 


Alkaline Phosphatase


  90 U/L


() 91 U/L


()


 


Ammonia


  66 umol/L


(11-32) 64 umol/L


(11-32)


 


Total Protein


  7.0 G/DL


(6.4-8.2) 6.8 G/DL


(6.4-8.2)


 


Albumin


  2.7 G/DL


(3.4-5.0) 2.5 G/DL


(3.4-5.0)


 


Globulin 4.3 g/dL  4.3 g/dL 


 


Albumin/Globulin Ratio 0.6 (1.0-2.7)  0.6 (1.0-2.7) 


 


Phosphorus Level


  


  2.9 MG/DL


(2.5-4.9)


 


Magnesium Level


  


  2.0 MG/DL


(1.8-2.4)








Height (Feet):  5


Height (Inches):  7.00


Weight (Pounds):  126


Objective


Gen: well appearing, nad


Head:  normocephalic, at


HEENT:  hearing grossly normal, eom grossly intact


Respiratory:  lungs clear, normal breath sounds


CV: regular rate, rhythm, no edema


GI:  soft, nd


MSK: moves extremity spontaneously


Neurologic:  grossly normal


Psychiatric: mood/affect normal











Juwan Dawson MD Dec 29, 2019 18:37

## 2019-12-29 NOTE — GENERAL PROGRESS NOTE
Assessment/Plan


Status:  progressing


Assessment/Plan:


cirrhosis


biliary stent


thrombocytopenia


gastric varices


hep C


elevated CEA and CA 19-9





fu hepatitis panel>>> hepatitis C positive


 xifaxan


off norvasc


on propanolol


repeat labs


needs out patient fu for hep c treatment


CT reviewed


 lactulose


 xifaxan





Subjective


Allergies:  


Coded Allergies:  


     No Known Allergies (Unverified , 8/19/18)





Objective





Last 24 Hour Vital Signs








  Date Time  Temp Pulse Resp B/P (MAP) Pulse Ox O2 Delivery O2 Flow Rate FiO2


 


12/29/19 06:19  56  125/75    


 


12/29/19 04:00  60      


 


12/29/19 04:00 97.7 56 20 125/75 (92) 97   


 


12/29/19 00:00 97.9 56 20 131/69 (89) 98   


 


12/29/19 00:00  57      


 


12/28/19 21:04  56  125/65    


 


12/28/19 21:00      Room Air  


 


12/28/19 20:00 97.9 56 18 125/65 (85) 98   


 


12/28/19 20:00  58      


 


12/28/19 16:00 98.1 60 18 108/54 (72) 97   


 


12/28/19 16:00  58      


 


12/28/19 14:00  56  119/73    


 


12/28/19 12:00 98.8 58 18 119/73 (88) 97   


 


12/28/19 12:00  56      


 


12/28/19 09:00      Room Air  


 


12/28/19 08:58    112/66    

















Intake and Output  


 


 12/28/19 12/29/19





 19:00 07:00


 


Intake Total 360 ml 120 ml


 


Balance 360 ml 120 ml


 


  


 


Intake Oral 360 ml 120 ml


 


# Voids 4 1


 


# Bowel Movements  1








Laboratory Tests


12/29/19 06:34: 


White Blood Count 9.5, Red Blood Count 3.18L, Hemoglobin 11.4L, Hematocrit 32.9L

, Mean Corpuscular Volume 104H, Mean Corpuscular Hemoglobin 35.9H, Mean 

Corpuscular Hemoglobin Concent 34.7, Red Cell Distribution Width 13.8, Platelet 

Count 121L, Mean Platelet Volume 6.8, Neutrophils (%) (Auto) 63.7, Lymphocytes (

%) (Auto) 14.9L, Monocytes (%) (Auto) 14.7H, Eosinophils (%) (Auto) 6.1H, 

Basophils (%) (Auto) 0.5, Sodium Level 141, Potassium Level 3.5, Chloride Level 

112H, Carbon Dioxide Level 20L, Anion Gap 9, Blood Urea Nitrogen 24H, 

Creatinine 0.9, Estimat Glomerular Filtration Rate , Glucose Level 113H, 

Calcium Level 8.3L, Phosphorus Level 2.9, Magnesium Level 2.0, Total Bilirubin 

0.6, Aspartate Amino Transf (AST/SGOT) 60H, Alanine Aminotransferase (ALT/SGPT) 

69, Alkaline Phosphatase 91, Ammonia 64H, Total Protein 6.8, Albumin 2.5L, 

Globulin 4.3, Albumin/Globulin Ratio 0.6L


Height (Feet):  5


Height (Inches):  7.00


Weight (Pounds):  126


General Appearance:  no apparent distress


EENT:  normal ENT inspection


Neck:  supple


Cardiovascular:  normal rate


Respiratory/Chest:  decreased breath sounds


Abdomen:  normal bowel sounds, non tender, soft


Extremities:  non-tender











Neal De La Cruz MD Dec 29, 2019 08:39

## 2019-12-29 NOTE — CARDIAC ELECTROPHYSIOLOGY PN
Assessment/Plan


Assessment/Plan


1. Atypical chest pain.  The patient does not currently have chest pain.


He has already ruled out for myocardial infarction.  His EKG shows right


bundle-branch block and left posterior fascicular block. 


 Dobutamine Cardiolite stress showed no ischemia


  Echo EF 65%. On Inderal for esophageal varices but held for temitope in 50s.


  Stable form cardiac stand point for DC





2. Bifascicular block with right bundle-branch block and left posterior


fascicular block.  The patient denies any history of syncope.





3. Hypertension on Inderal 10 q 8 qnd p.r.n. hydralazine.


4. Hepatitis C, cirrhosis, and history of encephalopathy and history of


biliary stent.  Currently on lactulose and Inderal.  Further evaluation by Dr. Martinez.


5. New fever and high WBC on Cefepime per ID





MARTY RN, Dr Thomas 


DC tele.





Subjective


Subjective


Ruled out for MI. No PE.RBBB, LPFB. 


Dobutamine stress test was negative


Echo EF 65%. On iv ABX for fever





Objective





Last 24 Hour Vital Signs








  Date Time  Temp Pulse Resp B/P (MAP) Pulse Ox O2 Delivery O2 Flow Rate FiO2


 


12/29/19 13:02  50  121/71    


 


12/29/19 12:00  53      


 


12/29/19 12:00 97.9 50 20 121/71 (88) 98   


 


12/29/19 09:00      Room Air  


 


12/29/19 08:44    113/64    


 


12/29/19 08:00 98.1 52 18 113/64 (80) 97   


 


12/29/19 08:00  48      


 


12/29/19 06:19  56  125/75    


 


12/29/19 04:00  60      


 


12/29/19 04:00 97.7 56 20 125/75 (92) 97   


 


12/29/19 00:00 97.9 56 20 131/69 (89) 98   


 


12/29/19 00:00  57      


 


12/28/19 21:04  56  125/65    


 


12/28/19 21:00      Room Air  


 


12/28/19 20:00 97.9 56 18 125/65 (85) 98   


 


12/28/19 20:00  58      


 


12/28/19 16:00 98.1 60 18 108/54 (72) 97   


 


12/28/19 16:00  58      

















Intake and Output  


 


 12/28/19 12/29/19





 19:00 07:00


 


Intake Total 360 ml 120 ml


 


Balance 360 ml 120 ml


 


  


 


Intake Oral 360 ml 120 ml


 


# Voids 4 1


 


# Bowel Movements  1











Laboratory Tests








Test


  12/29/19


06:34


 


White Blood Count


  9.5 K/UL


(4.8-10.8)


 


Red Blood Count


  3.18 M/UL


(4.70-6.10)  L


 


Hemoglobin


  11.4 G/DL


(14.2-18.0)  L


 


Hematocrit


  32.9 %


(42.0-52.0)  L


 


Mean Corpuscular Volume


  104 FL (80-99)


H


 


Mean Corpuscular Hemoglobin


  35.9 PG


(27.0-31.0)  H


 


Mean Corpuscular Hemoglobin


Concent 34.7 G/DL


(32.0-36.0)


 


Red Cell Distribution Width


  13.8 %


(11.6-14.8)


 


Platelet Count


  121 K/UL


(150-450)  L


 


Mean Platelet Volume


  6.8 FL


(6.5-10.1)


 


Neutrophils (%) (Auto)


  63.7 %


(45.0-75.0)


 


Lymphocytes (%) (Auto)


  14.9 %


(20.0-45.0)  L


 


Monocytes (%) (Auto)


  14.7 %


(1.0-10.0)  H


 


Eosinophils (%) (Auto)


  6.1 %


(0.0-3.0)  H


 


Basophils (%) (Auto)


  0.5 %


(0.0-2.0)


 


Sodium Level


  141 MMOL/L


(136-145)


 


Potassium Level


  3.5 MMOL/L


(3.5-5.1)


 


Chloride Level


  112 MMOL/L


()  H


 


Carbon Dioxide Level


  20 MMOL/L


(21-32)  L


 


Anion Gap


  9 mmol/L


(5-15)


 


Blood Urea Nitrogen


  24 mg/dL


(7-18)  H


 


Creatinine


  0.9 MG/DL


(0.55-1.30)


 


Estimat Glomerular Filtration


Rate  mL/min (>60)  


 


 


Glucose Level


  113 MG/DL


()  H


 


Calcium Level


  8.3 MG/DL


(8.5-10.1)  L


 


Phosphorus Level


  2.9 MG/DL


(2.5-4.9)


 


Magnesium Level


  2.0 MG/DL


(1.8-2.4)


 


Total Bilirubin


  0.6 MG/DL


(0.2-1.0)


 


Aspartate Amino Transf


(AST/SGOT) 60 U/L (15-37)


H


 


Alanine Aminotransferase


(ALT/SGPT) 69 U/L (12-78)


 


 


Alkaline Phosphatase


  91 U/L


()


 


Ammonia


  64 umol/L


(11-32)  H


 


Total Protein


  6.8 G/DL


(6.4-8.2)


 


Albumin


  2.5 G/DL


(3.4-5.0)  L


 


Globulin 4.3 g/dL  


 


Albumin/Globulin Ratio


  0.6 (1.0-2.7)


L











Microbiology








 Date/Time


Source Procedure


Growth Status


 


 


 12/27/19 16:00


Blood Blood Culture - Preliminary


NO GROWTH AFTER 24 HOURS Resulted








Objective


HEAD AND NECK:  No JVD.


LUNGS:  Clear.


CARDIOVASCULAR:  Regular S1 and S2 with no gallop or murmur. 


ABDOMEN:  Soft and nontender.


EXTREMITIES:  No pitting edema.











Levi Otero MD Dec 29, 2019 14:09

## 2019-12-29 NOTE — GENERAL PROGRESS NOTE
Assessment/Plan


Problem List:  


(1) HTN (hypertension)


ICD Codes:  I10 - Essential (primary) hypertension


SNOMED:  20143261


(2) Chest pain


ICD Codes:  R07.9 - Chest pain, unspecified


SNOMED:  73965008


Status:  progressing


Assessment/Plan:


sepsis


await culture results


id consulted


also k dropped(is replaced





Subjective


ROS Limited/Unobtainable:  Yes


Allergies:  


Coded Allergies:  


     No Known Allergies (Unverified , 8/19/18)





Objective





Last 24 Hour Vital Signs








  Date Time  Temp Pulse Resp B/P (MAP) Pulse Ox O2 Delivery O2 Flow Rate FiO2


 


12/29/19 16:00  53      


 


12/29/19 16:00 97.5 54 18 117/69 (85) 95   


 


12/29/19 13:02  50  121/71    


 


12/29/19 12:00  53      


 


12/29/19 12:00 97.9 50 20 121/71 (88) 98   


 


12/29/19 09:00      Room Air  


 


12/29/19 08:44    113/64    


 


12/29/19 08:00 98.1 52 18 113/64 (80) 97   


 


12/29/19 08:00  48      


 


12/29/19 06:19  56  125/75    


 


12/29/19 04:00  60      


 


12/29/19 04:00 97.7 56 20 125/75 (92) 97   


 


12/29/19 00:00 97.9 56 20 131/69 (89) 98   


 


12/29/19 00:00  57      


 


12/28/19 21:04  56  125/65    


 


12/28/19 21:00      Room Air  


 


12/28/19 20:00 97.9 56 18 125/65 (85) 98   


 


12/28/19 20:00  58      

















Intake and Output  


 


 12/28/19 12/29/19





 19:00 07:00


 


Intake Total 360 ml 120 ml


 


Balance 360 ml 120 ml


 


  


 


Intake Oral 360 ml 120 ml


 


# Voids 4 1


 


# Bowel Movements  1








Laboratory Tests


12/29/19 06:34: 


White Blood Count 9.5, Red Blood Count 3.18L, Hemoglobin 11.4L, Hematocrit 32.9L

, Mean Corpuscular Volume 104H, Mean Corpuscular Hemoglobin 35.9H, Mean 

Corpuscular Hemoglobin Concent 34.7, Red Cell Distribution Width 13.8, Platelet 

Count 121L, Mean Platelet Volume 6.8, Neutrophils (%) (Auto) 63.7, Lymphocytes (

%) (Auto) 14.9L, Monocytes (%) (Auto) 14.7H, Eosinophils (%) (Auto) 6.1H, 

Basophils (%) (Auto) 0.5, Sodium Level 141, Potassium Level 3.5, Chloride Level 

112H, Carbon Dioxide Level 20L, Anion Gap 9, Blood Urea Nitrogen 24H, 

Creatinine 0.9, Estimat Glomerular Filtration Rate , Glucose Level 113H, 

Calcium Level 8.3L, Phosphorus Level 2.9, Magnesium Level 2.0, Total Bilirubin 

0.6, Aspartate Amino Transf (AST/SGOT) 60H, Alanine Aminotransferase (ALT/SGPT) 

69, Alkaline Phosphatase 91, Ammonia 64H, Total Protein 6.8, Albumin 2.5L, 

Globulin 4.3, Albumin/Globulin Ratio 0.6L


Height (Feet):  5


Height (Inches):  7.00


Weight (Pounds):  126


Cardiovascular:  normal rate


Respiratory/Chest:  lungs clear


Abdomen:  non tender











Martin Payan MD Dec 29, 2019 20:01

## 2019-12-30 NOTE — NUR
TRANSFER TO FLOOR:

Patient transferred to [address] [place] [address] via hospital bed accompanied by 2 staff member without any 
incident. Patient taken off tele box and tolerating well. No signs of acute distress noted. 
Report given to SVETLANA Badillo. Belongings list checked by receiving RN. 

-------------------------------------------------------------------------------

Addendum: 12/30/19 at 0248 by Shyanne Erickson RN

-------------------------------------------------------------------------------

Family member, Mary Ann Barboza (brother) informed of transfer.

## 2019-12-30 NOTE — HEMATOLOGY/ONC PROGRESS NOTE
Assessment/Plan


Assessment/Plan


Assessment and Recs:


# Thrombocytopenia with liver cirrhosis - with hepatitis C++, with gastric 

varcies and hepatic encephalopathy


--> hepatitis C is positive and platelets are low remain decreased


--> liver c/w cirrhosis, gastric varices


--> cea 11, ca 19.9 43


# Elevated tumor markers with cea and ca19.9 elevated


--> needs further eval as outpatient in re to cancer screening


--> ct a/p reviewed and shows no mass or malginancy


# Chest pain r/o acs


--> as per Dr. Otero


# Biliary stent


# Hypertension


# Liver cirrhosis


# Hep C


# Spinal stenosis


# Left rotator cuff tear


# Arthropathy





The timing of this note does not necessarily reflect the time of the patient 

was seen.





Greatly appreciate consultation.





Subjective


Allergies:  


Coded Allergies:  


     No Known Allergies (Unverified , 8/19/18)


Subjective


12/28: no overnight events, on cefepime, repeat cbc tomorrow am 


12/29: wbc higher, abx changed to cefepime as per geo eaton


12/30: cefepime now off as per id, no major changes, seen by id





Objective


Objective





Current Medications








 Medications


  (Trade)  Dose


 Ordered  Sig/Kadeem


 Route


 PRN Reason  Start Time


 Stop Time Status Last Admin


Dose Admin


 


 Acetaminophen


  (Tylenol)  650 mg  Q6H  PRN


 ORAL


 Mild Pain/Temp > 100.5  12/30/19 08:00


 1/24/20 13:59   


 


 


 Atorvastatin


 Calcium


  (Lipitor)  40 mg  BEDTIME


 ORAL


   12/30/19 21:00


 1/25/20 20:59   


 


 


 Barium Sulfate


  (Readi-Cat 2)  450 ml  NOW  PRN


 ORAL


 Radiology Procedure  12/30/19 08:15


 1/29/20 08:14   


 


 


 Diphenhydramine


 HCl


  (Benadryl)  25 mg  Q4H  PRN


 ORAL


 Itching  12/30/19 03:45


 1/28/20 11:44   


 


 


 Escitalopram


 Oxalate


  (Lexapro)  10 mg  DAILY


 ORAL


   12/30/19 09:00


 1/26/20 08:59  12/30/19 09:28


 


 


 Hydralazine HCl


  (Apresoline)  25 mg  Q6HR  PRN


 ORAL


 For High Blood Pressure  12/30/19 06:00


 1/23/20 23:14   


 


 


 Iohexol


  (OMNIPAQUE-300


 100ml)  100 ml  NOW  PRN


 INJ


 Radiology Procedure  12/30/19 08:15


 1/29/20 08:14   


 


 


 Lactulose


  (Cephulac)  15 gm  THREE TIMES A  DAY


 ORAL


   12/30/19 09:00


 1/24/20 09:29  12/30/19 15:07


 


 


 Lorazepam


  (Ativan)  2 mg  Q6H  PRN


 ORAL


 For Seizures  12/30/19 03:30


 1/2/20 15:25   


 


 


 Losartan Potassium


  (Cozaar)  50 mg  DAILY


 ORAL


   12/30/19 09:00


 1/26/20 08:59   


 


 


 Nitroglycerin


  (Ntg)  0.4 mg  Q5M  PRN


 SL


 Prn Chest Pain  12/30/19 02:20


 1/24/20 14:14   


 


 


 Propranolol HCl


  (Inderal)  10 mg  Q8HR


 ORAL


   12/30/19 06:00


 1/25/20 13:59   


 


 


 Rifaximin


  (Xifaxan)  550 mg  EVERY 12  HOURS


 ORAL


   12/30/19 09:00


 1/2/20 20:59  12/30/19 09:28


 











Last 24 Hour Vital Signs








  Date Time  Temp Pulse Resp B/P (MAP) Pulse Ox O2 Delivery O2 Flow Rate FiO2


 


12/30/19 16:00 99.2 52 17 123/66 (85) 97   


 


12/30/19 14:00  59  112/76    


 


12/30/19 12:00 98.1 59 18 112/76 (88) 98   


 


12/30/19 09:00      Room Air  


 


12/30/19 09:00    101/51    


 


12/30/19 08:00 97.8 58 17 101/51 (68) 95   


 


12/30/19 05:31  50  127/61    


 


12/30/19 04:00 97.6 50 16 115/61 (79) 97   


 


12/30/19 00:00  53      


 


12/30/19 00:00 97.2 60 20 124/70 (88) 98   


 


12/29/19 21:22  52  121/65    


 


12/29/19 21:00      Room Air  


 


12/29/19 20:00 97.3 52 18 121/65 (83) 97   


 


12/29/19 20:00  53      


 


12/29/19 16:00  53      


 


12/29/19 16:00 97.5 54 18 117/69 (85) 95   


 


12/29/19 13:02  50  121/71    


 


12/29/19 12:00  53      


 


12/29/19 12:00 97.9 50 20 121/71 (88) 98   


 


12/29/19 09:00      Room Air  


 


12/29/19 08:44    113/64    


 


12/29/19 08:00 98.1 52 18 113/64 (80) 97   


 


12/29/19 08:00  48      


 


12/29/19 06:19  56  125/75    


 


12/29/19 04:00  60      


 


12/29/19 04:00 97.7 56 20 125/75 (92) 97   


 


12/29/19 00:00 97.9 56 20 131/69 (89) 98   


 


12/29/19 00:00  57      


 


12/28/19 21:04  56  125/65    


 


12/28/19 21:00      Room Air  


 


12/28/19 20:00 97.9 56 18 125/65 (85) 98   


 


12/28/19 20:00  58      

















Intake and Output  


 


 12/29/19 12/30/19





 19:00 07:00


 


Intake Total 600 ml 120 ml


 


Balance 600 ml 120 ml


 


  


 


Intake Oral 600 ml 120 ml


 


# Voids 3 2











Labs








Test


  12/29/19


06:34 12/29/19


23:30


 


White Blood Count


  9.5 K/UL


(4.8-10.8) 


 


 


Red Blood Count


  3.18 M/UL


(4.70-6.10) 


 


 


Hemoglobin


  11.4 G/DL


(14.2-18.0) 


 


 


Hematocrit


  32.9 %


(42.0-52.0) 


 


 


Mean Corpuscular Volume 104 FL (80-99)  


 


Mean Corpuscular Hemoglobin


  35.9 PG


(27.0-31.0) 


 


 


Mean Corpuscular Hemoglobin


Concent 34.7 G/DL


(32.0-36.0) 


 


 


Red Cell Distribution Width


  13.8 %


(11.6-14.8) 


 


 


Platelet Count


  121 K/UL


(150-450) 


 


 


Mean Platelet Volume


  6.8 FL


(6.5-10.1) 


 


 


Neutrophils (%) (Auto)


  63.7 %


(45.0-75.0) 


 


 


Lymphocytes (%) (Auto)


  14.9 %


(20.0-45.0) 


 


 


Monocytes (%) (Auto)


  14.7 %


(1.0-10.0) 


 


 


Eosinophils (%) (Auto)


  6.1 %


(0.0-3.0) 


 


 


Basophils (%) (Auto)


  0.5 %


(0.0-2.0) 


 


 


Sodium Level


  141 MMOL/L


(136-145) 


 


 


Potassium Level


  3.5 MMOL/L


(3.5-5.1) 


 


 


Chloride Level


  112 MMOL/L


() 


 


 


Carbon Dioxide Level


  20 MMOL/L


(21-32) 


 


 


Anion Gap


  9 mmol/L


(5-15) 


 


 


Blood Urea Nitrogen


  24 mg/dL


(7-18) 


 


 


Creatinine


  0.9 MG/DL


(0.55-1.30) 


 


 


Estimat Glomerular Filtration


Rate  mL/min (>60) 


  


 


 


Glucose Level


  113 MG/DL


() 


 


 


Calcium Level


  8.3 MG/DL


(8.5-10.1) 


 


 


Phosphorus Level


  2.9 MG/DL


(2.5-4.9) 


 


 


Magnesium Level


  2.0 MG/DL


(1.8-2.4) 


 


 


Total Bilirubin


  0.6 MG/DL


(0.2-1.0) 


 


 


Aspartate Amino Transf


(AST/SGOT) 60 U/L (15-37) 


  


 


 


Alanine Aminotransferase


(ALT/SGPT) 69 U/L (12-78) 


  


 


 


Alkaline Phosphatase


  91 U/L


() 


 


 


Ammonia


  64 umol/L


(11-32) 


 


 


Total Protein


  6.8 G/DL


(6.4-8.2) 


 


 


Albumin


  2.5 G/DL


(3.4-5.0) 


 


 


Globulin 4.3 g/dL  


 


Albumin/Globulin Ratio 0.6 (1.0-2.7)  


 


Urine Color  Yellow 


 


Urine Appearance  Clear 


 


Urine pH  6 (4.5-8.0) 


 


Urine Specific Gravity


  


  1.020


(1.005-1.035)


 


Urine Protein  2+ (NEGATIVE) 


 


Urine Glucose (UA)


  


  Negative


(NEGATIVE)


 


Urine Ketones  1+ (NEGATIVE) 


 


Urine Blood


  


  Negative


(NEGATIVE)


 


Urine Nitrite


  


  Negative


(NEGATIVE)


 


Urine Bilirubin


  


  Negative


(NEGATIVE)


 


Urine Urobilinogen


  


  Normal MG/DL


(0.0-1.0)


 


Urine Leukocyte Esterase


  


  Negative


(NEGATIVE)


 


Urine RBC


  


  0-2 /HPF (0 -


0)


 


Urine WBC  0 /HPF (0 - 0) 


 


Urine Squamous Epithelial


Cells 


  None /LPF


(NONE/OCC)


 


Urine Bacteria


  


  Few /HPF


(NONE)








Height (Feet):  5


Height (Inches):  7.00


Weight (Pounds):  126


Objective


Gen: well appearing, nad


Head:  normocephalic, at


HEENT:  hearing grossly normal, eom grossly intact


Respiratory:  lungs clear, normal breath sounds


CV: regular rate, rhythm, no edema


GI:  soft, nd


MSK: moves extremity spontaneously


Neurologic:  grossly normal


Psychiatric: mood/affect normal











Juwan Dawson MD Dec 30, 2019 16:37

## 2019-12-30 NOTE — INFECTIOUS DISEASES PROG NOTE
Assessment/Plan


Assessment/Plan


A;


Fever & leukocytosis resolved


Cirrhosis


Hepatitis C


CBD stent


Bifascicular heart block


BPH


Gastric varices


Seborrheic dermatitis


P;


Discontinue Cefepime





Subjective


ROS Limited/Unobtainable:  No


Respiratory:  Reports: no symptoms


Cardiovascular:  Reports: no symptoms


Gastrointestinal/Abdominal:  Reports: no symptoms


Musculoskeletal:  Reports: other - pain in left knee


Allergies:  


Coded Allergies:  


     No Known Allergies (Unverified , 8/19/18)





Objective


Vital Signs





Last 24 Hour Vital Signs








  Date Time  Temp Pulse Resp B/P (MAP) Pulse Ox O2 Delivery O2 Flow Rate FiO2


 


12/30/19 09:00      Room Air  


 


12/30/19 09:00    101/51    


 


12/30/19 08:00 97.8 58 17 101/51 (68) 95   


 


12/30/19 05:31  50  127/61    


 


12/30/19 04:00 97.6 50 16 115/61 (79) 97   


 


12/30/19 00:00  53      


 


12/30/19 00:00 97.2 60 20 124/70 (88) 98   


 


12/29/19 21:22  52  121/65    


 


12/29/19 21:00      Room Air  


 


12/29/19 20:00 97.3 52 18 121/65 (83) 97   


 


12/29/19 20:00  53      


 


12/29/19 16:00  53      


 


12/29/19 16:00 97.5 54 18 117/69 (85) 95   


 


12/29/19 13:02  50  121/71    








Height (Feet):  5


Height (Inches):  7.00


Weight (Pounds):  126


General Appearance:  no acute distress


HEENT:  mucous membranes moist


Respiratory/Chest:  lungs clear


Cardiovascular:  normal rate


Abdomen:  soft, non tender


Extremities:  no edema


Skin:  other - scabs in left knee & lower leg, mildly 


Neurologic/Psychiatric:  alert, oriented x 3, responsive





Microbiology








 Date/Time


Source Procedure


Growth Status


 


 


 12/27/19 16:00


Blood Blood Culture - Preliminary


NO GROWTH AFTER 48 HOURS Resulted











Laboratory Tests








Test


  12/29/19


23:30


 


Urine Color Yellow  


 


Urine Appearance Clear  


 


Urine pH 6 (4.5-8.0)  


 


Urine Specific Gravity


  1.020


(1.005-1.035)


 


Urine Protein


  2+ (NEGATIVE)


H


 


Urine Glucose (UA)


  Negative


(NEGATIVE)


 


Urine Ketones


  1+ (NEGATIVE)


H


 


Urine Blood


  Negative


(NEGATIVE)


 


Urine Nitrite


  Negative


(NEGATIVE)


 


Urine Bilirubin


  Negative


(NEGATIVE)


 


Urine Urobilinogen


  Normal MG/DL


(0.0-1.0)


 


Urine Leukocyte Esterase


  Negative


(NEGATIVE)


 


Urine RBC


  0-2 /HPF (0 -


0)  H


 


Urine WBC


  0 /HPF (0 - 0)


 


 


Urine Squamous Epithelial


Cells None /LPF


(NONE/OCC)


 


Urine Bacteria


  Few /HPF


(NONE)











Current Medications








 Medications


  (Trade)  Dose


 Ordered  Sig/Kadeem


 Route


 PRN Reason  Start Time


 Stop Time Status Last Admin


Dose Admin


 


 Acetaminophen


  (Tylenol)  650 mg  Q6H  PRN


 ORAL


 Mild Pain/Temp > 100.5  12/30/19 08:00


 1/24/20 13:59   


 


 


 Atorvastatin


 Calcium


  (Lipitor)  40 mg  BEDTIME


 ORAL


   12/30/19 21:00


 1/25/20 20:59   


 


 


 Barium Sulfate


  (Readi-Cat 2)  450 ml  NOW  PRN


 ORAL


 Radiology Procedure  12/30/19 08:15


 1/29/20 08:14   


 


 


 Cefepime HCl 2 gm/


 Dextrose  55 ml @ 


 110 mls/hr  EVERY 12  HOURS


 IVPB


   12/30/19 09:00


 1/3/20 15:29  12/30/19 09:32


 


 


 Diphenhydramine


 HCl


  (Benadryl)  25 mg  Q4H  PRN


 ORAL


 Itching  12/30/19 03:45


 1/28/20 11:44   


 


 


 Escitalopram


 Oxalate


  (Lexapro)  10 mg  DAILY


 ORAL


   12/30/19 09:00


 1/26/20 08:59  12/30/19 09:28


 


 


 Hydralazine HCl


  (Apresoline)  25 mg  Q6HR  PRN


 ORAL


 For High Blood Pressure  12/30/19 06:00


 1/23/20 23:14   


 


 


 Iohexol


  (OMNIPAQUE-300


 100ml)  100 ml  NOW  PRN


 INJ


 Radiology Procedure  12/30/19 08:15


 1/29/20 08:14   


 


 


 Lactulose


  (Cephulac)  15 gm  THREE TIMES A  DAY


 ORAL


   12/30/19 09:00


 1/24/20 09:29  12/30/19 09:27


 


 


 Lorazepam


  (Ativan)  2 mg  Q6H  PRN


 ORAL


 For Seizures  12/30/19 03:30


 1/2/20 15:25   


 


 


 Losartan Potassium


  (Cozaar)  50 mg  DAILY


 ORAL


   12/30/19 09:00


 1/26/20 08:59   


 


 


 Nitroglycerin


  (Ntg)  0.4 mg  Q5M  PRN


 SL


 Prn Chest Pain  12/30/19 02:20


 1/24/20 14:14   


 


 


 Propranolol HCl


  (Inderal)  10 mg  Q8HR


 ORAL


   12/30/19 06:00


 1/25/20 13:59   


 


 


 Rifaximin


  (Xifaxan)  550 mg  EVERY 12  HOURS


 ORAL


   12/30/19 09:00


 1/2/20 20:59  12/30/19 09:28


 

















Jeremy Martin MD Dec 30, 2019 12:43

## 2019-12-30 NOTE — GENERAL PROGRESS NOTE
Assessment/Plan


Status:  progressing


Assessment/Plan:


cirrhosis


biliary stent


thrombocytopenia


gastric varices


hep C


elevated CEA and CA 19-9





fu hepatitis panel>>> hepatitis C positive


 xifaxan


off norvasc


on propanolol


repeat labs


needs out patient fu for hep c treatment


CT reviewed


 lactulose


 xifaxan





Subjective


ROS Limited/Unobtainable:  Yes


Allergies:  


Coded Allergies:  


     No Known Allergies (Unverified , 8/19/18)





Objective





Last 24 Hour Vital Signs








  Date Time  Temp Pulse Resp B/P (MAP) Pulse Ox O2 Delivery O2 Flow Rate FiO2


 


12/30/19 05:31  50  127/61    


 


12/30/19 04:00 97.6 50 16 115/61 (79) 97   


 


12/30/19 00:00  53      


 


12/30/19 00:00 97.2 60 20 124/70 (88) 98   


 


12/29/19 21:22  52  121/65    


 


12/29/19 21:00      Room Air  


 


12/29/19 20:00 97.3 52 18 121/65 (83) 97   


 


12/29/19 20:00  53      


 


12/29/19 16:00  53      


 


12/29/19 16:00 97.5 54 18 117/69 (85) 95   


 


12/29/19 13:02  50  121/71    


 


12/29/19 12:00  53      


 


12/29/19 12:00 97.9 50 20 121/71 (88) 98   

















Intake and Output  


 


 12/29/19 12/30/19





 19:00 07:00


 


Intake Total 600 ml 120 ml


 


Balance 600 ml 120 ml


 


  


 


Intake Oral 600 ml 120 ml


 


# Voids 3 2








Laboratory Tests


12/29/19 23:30: 


Urine Color Yellow, Urine Appearance Clear, Urine pH 6, Urine Specific Gravity 

1.020, Urine Protein 2+H, Urine Glucose (UA) Negative, Urine Ketones 1+H, Urine 

Blood Negative, Urine Nitrite Negative, Urine Bilirubin Negative, Urine 

Urobilinogen Normal, Urine Leukocyte Esterase Negative, Urine RBC 0-2H, Urine 

WBC 0, Urine Squamous Epithelial Cells None, Urine Bacteria Few


Height (Feet):  5


Height (Inches):  7.00


Weight (Pounds):  126


General Appearance:  alert


EENT:  normal ENT inspection


Neck:  supple


Cardiovascular:  normal rate


Respiratory/Chest:  decreased breath sounds


Abdomen:  normal bowel sounds, non tender, soft


Extremities:  non-tender











Neal De La Cruz MD Dec 30, 2019 09:25

## 2019-12-30 NOTE — NUR
CASE MANAGEMENT: REVIEW



12/30/19 



SI: CHEST PAIN 

99.2   52   17   123/66   97 % ON RA





IS: IV CEFEPIME X1

IV LACTLOSE PO TID

LEXAPRO PO QD

RIFAXIMIN PO BID





\**: 4E MED SURG



DCP: RETURN

## 2019-12-30 NOTE — NUR
NURSE NOTES:

Report received from Minsu RN. Patient awake and alert x 3. Setswana speaking only. Patient 
currently sitting up right in bed consuming breakfast. Patient does not have any complaints 
at this time. Optifoam noted on right wrist due to skin opening, intact and in place. Bed 
locked and in lowest position. Call light within reach.Will continue to follow plan of care.

## 2019-12-30 NOTE — GENERAL PROGRESS NOTE
Assessment/Plan


Problem List:  


(1) HTN (hypertension)


ICD Codes:  I10 - Essential (primary) hypertension


SNOMED:  44639578


(2) Chest pain


ICD Codes:  R07.9 - Chest pain, unspecified


SNOMED:  10285207


Status:  progressing


Assessment/Plan:


sepsis


dc in am 


lytes improved 


afebrile





Subjective


ROS Limited/Unobtainable:  Yes


Allergies:  


Coded Allergies:  


     No Known Allergies (Unverified , 8/19/18)





Objective





Last 24 Hour Vital Signs








  Date Time  Temp Pulse Resp B/P (MAP) Pulse Ox O2 Delivery O2 Flow Rate FiO2


 


12/30/19 17:04      Room Air  


 


12/30/19 16:00 99.2 52 17 123/66 (85) 97   


 


12/30/19 14:00  59  112/76    


 


12/30/19 12:00 98.1 59 18 112/76 (88) 98   


 


12/30/19 09:00      Room Air  


 


12/30/19 09:00    101/51    


 


12/30/19 08:00 97.8 58 17 101/51 (68) 95   


 


12/30/19 05:31  50  127/61    


 


12/30/19 04:00 97.6 50 16 115/61 (79) 97   


 


12/30/19 00:00  53      


 


12/30/19 00:00 97.2 60 20 124/70 (88) 98   


 


12/29/19 21:22  52  121/65    


 


12/29/19 21:00      Room Air  

















Intake and Output  


 


 12/29/19 12/30/19





 19:00 07:00


 


Intake Total 600 ml 120 ml


 


Balance 600 ml 120 ml


 


  


 


Intake Oral 600 ml 120 ml


 


# Voids 3 2








Laboratory Tests


12/29/19 23:30: 


Urine Color Yellow, Urine Appearance Clear, Urine pH 6, Urine Specific Gravity 

1.020, Urine Protein 2+H, Urine Glucose (UA) Negative, Urine Ketones 1+H, Urine 

Blood Negative, Urine Nitrite Negative, Urine Bilirubin Negative, Urine 

Urobilinogen Normal, Urine Leukocyte Esterase Negative, Urine RBC 0-2H, Urine 

WBC 0, Urine Squamous Epithelial Cells None, Urine Bacteria Few


12/30/19 19:27: 


White Blood Count 7.5, Red Blood Count 2.87L, Hemoglobin 10.4L, Hematocrit 29.7L

, Mean Corpuscular Volume 103H, Mean Corpuscular Hemoglobin 36.3H, Mean 

Corpuscular Hemoglobin Concent 35.1, Red Cell Distribution Width 13.6, Platelet 

Count 118L, Mean Platelet Volume 6.4L, Neutrophils (%) (Auto) 53.2, Lymphocytes 

(%) (Auto) 22.9, Monocytes (%) (Auto) 14.8H, Eosinophils (%) (Auto) 8.0H, 

Basophils (%) (Auto) 1.1, Sodium Level 147H, Potassium Level 3.5, Chloride 

Level 118H, Carbon Dioxide Level 21, Anion Gap 9, Blood Urea Nitrogen 24H, 

Creatinine 0.9, Estimat Glomerular Filtration Rate , Glucose Level 107H, 

Calcium Level 8.0L, Total Bilirubin [Pending], Aspartate Amino Transf (AST/SGOT

) [Pending], Alanine Aminotransferase (ALT/SGPT) [Pending], Alkaline 

Phosphatase [Pending], Total Protein [Pending], Albumin [Pending], Globulin [

Pending]


Height (Feet):  5


Height (Inches):  7.00


Weight (Pounds):  126


Neck:  supple


Cardiovascular:  normal rate


Respiratory/Chest:  lungs clear











Martin Payan MD Dec 30, 2019 20:29

## 2019-12-30 NOTE — NUR
NURSE NOTES:

Losartan not administered due to blood pressure of 101/51. Will continue to monitor and 
follow plan of care.

## 2019-12-30 NOTE — NUR
NURSE NOTES:

Received report from SVETLANA Lozano and SVETLANA Marquez. Patient awake and verbally responsive in 
Swedish. Breathing unlabored on room air without distress. Denies pain at this time. IV noted 
intact on right forearm. Bed placed at the lowest with alarm, brake, and siderails up for 
safety. Call light placed within reach. Will continue to monitor and provide care as 
ordered.

## 2019-12-30 NOTE — NUR
NURSE NOTES:

Received report from SVETLANA Kimble from Tele. Patient a/a/o x 3, speaks Occitan. Breathing 
unlabored on room air without distress. Iv noted on right forearm intact, dry, clean, and 
patent, wrapped in kerlix for patient safety. Open skin noted on right wrist. Otherwise skin 
intact. Belongings confirmed with patient and transferring RN. Bed placed at the lowest with 
alarm, brake,and siderails up for safety. Call light placed within reach and encourage to 
use. Oriented to the unit and the room. Will continue to monitor and provide care as 
ordered.

## 2019-12-31 NOTE — GENERAL PROGRESS NOTE
Assessment/Plan


Status:  progressing


Assessment/Plan:


cirrhosis


biliary stent


thrombocytopenia


gastric varices


hep C


elevated CEA and CA 19-9





fu hepatitis panel>>> hepatitis C positive


 xifaxan


off norvasc


on propanolol


repeat labs


needs out patient fu for hep c treatment


CT reviewed


 lactulose


 xifaxan





Subjective


ROS Limited/Unobtainable:  Yes


Allergies:  


Coded Allergies:  


     No Known Allergies (Unverified , 8/19/18)





Objective





Last 24 Hour Vital Signs








  Date Time  Temp Pulse Resp B/P (MAP) Pulse Ox O2 Delivery O2 Flow Rate FiO2


 


12/31/19 09:00    118/61    


 


12/31/19 09:00      Room Air  


 


12/31/19 08:00 97.7 58 18 126/62 (83) 98   


 


12/31/19 05:50  55  112/56    


 


12/31/19 04:00 97.0 55 20 102/64 (77) 97   


 


12/31/19 00:00 97.5 57 19 118/57 (77) 97   


 


12/30/19 22:00  52  119/65    


 


12/30/19 21:00      Room Air  


 


12/30/19 20:00 97.2 53 20 115/56 (75) 97   


 


12/30/19 17:04      Room Air  


 


12/30/19 16:00 99.2 52 17 123/66 (85) 97   


 


12/30/19 14:00  59  112/76    


 


12/30/19 12:00 98.1 59 18 112/76 (88) 98   

















Intake and Output  


 


 12/30/19 12/31/19





 19:00 07:00


 


Intake Total 55 ml 1360 ml


 


Balance 55 ml 1360 ml


 


  


 


Intake Oral  360 ml


 


IV Total 55 ml 


 


Other  1000 ml


 


# Voids  2


 


# Bowel Movements 3 3








Laboratory Tests


12/30/19 19:27: 


White Blood Count 7.5, Red Blood Count 2.87L, Hemoglobin 10.4L, Hematocrit 29.7L

, Mean Corpuscular Volume 103H, Mean Corpuscular Hemoglobin 36.3H, Mean 

Corpuscular Hemoglobin Concent 35.1, Red Cell Distribution Width 13.6, Platelet 

Count 118L, Mean Platelet Volume 6.4L, Neutrophils (%) (Auto) 53.2, Lymphocytes 

(%) (Auto) 22.9, Monocytes (%) (Auto) 14.8H, Eosinophils (%) (Auto) 8.0H, 

Basophils (%) (Auto) 1.1, Sodium Level 147H, Potassium Level 3.5, Chloride 

Level 118H, Carbon Dioxide Level 21, Anion Gap 9, Blood Urea Nitrogen 24H, 

Creatinine 0.9, Estimat Glomerular Filtration Rate , Glucose Level 107H, 

Calcium Level 8.0L, Total Bilirubin 0.5, Aspartate Amino Transf (AST/SGOT) 55H, 

Alanine Aminotransferase (ALT/SGPT) 63, Alkaline Phosphatase 77, Total Protein 

6.4, Albumin 2.4L, Globulin 4.0, Albumin/Globulin Ratio 0.6L


Height (Feet):  5


Height (Inches):  7.00


Weight (Pounds):  126


General Appearance:  alert


EENT:  normal ENT inspection


Neck:  normal alignment, supple


Cardiovascular:  normal rate


Respiratory/Chest:  decreased breath sounds


Abdomen:  normal bowel sounds, non tender


Extremities:  non-tender











Neal De La Cruz MD Dec 31, 2019 09:57

## 2019-12-31 NOTE — NUR
***DISCHARGE PLANNED: 



PATIENT IS RETURNING TO 

[facility]

T: [phone] FOR NURSE TO NURSE REPORT 



ROOM# 12B

[facility] 



[facility] PICKUP TIME 10am

SPOKE WITH BROTHER (BRENT) MADE HIM AWARE OF DISCHARGE

## 2019-12-31 NOTE — PROGRESS NOTE
DATE:  12/31/2019

SUBJECTIVE:  The patient is doing well.  No behavior issues noted.  Calm,

cooperative.  He is going to be discharged today.



MENTAL STATUS EXAMINATION:  Alert, oriented x4.  Mood is depressive.

Affect is constricted, congruent with mood.  Thought process is concrete.

Thought content, no suicidal or homicidal ideation.  Cognition is intact.

Insight and judgment is fair.



ASSESSMENT:  Stable.



PLAN:

1. We will continue current medication.

2. Provide the patient with reality orientation and supportive

therapy.









  ______________________________________________

  Deana Duran M.D.





DR:  JAJA

D:  12/31/2019 12:53

T:  12/31/2019 17:34

JOB#:  [id number]

CC:

## 2019-12-31 NOTE — NUR
RE DC   [facility] CALLED,  SCHEDULED FOR 10AM, THERE IS A DISCHARGE ORDER 
BACK TO [facility]

## 2019-12-31 NOTE — HEMATOLOGY/ONC PROGRESS NOTE
Assessment/Plan


Assessment/Plan


Assessment and Recs:


# Thrombocytopenia with liver cirrhosis - with hepatitis C++, with gastric 

varcies and hepatic encephalopathy


--> hepatitis C is positive and platelets are low remain decreased


--> liver c/w cirrhosis, gastric varices


--> cea 11, ca 19.9 43


# Elevated tumor markers with cea and ca19.9 elevated


--> needs further eval as outpatient in re to cancer screening


--> ct a/p reviewed and shows no mass or malginancy


# Chest pain r/o acs


--> as per Dr. Otero


# Biliary stent


# Hypertension


# Liver cirrhosis


# Hep C


# Spinal stenosis


# Left rotator cuff tear


# Arthropathy





The timing of this note does not necessarily reflect the time of the patient 

was seen.





Greatly appreciate consultation.





Subjective


Allergies:  


Coded Allergies:  


     No Known Allergies (Unverified , 8/19/18)


Subjective


12/12/28: no overnight events, on cefepime, repeat cbc tomorrow am 


12/29: wbc higher, abx changed to cefepime as per geo eaton


12/30: cefepime now off as per id, no major changes, seen by id


12/31: no acute events, in stable condition, dc planning





Objective


Objective





Last 24 Hour Vital Signs








  Date Time  Temp Pulse Resp B/P (MAP) Pulse Ox O2 Delivery O2 Flow Rate FiO2


 


12/31/19 09:00    118/61    


 


12/31/19 09:00      Room Air  


 


12/31/19 08:00 97.7 58 18 126/62 (83) 98   


 


12/31/19 05:50  55  112/56    


 


12/31/19 04:00 97.0 55 20 102/64 (77) 97   


 


12/31/19 00:00 97.5 57 19 118/57 (77) 97   


 


12/30/19 22:00  52  119/65    


 


12/30/19 21:00      Room Air  


 


12/30/19 20:00 97.2 53 20 115/56 (75) 97   


 


12/30/19 17:04      Room Air  


 


12/30/19 16:00 99.2 52 17 123/66 (85) 97   


 


12/30/19 14:00  59  112/76    


 


12/30/19 12:00 98.1 59 18 112/76 (88) 98   


 


12/30/19 09:00      Room Air  


 


12/30/19 09:00    101/51    


 


12/30/19 08:00 97.8 58 17 101/51 (68) 95   


 


12/30/19 05:31  50  127/61    


 


12/30/19 04:00 97.6 50 16 115/61 (79) 97   


 


12/30/19 00:00  53      


 


12/30/19 00:00 97.2 60 20 124/70 (88) 98   


 


12/29/19 21:22  52  121/65    


 


12/29/19 21:00      Room Air  


 


12/29/19 20:00 97.3 52 18 121/65 (83) 97   


 


12/29/19 20:00  53      


 


12/29/19 16:00  53      


 


12/29/19 16:00 97.5 54 18 117/69 (85) 95   


 


12/29/19 13:02  50  121/71    

















Intake and Output  


 


 12/30/19 12/31/19





 19:00 07:00


 


Intake Total 55 ml 1360 ml


 


Balance 55 ml 1360 ml


 


  


 


Intake Oral  360 ml


 


IV Total 55 ml 


 


Other  1000 ml


 


# Voids  2


 


# Bowel Movements 3 3











Labs








Test


  12/29/19


06:34 12/29/19


23:30 12/30/19


19:27


 


White Blood Count


  9.5 K/UL


(4.8-10.8) 


  7.5 K/UL


(4.8-10.8)


 


Red Blood Count


  3.18 M/UL


(4.70-6.10) 


  2.87 M/UL


(4.70-6.10)


 


Hemoglobin


  11.4 G/DL


(14.2-18.0) 


  10.4 G/DL


(14.2-18.0)


 


Hematocrit


  32.9 %


(42.0-52.0) 


  29.7 %


(42.0-52.0)


 


Mean Corpuscular Volume 104 FL (80-99)   103 FL (80-99) 


 


Mean Corpuscular Hemoglobin


  35.9 PG


(27.0-31.0) 


  36.3 PG


(27.0-31.0)


 


Mean Corpuscular Hemoglobin


Concent 34.7 G/DL


(32.0-36.0) 


  35.1 G/DL


(32.0-36.0)


 


Red Cell Distribution Width


  13.8 %


(11.6-14.8) 


  13.6 %


(11.6-14.8)


 


Platelet Count


  121 K/UL


(150-450) 


  118 K/UL


(150-450)


 


Mean Platelet Volume


  6.8 FL


(6.5-10.1) 


  6.4 FL


(6.5-10.1)


 


Neutrophils (%) (Auto)


  63.7 %


(45.0-75.0) 


  53.2 %


(45.0-75.0)


 


Lymphocytes (%) (Auto)


  14.9 %


(20.0-45.0) 


  22.9 %


(20.0-45.0)


 


Monocytes (%) (Auto)


  14.7 %


(1.0-10.0) 


  14.8 %


(1.0-10.0)


 


Eosinophils (%) (Auto)


  6.1 %


(0.0-3.0) 


  8.0 %


(0.0-3.0)


 


Basophils (%) (Auto)


  0.5 %


(0.0-2.0) 


  1.1 %


(0.0-2.0)


 


Sodium Level


  141 MMOL/L


(136-145) 


  147 MMOL/L


(136-145)


 


Potassium Level


  3.5 MMOL/L


(3.5-5.1) 


  3.5 MMOL/L


(3.5-5.1)


 


Chloride Level


  112 MMOL/L


() 


  118 MMOL/L


()


 


Carbon Dioxide Level


  20 MMOL/L


(21-32) 


  21 MMOL/L


(21-32)


 


Anion Gap


  9 mmol/L


(5-15) 


  9 mmol/L


(5-15)


 


Blood Urea Nitrogen


  24 mg/dL


(7-18) 


  24 mg/dL


(7-18)


 


Creatinine


  0.9 MG/DL


(0.55-1.30) 


  0.9 MG/DL


(0.55-1.30)


 


Estimat Glomerular Filtration


Rate  mL/min (>60) 


  


   mL/min (>60) 


 


 


Glucose Level


  113 MG/DL


() 


  107 MG/DL


()


 


Calcium Level


  8.3 MG/DL


(8.5-10.1) 


  8.0 MG/DL


(8.5-10.1)


 


Phosphorus Level


  2.9 MG/DL


(2.5-4.9) 


  


 


 


Magnesium Level


  2.0 MG/DL


(1.8-2.4) 


  


 


 


Total Bilirubin


  0.6 MG/DL


(0.2-1.0) 


  0.5 MG/DL


(0.2-1.0)


 


Aspartate Amino Transf


(AST/SGOT) 60 U/L (15-37) 


  


  55 U/L (15-37) 


 


 


Alanine Aminotransferase


(ALT/SGPT) 69 U/L (12-78) 


  


  63 U/L (12-78) 


 


 


Alkaline Phosphatase


  91 U/L


() 


  77 U/L


()


 


Ammonia


  64 umol/L


(11-32) 


  


 


 


Total Protein


  6.8 G/DL


(6.4-8.2) 


  6.4 G/DL


(6.4-8.2)


 


Albumin


  2.5 G/DL


(3.4-5.0) 


  2.4 G/DL


(3.4-5.0)


 


Globulin 4.3 g/dL   4.0 g/dL 


 


Albumin/Globulin Ratio 0.6 (1.0-2.7)   0.6 (1.0-2.7) 


 


Urine Color  Yellow  


 


Urine Appearance  Clear  


 


Urine pH  6 (4.5-8.0)  


 


Urine Specific Gravity


  


  1.020


(1.005-1.035) 


 


 


Urine Protein  2+ (NEGATIVE)  


 


Urine Glucose (UA)


  


  Negative


(NEGATIVE) 


 


 


Urine Ketones  1+ (NEGATIVE)  


 


Urine Blood


  


  Negative


(NEGATIVE) 


 


 


Urine Nitrite


  


  Negative


(NEGATIVE) 


 


 


Urine Bilirubin


  


  Negative


(NEGATIVE) 


 


 


Urine Urobilinogen


  


  Normal MG/DL


(0.0-1.0) 


 


 


Urine Leukocyte Esterase


  


  Negative


(NEGATIVE) 


 


 


Urine RBC


  


  0-2 /HPF (0 -


0) 


 


 


Urine WBC  0 /HPF (0 - 0)  


 


Urine Squamous Epithelial


Cells 


  None /LPF


(NONE/OCC) 


 


 


Urine Bacteria


  


  Few /HPF


(NONE) 


 








Height (Feet):  5


Height (Inches):  7.00


Weight (Pounds):  126


Objective


Gen: well appearing, nad


Head:  normocephalic, at


HEENT:  hearing grossly normal, eom grossly intact


Respiratory:  lungs clear, normal breath sounds


CV: regular rate, rhythm, no edema


GI:  soft, nd


MSK: moves extremity spontaneously


Neurologic:  grossly normal


Psychiatric: mood/affect normal











Juwan Dawson MD Dec 31, 2019 12:52

## 2019-12-31 NOTE — NUR
NURSE NOTES:

Received patient in bed, awake, not in respiratory/cardiac distress. Denies any  pain or 
discomfort. IV is intact, no s/s of infiltration. Bed is in lowest position and locked. Call 
light within reach. Will continue plan of care. Will follow up with discharge.

## 2020-01-02 NOTE — DISCHARGE SUMMARY
Discharge Summary


Discharge Summary


_


DATE OF ADMISSION: 12/24/2019





DATE OF DISCHARGE: 12/31/2019








DISCHARGED BY: Dr. Payan





REASON FOR ADMISSION: 


71 years old male with past medical history of liver cirrhosis, hepatitis C, 

hypertension, BPH, hepatic encephalopathy, spinal stenosis, GERD, presented 

with left-sided sharp chest pain , started 15 minutes prior to arrival.  


Patient received 1 sublingual nitroglycerin at  SNF without help in relieving  

his symptoms.  


Paramedics patient with 325 mg of aspirin and 3 sprays of nitroglycerin without

  significant improvement.


Patient also reported associated shortness of breath.  


Pain reported as  7 out of 10 on a scale 1-10.  





Upon evaluation vital signs were stable.  


Pulse oximetry was stable on room air.  


Laboratory work-up revealed no leukocytosis, hemoglobin 11.4 ,hematocrit 31.7.  

Platelet count 146.


D-dimer 1.26.


Stable electrolytes.  


BUN 32, creatinine 1.2.  


Glucose 95.  


Troponin negative,  pro BNP 80. 


EKG revealed sinus rhythm with right bundle branch block and left  posterior 

fascicular block.  


Chest x-ray revealed no acute cardiopulmonary pathology.  


Patient subsequently admitted for evaluation for acute coronary syndrome.


 


CONSULTANTS:


cardiologist Dr. Allen


ID specialist Dr. Martin


GI specialist Dr. Martinez


hematologist/oncologist Dr. Dawson


psychiatrist 


 


[facility] COURSE: 


Patient admitted to telemetry floor.  


Cardiologist   followed.  


Serial troponin were negative.  


EKG revealed right bundle branch block and left posterior  fascicular block.  


Patient was ruled out for acute myocardial infarction.


Cardiolite stress test revealed no evidence of ischemia.  


Echocardiogram revealed preserved ejection fraction of 65%.  


Patient was noted to have, as mentioned above, bifascicular block with right 

bundle branch block and left posterior fascicular block.  


Patient however denied any history of syncope.  


Blood pressure was managed with Inderal and hydralazine.


CTA of the chest revealed no evidence of pulmonary emboli.  No acute process in 

the lungs.  


Morphological changes of cirrhosis within the liver  with  prominent gastric 

varices noted.  


Per cardiologist,  chest pain was atypical.





GI specialist followed.  


Patient  with  history of cirrhosis and biliary stent placement.  


Hepatitis panel revealed evidence of hepatitis C. 


GI specialist recommended outpatient referral for hepatitis C treatment.  


Ammonia level initially  was 93. 


Patient started  on lactulose and Xifaxan.    


Ammonia trending down. 


Noted elevated CEA   11.6 and CA-19-9  -43. 


LFTs were closely monitored : AST from 73 down to 55,  ALT from 84 down to 63.  

Total bilirubin from 1.3 trended down to normal.  


CT of the abdomen and pelvis revealed nodular cirrhotic appearance of the 

liver.  Varices.  Splenomegaly.  


Common bile duct stent and dilated common bile duct measuring 12 mm.  


No evidence of mass or malignancy.





Hematologist followed.  Thrombocytopenia was consistent with   liver cirrhosis.

  


Platelet count was  closely monitored.  Prior to discharge platelet count 118. 


Per hematologist, patient will need further evaluation as outpatient in regards 

to cancer screening.  


As mentioned above, CT of the abdomen and pelvis of the scan revealed no 

evidence of mass or malignancy.  





Due to elevated CEA and CA-19-9 psychiatrist followed and diagnosed patient 

with  major depressive disorder.  


Patient was prior on Depakote,  which was discontinued , and patient started on 

Lexapro. 


Reality orientation  and supportive therapy provided.





Patient noted to have leukocytosis  and low-grade fever.  


ID specialist followed.


Patient started on empiric antibiotic.


Blood cultures were negative.


Urinalysis revealed no evidence of urinary tract infection.  


Fever and  leukocytosis resolved.  Antibiotics stopped.





Patient clinically stabilized and was ready for transfer back to skilled 

nursing facility for continuation of care.  


Outpatient follow-up for cancer screening was recommended.





FINAL DIAGNOSES: 


Atypical chest pain


Bifascicular block with right bundle branch block and left posterior fascicular 

block


Hypertension


Cirrhosis


Hepatitis C


Encephalopathy


History of biliary stent


Gastric varices


Thrombocytopenia


Elevated tumor markers/CEA, CA-19-9


Major depressive disorder


Fever and leukocytosis -resolved








DISCHARGE MEDICATIONS:


See Medication Reconciliation list.





DISCHARGE INSTRUCTIONS:


Patient was discharged to the skilled nursing facility. 


Follow up with medical doctor at the facility.





I have been assigned to dictate discharge summary for this account. 


I was not involved in the patient's management.











Mariola Pedro NP Jan 2, 2020 09:11

## 2020-01-06 NOTE — CODER PHYSICIAN QUERY
Clarification is required for compliance, coding accuracy, and to reflect 
severity of illness for this patient



Dear                              Date: 01/06/20

/CDS' Name: SUZANNE PEDROZA         



CHEST PAIN QUERY -



REASON FOR ADMISSION: 

71 years old male with past medical history of liver cirrhosis, hepatitis C, 
hypertension, BPH, hepatic encephalopathy, spinal stenosis, GERD, presented 
with left-sided sharp chest pain , started 15 minutes prior to arrival.  

Patient received 1 sublingual nitroglycerin at  SNF without help in relieving  
his symptoms.  

Serial troponin were negative.  

EKG revealed right bundle branch block and left posterior  fascicular block.  

Patient was ruled out for acute myocardial infarction.

Cardiolite stress test revealed no evidence of ischemia.  

Echocardiogram revealed preserved ejection fraction of 65%.  

Patient was noted to have, as mentioned above, bifascicular block with right 
bundle branch block and left posterior fascicular block.  

Patient however denied any history of syncope.  

Blood pressure was managed with Inderal and hydralazine.

CTA of the chest revealed no evidence of pulmonary emboli.  No acute process in 
the lungs.  





Please document the suspected etiology of Chest Pain:

          

[] Acute Coronary Syndrome                   

[] Pericarditis   

[] Anxiety                         

[] Cancer                         

[] Pneumonia

[] Costochondritis             

[] Pneumothorax      

[] GERD/Esophagitis          

[] Pulmonary embolism   

[] Other:___________________

[] Unable to determine



_________________                                    _____________

PRATEEK CHAKRABORTY M.D.                                       Date





Please also document in your Progress Notes and/or Discharge Summary and 
indicate if the condition was present on admission.

EMILIANO

## 2020-01-07 NOTE — CDS PHYSICIAN QUERY
Clarification is required for compliance, coding accuracy, and to reflect 
severity of illness for this patient



Dear Levi Way MD                       Date 01-



/CDS' Name: Herb Fitzgerald        



Assessment/Plan

Assessment/Plan

1. Atypical chest pain.  The patient does not currently have chest pain.

He has already ruled out for myocardial infarction.  His EKG shows right

bundle-branch block and left posterior fascicular block. 

 Dobutamine Cardiolite stress showed no ischemia

  Echo EF 65%. On Inderal for esophageal varices but held for temitope in 50s.

  Stable form cardiac stand point for DC

2. Bifascicular block with right bundle-branch block and left posterior

fascicular block.  The patient denies any history of syncope.

3. Hypertension on Inderal 10 q 8 qnd p.r.n. hydralazine.

4. Hepatitis C, cirrhosis, and history of encephalopathy and history of

biliary stent.  Currently on lactulose and Inderal.  Further evaluation by Dr. Martinez.

5. New fever and high WBC on Cefepime per ID





Please document the suspected etiology of Chest Pain:





[] Aortic dissection                   

[] Acute myocardial infarction              

[] Acute Coronary Syndrome                   

[] Pericarditis   

[] Anxiety                         

[] Cancer                         

[] Pneumonia

[] Costochondritis             

[] Pneumothorax      

[] GERD/Esophagitis          

[] Pulmonary embolism   

[] Other:___________________

[] Unable to determine



Present on Admission:  []  Yes          []  No         []  Clinically 
Undetermined





_________________                                    _____________

Physician signature                                       Date



Please also document in your Progress Notes and/or Discharge Summary and 
indicate if the condition was present on admission.

EMILIANO

## 2020-08-17 NOTE — EMERGENCY ROOM REPORT
History of Present Illness


General


Chief Complaint:  Flu Like Symptoms


Source:  Medical Record, EMS





Present Illness


HPI


Patient is taken via EMS for upper respiratory symptoms.  He has had a fever.  

In the field he was hypotensive.  The patient is unresponsive and unable to 

give history at this time.





The patient was admitted January of this year.  Discharge diagnoses:


Hepatic encephalopathy


Dementia with behavioral disturbances


Hepatitis C


Liver cirrhosis with portal hypertension


Elevated cancer tumor markers CEA, CA-19-9 and alpha-fetoprotein 


Mild thrombocytopenia likely due to liver cirrhosis blood cultures were 

negative.


Status post biliary stent placement  (at outside hospital) 


Altered mental status,  likely at least in part due to hepatic encephalopathy


History of spinal stenosis


Gallstones


Hypertension


Allergies:  


Coded Allergies:  


     No Known Allergies (Unverified , 8/19/18)





COVID-19 Screening


Contact w/high risk pt:  No


Experienced COVID-19 symptoms?:  Yes


COVID-19 Testing performed PTA:  No





Patient History


Limited by:  medical condition


Past Medical History:  see triage record, old chart reviewed


Social History Narrative


SNF [facility]


Reviewed Nursing Documentation:  PMH: Agreed; PSxH: Agreed





Nursing Documentation-PMH


Hx Cardiac Problems:  Yes - CHF, ANGINA PECTORIS


Hx Hypertension:  Yes - ANEMIA


Hx Cancer:  No


Hx Gastrointestinal Problems:  Yes - hep C, DYSPHAGIA


Hx Neurological Problems:  Yes - HEPATIC ENCEPHALOPATHY





Review of Systems


All Other Systems:  limited





Physical Exam





Vital Signs








  Date Time  Temp Pulse Resp B/P (MAP) Pulse Ox O2 Delivery O2 Flow Rate FiO2


 


8/17/20 21:43 100.0 90 18 88/40 (56) 99 Room Air  








Sp02 EP Interpretation:  reviewed, normal


General Appearance:  no apparent distress, lethargic, Chronically Ill


Eyes:  bilateral eye PERRL, bilateral eye Scleral Injection


ENT:  dry mucus membranes


Neck:  supple


Respiratory:  no respiratory distress, crackles, rales, rhonchi


Cardiovascular #1:  regular rate, rhythm, edema - trace bilat


Cardiovascular #2:  2+ radial (R)


Gastrointestinal:  soft, no guarding, decreased bowel sounds


Genitourinary:  normal inspection


Musculoskeletal:  decreased range of motion


Neurologic:  aphasia, other


Psychiatric:  other - stupor


Reflexes:  0 knee (R), 0 knee (L)


Skin:  warm/dry, other - slightly plethoric





Procedures


Critical Care Time


Critical Care Time


Total Critical Care Time: 30 min bedside evaluation and treatment excludes 

procedures (EKG).


Reason for critical care: Sepsis, encephalopathy, non-STEMI


Possible complications: hypotension, hypertension, MI, shock, arrhythmias, 

metabolic acidosis, end organ damage, respiratory failure.


Interventions: Sepsis resuscitation, repeat evaluations, antibiotics, aspirin


Course: Patient presents with us for respiratory symptoms hypotension and 

fever.  Sepsis resuscitation begun.  Positive troponin treated with aspirin.  

Sepsis reevaluation with improved vital signs.  Identification of probably 

urinary source and antibiotics begun.  Clinical status improved however still 

guarded prognosis.


Consultations: nursing staff, EMS, lab, admitting physician


Performed by: Dr. Adame


Tolerated well condition = serious





Medical Decision Making


Diagnostic Impression:  


 Primary Impression:  


 Sepsis


 Qualified Codes:  A41.9 - Sepsis, unspecified organism; R65.20 - Severe sepsis 

without septic shock; G93.40 - Encephalopathy, unspecified


 Additional Impressions:  


 UTI (urinary tract infection)


 Qualified Codes:  N39.0 - Urinary tract infection, site not specified


 Dehydration


 Renal failure


 Qualified Codes:  N17.9 - Acute kidney failure, unspecified


 Encephalopathy


ER Course


Patient presents with upper respiratory symptoms with fever and hypotension.  

Differential includes sepsis, pneumonia, COVID-19, urinary tract infection, 

other source amongst others.  Evaluation with EKG, DVT, chest x-ray and labs.  

Sepsis resuscitation begun with 30 mill per kilogram bolus and continued 

hydration.  Presentation is suggestive of COVID-19.  This combined with coming 

from the skilled nursing facility.  Patient is placed in isolation and on a 

cardiac monitor.  Tylenol administered for fever.





EKG no injury.  Chest x-ray no infiltrates.  White count normal with no left 

shift.  CMP remarkable for renal failure.  Pyuria present.  





Rapid COVID-19 test negative.





Lab called with positive troponin.  Aspirin administered.  Due to hypotension 

and presence of sepsis nitrates and beta-blockers contraindicated.





Sepsis re-eval 2300 start antibiotics.  Patient vital signs improved with fluid 

hydration.  Patient continues to have altered mentation.  Capillary fill good.  

Tachypnea improved.  Antibiotics instituted for probable urinary tract 

infection.





Late review of chart reveals back encephalopathy.  At the time unable to 

administer lactulose or other treatment.  In the face of infection is explained 

some of his unresponsiveness.





Discussion with admitting physician regarding laboratory values and treatment.  

Admit to telemetry.





Laboratory Tests








Test


  8/17/20


21:45 8/17/20


22:00 8/17/20


23:45


 


White Blood Count


  10.6 K/UL


(4.8-10.8) 


  


 


 


Red Blood Count


  3.22 M/UL


(4.70-6.10)  L 


  


 


 


Hemoglobin


  9.7 G/DL


(14.2-18.0)  L 


  


 


 


Hematocrit


  30.7 %


(42.0-52.0)  L 


  


 


 


Mean Corpuscular Volume 95 FL (80-99)    


 


Mean Corpuscular Hemoglobin


  30.1 PG


(27.0-31.0) 


  


 


 


Mean Corpuscular Hemoglobin


Concent 31.5 G/DL


(32.0-36.0)  L 


  


 


 


Red Cell Distribution Width


  15.9 %


(11.6-14.8)  H 


  


 


 


Platelet Count


  102 K/UL


(150-450)  L 


  


 


 


Mean Platelet Volume


  6.9 FL


(6.5-10.1) 


  


 


 


Neutrophils (%) (Auto)


  74.1 %


(45.0-75.0) 


  


 


 


Lymphocytes (%) (Auto)


  14.7 %


(20.0-45.0)  L 


  


 


 


Monocytes (%) (Auto)


  10.1 %


(1.0-10.0)  H 


  


 


 


Eosinophils (%) (Auto)


  0.6 %


(0.0-3.0) 


  


 


 


Basophils (%) (Auto)


  0.6 %


(0.0-2.0) 


  


 


 


Prothrombin Time


  12.3 SEC


(9.30-11.50)  H 


  


 


 


Prothrombin Time INR 1.1 (0.9-1.1)    


 


Activated Partial


Thromboplast Time 22 SEC (23-33)


L 


  


 


 


Lactic Acid Level


  2.50 mmol/L


(0.4-2.0)  H 


  2.50 mmol/L


(0.66-2.22)  H


 


Urine Color  Yellow   


 


Urine Appearance


  


  Slightly


cloudy 


 


 


Urine pH  6.0 (4.5-8.0)   


 


Urine Specific Gravity


  


  1.015


(1.005-1.035) 


 


 


Urine Protein


  


  2+ (NEGATIVE)


H 


 


 


Urine Glucose (UA)


  


  Negative


(NEGATIVE) 


 


 


Urine Ketones


  


  Negative


(NEGATIVE) 


 


 


Urine Blood


  


  Negative


(NEGATIVE) 


 


 


Urine Nitrite


  


  Negative


(NEGATIVE) 


 


 


Urine Bilirubin


  


  Negative


(NEGATIVE) 


 


 


Urine Urobilinogen


  


  Normal MG/DL


(0.0-1.0) 


 


 


Urine Leukocyte Esterase


  


  Negative


(NEGATIVE) 


 


 


Urine RBC


  


  0-2 /HPF (0 -


0)  H 


 


 


Urine WBC


  


  5-10 /HPF (0 -


0)  H 


 


 


Urine Squamous Epithelial


Cells 


  Few /LPF


(NONE/OCC) 


 


 


Urine Bacteria


  


  Few /HPF


(NONE) 


 


 


Urine Mucus


  


  Many /LPF


(NONE/OCC)  H 


 


 


Sodium Level


  


  153 MMOL/L


(136-145)  H 


 


 


Potassium Level


  


  3.9 MMOL/L


(3.5-5.1) 


 


 


Chloride Level


  


  118 MMOL/L


()  H 


 


 


Carbon Dioxide Level


  


  20 MMOL/L


(21-32)  L 


 


 


Anion Gap


  


  15 mmol/L


(5-15) 


 


 


Blood Urea Nitrogen


  


  48 mg/dL


(7-18)  H 


 


 


Creatinine


  


  1.8 MG/DL


(0.55-1.30)  H 


 


 


Estimated Glomerular


Filtration Rate 


  37.3 mL/min


(>60) 


 


 


Glucose Level


  


  138 MG/DL


()  H 


 


 


Calcium Level


  


  8.4 MG/DL


(8.5-10.1)  L 


 


 


Magnesium Level


  


  2.2 MG/DL


(1.8-2.4) 


 


 


Total Bilirubin


  


  0.5 MG/DL


(0.2-1.0) 


 


 


Aspartate Amino Transferase


(AST) 


  53 U/L (15-37)


H 


 


 


Alanine Aminotransferase (ALT)


  


  33 U/L (12-78)


  


 


 


Alkaline Phosphatase


  


  98 U/L


() 


 


 


Total Creatine Kinase


  


  289 U/L


() 


 


 


Troponin I


  


  0.075 ng/mL


(0.000-0.056) 


 


 


Pro-B-Type Natriuretic Peptide


  


  171 pg/mL


(0-125)  H 


 


 


Total Protein


  


  7.4 G/DL


(6.4-8.2) 


 


 


Albumin


  


  2.7 G/DL


(3.4-5.0)  L 


 


 


Globulin  4.7 g/dL   


 


Albumin/Globulin Ratio


  


  0.6 (1.0-2.7)


L 


 


 


Lipase


  


  340 U/L


() 


 








Microbiology








 Date/Time


Source Procedure


Growth Status


 


 


 8/17/20 22:30


Nasopharynx SARS-CoV-2 RdRp Gene Assay - Final Complete








Rhythm Strip Diag. Results


EP Interpretation:  yes


Rhythm:  NSR, no PVC's, no ectopy





Chest X-Ray Diagnostic Results


Chest X-Ray Diagnostic Results :  


   Chest X-Ray Ordered:  Yes


   # of Views/Limited/Complete:  1 View


   Indication:  Shortness of Breath


   EP Interpretation:  Yes


   Interpretation:  no consolidation, no effusion, no pneumothorax


   Impression:  No acute disease


   Electronically Signed by:  Electronically signed by Josue Adame MD





Last Vital Signs








  Date Time  Temp Pulse Resp B/P (MAP) Pulse Ox O2 Delivery O2 Flow Rate FiO2


 


8/17/20 22:00  90 18   Room Air  


 


8/17/20 22:00 100.0   88/40 99   








Status:  improved


Disposition:  ADMITTED AS INPATIENT


Condition:  Serious


Referrals:  


Martin Payan MD (PCP)











Josue Adame MD Aug 17, 2020 23:01

## 2020-08-18 NOTE — DIAGNOSTIC IMAGING REPORT
Indication: Shortness of breath

 

Technique: One view of the chest

 

Comparison: none

 

Findings: Lungs and pleural spaces are clear. Heart size is normal. Aorta is tortuous

and calcified. Surgical hardware is seen in the cervical spine no significant interim

change

 

Impression: No acute process
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
## 2020-08-18 NOTE — CONSULTATION
Consult Note


Consult Note


I am asked to evaluate the patient at the request of Dr. Wong for renal 

failure and electrolyte imbalances


Patient was seen in emergency room.  He has been in the ER waiting for a room 

since yesterday.


Patient examined.  Labs reviewed.  Discussed with SVETLANA Millard.


Patient is an elderly [place] male referred to emergency room with flulike 

symptoms


Patient is a nursing home resident


Past history:


Coronary artery disease, congestive heart failure, hypertension, anemia, 

history of hepatitis C, dysphagia, history of hepatic encephalopathy





COVID-19 Screening


Contact w/high risk pt:  No


Experienced COVID-19 symptoms?:  Yes


COVID-19 Testing performed PTA:  No








System review is unobtainable due to patient not being verbal





On examination temperature maximum 100 F, respiratory rate 18-20, heart rate 78

, blood pressure 121/63.  Patient's blood pressure upon arrival was 88 systolic


Patient lethargic


In no acute distress however coughs on and off


Lungs has basal crackles


Heart regular with occasional irregular beats


Abdomen soft


Lower extremities somewhat contracted


Laboratory data reviewed





Laboratory Tests








Test


  8/17/20


21:45 8/17/20


22:00 8/17/20


23:45


 


White Blood Count


  10.6 K/UL


(4.8-10.8) 


  


 


 


Red Blood Count


  3.22 M/UL


(4.70-6.10)  L 


  


 


 


Hemoglobin


  9.7 G/DL


(14.2-18.0)  L 


  


 


 


Hematocrit


  30.7 %


(42.0-52.0)  L 


  


 


 


Mean Corpuscular Volume 95 FL (80-99)    


 


Mean Corpuscular Hemoglobin


  30.1 PG


(27.0-31.0) 


  


 


 


Mean Corpuscular Hemoglobin


Concent 31.5 G/DL


(32.0-36.0)  L 


  


 


 


Red Cell Distribution Width


  15.9 %


(11.6-14.8)  H 


  


 


 


Platelet Count


  102 K/UL


(150-450)  L 


  


 


 


Mean Platelet Volume


  6.9 FL


(6.5-10.1) 


  


 


 


Neutrophils (%) (Auto)


  74.1 %


(45.0-75.0) 


  


 


 


Lymphocytes (%) (Auto)


  14.7 %


(20.0-45.0)  L 


  


 


 


Monocytes (%) (Auto)


  10.1 %


(1.0-10.0)  H 


  


 


 


Eosinophils (%) (Auto)


  0.6 %


(0.0-3.0) 


  


 


 


Basophils (%) (Auto)


  0.6 %


(0.0-2.0) 


  


 


 


Prothrombin Time


  12.3 SEC


(9.30-11.50)  H 


  


 


 


Prothrombin Time INR 1.1 (0.9-1.1)    


 


Activated Partial


Thromboplast Time 22 SEC (23-33)


L 


  


 


 


Lactic Acid Level


  2.50 mmol/L


(0.4-2.0)  H 


  2.50 mmol/L


(0.66-2.22)  H


 


Urine Color  Yellow   


 


Urine Appearance


  


  Slightly


cloudy 


 


 


Urine pH  6.0 (4.5-8.0)   


 


Urine Specific Gravity


  


  1.015


(1.005-1.035) 


 


 


Urine Protein


  


  2+ (NEGATIVE)


H 


 


 


Urine Glucose (UA)


  


  Negative


(NEGATIVE) 


 


 


Urine Ketones


  


  Negative


(NEGATIVE) 


 


 


Urine Blood


  


  Negative


(NEGATIVE) 


 


 


Urine Nitrite


  


  Negative


(NEGATIVE) 


 


 


Urine Bilirubin


  


  Negative


(NEGATIVE) 


 


 


Urine Urobilinogen


  


  Normal MG/DL


(0.0-1.0) 


 


 


Urine Leukocyte Esterase


  


  Negative


(NEGATIVE) 


 


 


Urine RBC


  


  0-2 /HPF (0 -


0)  H 


 


 


Urine WBC


  


  5-10 /HPF (0 -


0)  H 


 


 


Urine Squamous Epithelial


Cells 


  Few /LPF


(NONE/OCC) 


 


 


Urine Bacteria


  


  Few /HPF


(NONE) 


 


 


Urine Mucus


  


  Many /LPF


(NONE/OCC)  H 


 


 


Sodium Level


  


  153 MMOL/L


(136-145)  H 


 


 


Potassium Level


  


  3.9 MMOL/L


(3.5-5.1) 


 


 


Chloride Level


  


  118 MMOL/L


()  H 


 


 


Carbon Dioxide Level


  


  20 MMOL/L


(21-32)  L 


 


 


Anion Gap


  


  15 mmol/L


(5-15) 


 


 


Blood Urea Nitrogen


  


  48 mg/dL


(7-18)  H 


 


 


Creatinine


  


  1.8 MG/DL


(0.55-1.30)  H 


 


 


Estimated Glomerular


Filtration Rate 


  37.3 mL/min


(>60) 


 


 


Glucose Level


  


  138 MG/DL


()  H 


 


 


Calcium Level


  


  8.4 MG/DL


(8.5-10.1)  L 


 


 


Magnesium Level


  


  2.2 MG/DL


(1.8-2.4) 


 


 


Total Bilirubin


  


  0.5 MG/DL


(0.2-1.0) 


 


 


Aspartate Amino Transferase


(AST) 


  53 U/L (15-37)


H 


 


 


Alanine Aminotransferase (ALT)


  


  33 U/L (12-78)


  


 


 


Alkaline Phosphatase


  


  98 U/L


() 


 


 


Total Creatine Kinase


  


  289 U/L


() 


 


 


Troponin I


  


  0.075 ng/mL


(0.000-0.056) 


 


 


Pro-B-Type Natriuretic Peptide


  


  171 pg/mL


(0-125)  H 


 


 


Total Protein


  


  7.4 G/DL


(6.4-8.2) 


 


 


Albumin


  


  2.7 G/DL


(3.4-5.0)  L 


 


 


Globulin  4.7 g/dL   


 


Albumin/Globulin Ratio


  


  0.6 (1.0-2.7)


L 


 


 


Lipase


  


  340 U/L


() 


 








Microbiology








 Date/Time


Source Procedure


Growth Status


 


 


 8/17/20 22:30


Nasopharynx SARS-CoV-2 RdRp Gene Assay - Final Complete








Assessment/Plan





Acute renal failure


Dehydration


Hypernatremia, likely due to free water deficit


Sepsis


UTI


Toxic metabolic encephalopathy


Hypoalbuminemia














Suggestions:


Hold blood pressure medication due to low blood pressure


PRN blood pressure medication for high blood pressure


Today's labs appears to be an error and should be repeated


Slow hydration


Pulmonary toilet


Antibiotics per ID


Monitor renal parameters and electrolyte


Slow hydration


Urine studies





Patient had multiple previous admission here at [facility].  I 

spent an additional 36 minutes on review of medical records including prior 

hospital records,consult notes, progress notes, procedures ,imaging labs, 

hemodynamics, and other clinical documentation. Over 35 min











Alvin Alegria MD Aug 18, 2020 10:47
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
## 2020-08-19 NOTE — NEPHROLOGY PROGRESS NOTE
Assessment/Plan


Problem List:  


(1) MONICA (acute kidney injury)


(2) Electrolyte imbalance


(3) Dehydration


(4) UTI (urinary tract infection)


(5) Sepsis


(6) Encephalopathy


Assessment





Acute renal failure


Dehydration


Hypernatremia, likely due to free water deficit


Sepsis


UTI


Toxic metabolic encephalopathy


Hypoalbuminemia


Plan


August 19: Labs reviewed.  Patient has a Ndiaye catheter.  Serum creatinine down 

to 1.3 from 1.8 on admission.  Serum sodium remains elevated.  Will change IV 

to D5W.  We will keep the blood sugar and blood pressure in check.  Continue 

per consultants.





August 18:


Hold blood pressure medication due to low blood pressure


PRN blood pressure medication for high blood pressure


Today's labs appears to be an error and should be repeated


Slow hydration


Pulmonary toilet


Antibiotics per ID


Monitor renal parameters and electrolyte


Slow hydration


Urine studies





Subjective


ROS Limited/Unobtainable:  No


Constitutional:  Reports: malaise, weakness





Objective


Objective





Last 24 Hour Vital Signs








  Date Time  Temp Pulse Resp B/P (MAP) Pulse Ox O2 Delivery O2 Flow Rate FiO2


 


8/19/20 12:00  55      


 


8/19/20 11:44 97.9 57 18 124/58 (80) 99   


 


8/19/20 09:00      Room Air  


 


8/19/20 08:00  55      


 


8/19/20 08:00 97.6 55 19 119/69 (86) 99   


 


8/19/20 04:00 98.1 59 18 103/55 (71) 99   


 


8/19/20 04:00  60      


 


8/19/20 01:30 98.8       


 


8/19/20 00:00  74      


 


8/19/20 00:00 101.8 72 18 120/69 (86) 93   


 


8/18/20 21:00      Room Air  


 


8/18/20 20:00  82      


 


8/18/20 20:00 99.0 76 20 129/66 (87) 95   


 


8/18/20 17:03      Room Air  


 


8/18/20 16:00  60      


 


8/18/20 16:00 97.9 62 22 115/81 (92) 99   


 


8/18/20 14:25 98.6 65 20 126/58 100 Room Air  

















Intake and Output  


 


 8/18/20 8/19/20





 19:00 07:00


 


Intake Total 1000 ml 


 


Output Total 150 ml 600 ml


 


Balance 850 ml -600 ml


 


  


 


Intake IV Total 1000 ml 


 


Output Urine Total 150 ml 600 ml


 


# Bowel Movements  1











Current Medications








 Medications


  (Trade)  Dose


 Ordered  Sig/Kadeem


 Route


 PRN Reason  Start Time


 Stop Time Status Last Admin


Dose Admin


 


 Acetaminophen


  (Tylenol)  650 mg  Q4H  PRN


 ORAL


 MILD PAIN (1-3)/TEMP >100.5  8/18/20 11:00


 9/17/20 10:59  8/19/20 01:00


 


 


 Aspirin


  (Ecotrin)  81 mg  DAILY


 ORAL


   8/19/20 09:00


 10/3/20 08:59  8/19/20 08:37


 


 


 Ceftriaxone


 Sodium 1 gm/


 Sodium Chloride  55 ml @ 


 110 mls/hr  Q24H


 IVPB


   8/19/20 13:15


 8/26/20 13:14  8/19/20 13:23


 


 


 Dextrose  1,000 ml @ 


 75 mls/hr  [id number]


 IV


   8/19/20 09:30


 9/18/20 09:29  8/19/20 09:30


 


 


 Hydralazine HCl


  (Apresoline)  25 mg  Q4H  PRN


 ORAL


 bp over 160 syst  8/18/20 15:30


 11/16/20 15:29   


 


 


 Pantoprazole


  (Protonix)  40 mg  EVERY 12  HOURS


 IVP


   8/18/20 21:00


 9/17/20 20:59  8/19/20 08:37


 


 


 Rifaximin


  (Xifaxan)  550 mg  TWICE A  DAY


 ORAL


   8/18/20 18:00


 8/25/20 17:59  8/19/20 08:37


 





Laboratory Tests


8/19/20 06:17: 


Sodium Level 154H, Potassium Level 3.5, Chloride Level 123H, Carbon Dioxide 

Level 19L, Anion Gap 12, Blood Urea Nitrogen 35H, Creatinine 1.3, Estimat 

Glomerular Filtration Rate 54.3, Glucose Level 91, Lactic Acid Level 1.40, Uric 

Acid 5.6, Calcium Level 8.5, Phosphorus Level 3.7, Magnesium Level 2.1, Iron 

Level 28L, Total Iron Binding Capacity 277, Percent Iron Saturation 10L, 

Unsaturated Iron Binding 249, Ferritin 48, Total Bilirubin 0.5, Gamma Glutamyl 

Transpeptidase 32, Aspartate Amino Transf (AST/SGOT) 61H, Alanine 

Aminotransferase (ALT/SGPT) 34, Alkaline Phosphatase 79, Total Creatine Kinase 

859H, Troponin I 0.025, C-Reactive Protein, Quantitative 4.6H, Pro-B-Type 

Natriuretic Peptide 191H, Total Protein 6.7, Albumin 2.4L, Globulin 4.3, Albumin

/Globulin Ratio 0.6L, Triglycerides Level 62, Cholesterol Level 93, LDL 

Cholesterol 46, HDL Cholesterol 39L, Cholesterol/HDL Ratio 2.4L, Vitamin B12 

Level 594, Folate 18.7, Thyroid Stimulating Hormone (TSH) 0.985


Height (Feet):  5


Height (Inches):  3.00


Weight (Pounds):  141


General Appearance:  no apparent distress, lethargic


Neck:  limited range of motion


Cardiovascular:  bradycardia


Respiratory/Chest:  decreased breath sounds


Abdomen:  distended











Alvin Alegria MD Aug 19, 2020 14:27
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
## 2020-08-19 NOTE — INFECTIOUS DISEASES PROG NOTE
Assessment/Plan


Assessment/Plan


IMPRESSION:  


Sepsis 


UTI. 


Lactic acidosis, 


Acute renal failure, 


Cirrhosis, 


Portal hypertension, 


Hepatitis C, 


Anemia, 


Dementia. 


Status post splenectomy.





RECOMMENDATION:  


Continue ceftriaxone.  


We will follow up the cultures.





Subjective


ROS Limited/Unobtainable:  Yes


Allergies:  


Coded Allergies:  


     No Known Allergies (Unverified , 8/19/18)





Objective





Last 24 Hour Vital Signs








  Date Time  Temp Pulse Resp B/P (MAP) Pulse Ox O2 Delivery O2 Flow Rate FiO2


 


8/19/20 12:00  55      


 


8/19/20 11:44 97.9 57 18 124/58 (80) 99   


 


8/19/20 09:00      Room Air  


 


8/19/20 08:00  55      


 


8/19/20 08:00 97.6 55 19 119/69 (86) 99   


 


8/19/20 04:00 98.1 59 18 103/55 (71) 99   


 


8/19/20 04:00  60      


 


8/19/20 01:30 98.8       


 


8/19/20 00:00  74      


 


8/19/20 00:00 101.8 72 18 120/69 (86) 93   


 


8/18/20 21:00      Room Air  


 


8/18/20 20:00  82      


 


8/18/20 20:00 99.0 76 20 129/66 (87) 95   


 


8/18/20 17:03      Room Air  


 


8/18/20 16:00  60      


 


8/18/20 16:00 97.9 62 22 115/81 (92) 99   


 


8/18/20 14:25 98.6 65 20 126/58 100 Room Air  








Height (Feet):  5


Height (Inches):  3.00


Weight (Pounds):  141


General Appearance:  no acute distress


HEENT:  mucous membranes moist


Respiratory/Chest:  lungs clear


Cardiovascular:  bradycardia


Abdomen:  soft, non tender


Extremities:  other - hands edema


Neurologic/Psychiatric:  alert, responsive





Microbiology








 Date/Time


Source Procedure


Growth Status


 


 


 8/17/20 22:00


Blood Blood Culture - Preliminary


NO GROWTH AFTER 24 HOURS Resulted


 


 8/17/20 21:45


Blood Blood Culture - Preliminary


NO GROWTH AFTER 24 HOURS Resulted


 


 8/17/20 22:30


Nasopharynx SARS-CoV-2 RdRp Gene Assay - Final Complete











Laboratory Tests








Test


  8/19/20


06:17


 


Sodium Level


  154 MMOL/L


(136-145)  H


 


Potassium Level


  3.5 MMOL/L


(3.5-5.1)


 


Chloride Level


  123 MMOL/L


()  H


 


Carbon Dioxide Level


  19 MMOL/L


(21-32)  L


 


Anion Gap


  12 mmol/L


(5-15)


 


Blood Urea Nitrogen


  35 mg/dL


(7-18)  H


 


Creatinine


  1.3 MG/DL


(0.55-1.30)


 


Estimat Glomerular Filtration


Rate 54.3 mL/min


(>60)


 


Glucose Level


  91 MG/DL


()


 


Lactic Acid Level


  1.40 mmol/L


(0.4-2.0)


 


Uric Acid


  5.6 MG/DL


(2.6-7.2)


 


Calcium Level


  8.5 MG/DL


(8.5-10.1)


 


Phosphorus Level


  3.7 MG/DL


(2.5-4.9)


 


Magnesium Level


  2.1 MG/DL


(1.8-2.4)


 


Iron Level


  28 ug/dL


()  L


 


Total Iron Binding Capacity


  277 ug/dL


(250-450)


 


Percent Iron Saturation 10 % (15-50)  L


 


Unsaturated Iron Binding


  249 ug/dL


(112-346)


 


Ferritin


  48 NG/ML


(8-388)


 


Total Bilirubin


  0.5 MG/DL


(0.2-1.0)


 


Gamma Glutamyl Transpeptidase 32 U/L (5-85)  


 


Aspartate Amino Transf


(AST/SGOT) 61 U/L (15-37)


H


 


Alanine Aminotransferase


(ALT/SGPT) 34 U/L (12-78)


 


 


Alkaline Phosphatase


  79 U/L


()


 


Total Creatine Kinase


  859 U/L


()  H


 


Troponin I


  0.025 ng/mL


(0.000-0.056)


 


C-Reactive Protein,


Quantitative 4.6 mg/dL


(0.00-0.90)  H


 


Pro-B-Type Natriuretic Peptide


  191 pg/mL


(0-125)  H


 


Total Protein


  6.7 G/DL


(6.4-8.2)


 


Albumin


  2.4 G/DL


(3.4-5.0)  L


 


Globulin 4.3 g/dL  


 


Albumin/Globulin Ratio


  0.6 (1.0-2.7)


L


 


Triglycerides Level


  62 MG/DL


()


 


Cholesterol Level


  93 MG/DL (<


200)


 


LDL Cholesterol


  46 mg/dL


(<100)


 


HDL Cholesterol


  39 MG/DL


(40-60)  L


 


Cholesterol/HDL Ratio


  2.4 (3.3-4.4)


L


 


Vitamin B12 Level


  594 PG/ML


(193-986)


 


Folate


  18.7 NG/ML


(8.6-58.9)


 


Thyroid Stimulating Hormone


(TSH) 0.985 uiU/mL


(0.358-3.740)











Current Medications








 Medications


  (Trade)  Dose


 Ordered  Sig/Kadeem


 Route


 PRN Reason  Start Time


 Stop Time Status Last Admin


Dose Admin


 


 Acetaminophen


  (Tylenol)  650 mg  Q4H  PRN


 ORAL


 MILD PAIN (1-3)/TEMP >100.5  8/18/20 11:00


 9/17/20 10:59  8/19/20 01:00


 


 


 Aspirin


  (Ecotrin)  81 mg  DAILY


 ORAL


   8/19/20 09:00


 10/3/20 08:59  8/19/20 08:37


 


 


 Ceftriaxone


 Sodium 1 gm/


 Sodium Chloride  55 ml @ 


 110 mls/hr  Q24H


 IVPB


   8/19/20 13:15


 8/26/20 13:14  8/19/20 13:23


 


 


 Dextrose  1,000 ml @ 


 75 mls/hr  [id number]


 IV


   8/19/20 09:30


 9/18/20 09:29  8/19/20 09:30


 


 


 Hydralazine HCl


  (Apresoline)  25 mg  Q4H  PRN


 ORAL


 bp over 160 syst  8/18/20 15:30


 11/16/20 15:29   


 


 


 Pantoprazole


  (Protonix)  40 mg  EVERY 12  HOURS


 IVP


   8/18/20 21:00


 9/17/20 20:59  8/19/20 08:37


 


 


 Rifaximin


  (Xifaxan)  550 mg  TWICE A  DAY


 ORAL


   8/18/20 18:00


 8/25/20 17:59  8/19/20 08:37


 

















Jeremy Martin MD Aug 19, 2020 14:22

## 2020-08-19 NOTE — HISTORY AND PHYSICAL REPORT
DATE OF ADMISSION:  08/18/2020

HISTORY OF PRESENT ILLNESS:  Patient is [place]ian speaking patient.  Poor

historian due to dementia.  Patient is admitted because had a fever of 105

at the nursing home and he is being admitted for acute renal failure,

hypernatremia due to dehydration, and urinary tract infection, most likely

source of fever, UTI.  Cannot get any history from the patient.  Patient

basically again is a poor historian.  Patient basically came in with upper

respiratory symptoms.  Patient is unresponsive, unable to give any

history.  Patient basically is being admitted for septic shock.

Originally was hypotensive, responded to fluids.  Patient's fever

responded to antipyretics.



PAST MEDICAL HISTORY:  Dementia, hepatitis C, liver cirrhosis, possible

liver cancer, history of thrombocytopenia, history of spinal stenosis,

history of gallstone, history of hypertension, constipation,

hyperlipidemia, psychosis, GERD.



PAST SURGICAL HISTORY:  None known.



MEDICATIONS:  Lipitor, aspirin, vitamin C, bisacodyl, clonidine, docusate,

olanzapine, Protonix, rifaximin, vitamin D, and zinc sulfate.



ALLERGIES:  No known allergies.



FAMILY HISTORY:  Noncontributory.



SOCIAL HISTORY:  Unable to obtain.



REVIEW OF SYSTEMS:  Unable to obtain.  Patient is nonverbal.



PHYSICAL EXAMINATION:

VITAL SIGNS:  Temperature 98.6, pulse is 66, blood pressure is 125/59.

HEENT:  PERRLA.

NECK:  Supple.  No lymphadenopathy.

LUNGS:  Clear to auscultation.

CARDIOVASCULAR:  Regular rate and rhythm.  No murmurs or extra sounds.

GASTROINTESTINAL:  Soft.  Positive bowel sounds.

EXTREMITIES:  No edema.

NEUROLOGIC:  Lethargic, cannot respond, but responds to noxious stimuli.

Oriented x0.



LABORATORY DATA:   WBC of 10.6, hemoglobin 9.7, platelets 102.  Sodium 154,

potassium 2.5, chloride 129, BUN of 25, creatinine 0.8, calcium 5.2.



ASSESSMENT AND PLAN:  Severe hypokalemia, hypernatremia due to dehydration,

UTI, acute renal failure, sepsis.  I have asked Dr. Alegria, Dr. Edu Evans, Dr. Jeremy Martin see the patient for the above-mentioned

abnormalities and treatment for abnormal symptoms.  Antibiotics per Dr. Jeremy Martin.









  ______________________________________________

  Martin Payan M.D.





DR:  CATALINA

D:  08/18/2020 22:47

T:  08/18/2020 23:38

JOB#:  [id number]

CC:

## 2020-08-19 NOTE — PULMONOLOGY PROGRESS NOTE
Subjective


ROS Limited/Unobtainable:  No


Interval Events:  COVID 19 test is negative


Constitutional:  Reports: no symptoms


HEENT:  Repors: no symptoms


Respiratory:  Reports: no symptoms


Cardiovascular:  Reports: no symptoms


Gastrointestinal/Abdominal:  Reports: no symptoms


Allergies:  


Coded Allergies:  


     No Known Allergies (Unverified , 8/19/18)





Objective





Last 24 Hour Vital Signs








  Date Time  Temp Pulse Resp B/P (MAP) Pulse Ox O2 Delivery O2 Flow Rate FiO2


 


8/19/20 16:00 98.0 59 18 129/76 (93) 98   


 


8/19/20 12:00  55      


 


8/19/20 11:44 97.9 57 18 124/58 (80) 99   


 


8/19/20 09:00      Room Air  


 


8/19/20 08:00  55      


 


8/19/20 08:00 97.6 55 19 119/69 (86) 99   


 


8/19/20 04:00 98.1 59 18 103/55 (71) 99   


 


8/19/20 04:00  60      


 


8/19/20 01:30 98.8       


 


8/19/20 00:00  74      


 


8/19/20 00:00 101.8 72 18 120/69 (86) 93   


 


8/18/20 21:00      Room Air  


 


8/18/20 20:00  82      


 


8/18/20 20:00 99.0 76 20 129/66 (87) 95   


 


8/18/20 17:03      Room Air  

















Intake and Output  


 


 8/18/20 8/19/20





 19:00 07:00


 


Intake Total 1000 ml 


 


Output Total 150 ml 600 ml


 


Balance 850 ml -600 ml


 


  


 


Intake IV Total 1000 ml 


 


Output Urine Total 150 ml 600 ml


 


# Bowel Movements  1








General Appearance:  no acute distress


HEENT:  normocephalic


Respiratory:  chest wall non-tender, lungs clear


Cardiovascular:  normal peripheral pulses


Abdomen:  normal bowel sounds





Microbiology








 Date/Time


Source Procedure


Growth Status


 


 


 8/17/20 22:00


Blood Blood Culture - Preliminary


NO GROWTH AFTER 24 HOURS Resulted


 


 8/17/20 21:45


Blood Blood Culture - Preliminary


NO GROWTH AFTER 24 HOURS Resulted


 


 8/17/20 22:30


Nasopharynx SARS-CoV-2 RdRp Gene Assay - Final Complete








Laboratory Tests


8/19/20 06:17: 


Sodium Level 154H, Potassium Level 3.5, Chloride Level 123H, Carbon Dioxide 

Level 19L, Anion Gap 12, Blood Urea Nitrogen 35H, Creatinine 1.3, Estimat 

Glomerular Filtration Rate 54.3, Glucose Level 91, Lactic Acid Level 1.40, Uric 

Acid 5.6, Calcium Level 8.5, Phosphorus Level 3.7, Magnesium Level 2.1, Iron 

Level 28L, Total Iron Binding Capacity 277, Percent Iron Saturation 10L, 

Unsaturated Iron Binding 249, Ferritin 48, Total Bilirubin 0.5, Gamma Glutamyl 

Transpeptidase 32, Aspartate Amino Transf (AST/SGOT) 61H, Alanine 

Aminotransferase (ALT/SGPT) 34, Alkaline Phosphatase 79, Total Creatine Kinase 

859H, Troponin I 0.025, C-Reactive Protein, Quantitative 4.6H, Pro-B-Type 

Natriuretic Peptide 191H, Total Protein 6.7, Albumin 2.4L, Globulin 4.3, Albumin

/Globulin Ratio 0.6L, Triglycerides Level 62, Cholesterol Level 93, LDL 

Cholesterol 46, HDL Cholesterol 39L, Cholesterol/HDL Ratio 2.4L, Vitamin B12 

Level 594, Folate 18.7, Thyroid Stimulating Hormone (TSH) 0.985





Current Medications








 Medications


  (Trade)  Dose


 Ordered  Sig/Kadeem


 Route


 PRN Reason  Start Time


 Stop Time Status Last Admin


Dose Admin


 


 Acetaminophen


  (Tylenol)  650 mg  Q4H  PRN


 ORAL


 MILD PAIN (1-3)/TEMP >100.5  8/18/20 11:00


 9/17/20 10:59  8/19/20 01:00


 


 


 Aspirin


  (Ecotrin)  81 mg  DAILY


 ORAL


   8/19/20 09:00


 10/3/20 08:59  8/19/20 08:37


 


 


 Ceftriaxone


 Sodium 1 gm/


 Sodium Chloride  55 ml @ 


 110 mls/hr  Q24H


 IVPB


   8/19/20 13:15


 8/26/20 13:14  8/19/20 13:23


 


 


 Dextrose  1,000 ml @ 


 75 mls/hr  [id number]


 IV


   8/19/20 09:30


 9/18/20 09:29  8/19/20 09:30


 


 


 Hydralazine HCl


  (Apresoline)  25 mg  Q4H  PRN


 ORAL


 bp over 160 syst  8/18/20 15:30


 11/16/20 15:29   


 


 


 Pantoprazole


  (Protonix)  40 mg  EVERY 12  HOURS


 IVP


   8/18/20 21:00


 9/17/20 20:59  8/19/20 08:37


 


 


 Rifaximin


  (Xifaxan)  550 mg  TWICE A  DAY


 ORAL


   8/18/20 18:00


 8/25/20 17:59  8/19/20 08:37


 











Assessment/Plan


Assessment/Plan


IMPRESSION:


1. Ruled out for COVID-19 pneumonia.


2. Nursing home resident.


3. Psoriasis.


4. Dementia.


5. CVA.


6. History of hepatitis C.


7. CHF.





DISCUSSION:  Continue correction of electrolytes and


hypernatremia, fluid hydration.  Is negative for COVID 19; not hypoxic


I will follow as pulmonologist.














  ______________________________________________


  Edu Evans M.D.











Edu Evans MD Aug 19, 2020 16:49

## 2020-08-19 NOTE — GENERAL PROGRESS NOTE
Assessment/Plan


Problem List:  


(1) Renal failure


ICD Codes:  N19 - Unspecified kidney failure


SNOMED:  95533252


Qualifiers:  


   Qualified Codes:  N17.9 - Acute kidney failure, unspecified


(2) Dehydration


ICD Codes:  E86.0 - Dehydration


SNOMED:  63650099


(3) Encephalopathy


ICD Codes:  G93.40 - Encephalopathy, unspecified


SNOMED:  44028271


(4) UTI (urinary tract infection)


ICD Codes:  N39.0 - Urinary tract infection, site not specified


SNOMED:  61334836


Qualifiers:  


   Qualified Codes:  N39.0 - Urinary tract infection, site not specified


(5) Dehydration


ICD Codes:  E86.0 - Dehydration


SNOMED:  09945411


(6) Electrolyte imbalance


ICD Codes:  E87.8 - Other disorders of electrolyte and fluid balance, not 

elsewhere classified


SNOMED:  402396876


(7) Sepsis


ICD Codes:  A41.9 - Sepsis, unspecified organism


SNOMED:  27183748


Qualifiers:  


   Qualified Codes:  A41.9 - Sepsis, unspecified organism; R65.20 - Severe 

sepsis without septic shock; G93.40 - Encephalopathy, unspecified


(8) MONICA (acute kidney injury)


ICD Codes:  N17.9 - Acute kidney failure, unspecified


SNOMED:  3852450, 68298092


Assessment/Plan:


lyte abnormality


sepsis


uti


arf


afebrile


abx per id


bp improved





Subjective


ROS Limited/Unobtainable:  Yes


Allergies:  


Coded Allergies:  


     No Known Allergies (Unverified , 8/19/18)





Objective





Last 24 Hour Vital Signs








  Date Time  Temp Pulse Resp B/P (MAP) Pulse Ox O2 Delivery O2 Flow Rate FiO2


 


8/19/20 21:00      Room Air  


 


8/19/20 20:00 98.8 68 17 103/66 (78) 98   


 


8/19/20 19:34  63      


 


8/19/20 16:00  97      


 


8/19/20 16:00 98.0 59 18 129/76 (93) 98   


 


8/19/20 12:00  55      


 


8/19/20 11:44 97.9 57 18 124/58 (80) 99   


 


8/19/20 09:00      Room Air  


 


8/19/20 08:00  55      


 


8/19/20 08:00 97.6 55 19 119/69 (86) 99   


 


8/19/20 04:00 98.1 59 18 103/55 (71) 99   


 


8/19/20 04:00  60      


 


8/19/20 01:30 98.8       


 


8/19/20 00:00  74      


 


8/19/20 00:00 101.8 72 18 120/69 (86) 93   

















Intake and Output  


 


 8/18/20 8/19/20





 19:00 07:00


 


Intake Total 1000 ml 


 


Output Total 150 ml 600 ml


 


Balance 850 ml -600 ml


 


  


 


IV Total 1000 ml 


 


Output Urine Total 150 ml 600 ml


 


# Bowel Movements  1








Laboratory Tests


8/19/20 06:17: 


Sodium Level 154H, Potassium Level 3.5, Chloride Level 123H, Carbon Dioxide 

Level 19L, Anion Gap 12, Blood Urea Nitrogen 35H, Creatinine 1.3, Estimat 

Glomerular Filtration Rate 54.3, Glucose Level 91, Lactic Acid Level 1.40, Uric 

Acid 5.6, Calcium Level 8.5, Phosphorus Level 3.7, Magnesium Level 2.1, Iron 

Level 28L, Total Iron Binding Capacity 277, Percent Iron Saturation 10L, 

Unsaturated Iron Binding 249, Ferritin 48, Total Bilirubin 0.5, Gamma Glutamyl 

Transpeptidase 32, Aspartate Amino Transf (AST/SGOT) 61H, Alanine 

Aminotransferase (ALT/SGPT) 34, Alkaline Phosphatase 79, Total Creatine Kinase 

859H, Troponin I 0.025, C-Reactive Protein, Quantitative 4.6H, Pro-B-Type 

Natriuretic Peptide 191H, Total Protein 6.7, Albumin 2.4L, Globulin 4.3, Albumin

/Globulin Ratio 0.6L, Triglycerides Level 62, Cholesterol Level 93, LDL 

Cholesterol 46, HDL Cholesterol 39L, Cholesterol/HDL Ratio 2.4L, Vitamin B12 

Level 594, Folate 18.7, Thyroid Stimulating Hormone (TSH) 0.985


Height (Feet):  5


Height (Inches):  3.00


Weight (Pounds):  141











Martin Payan MD Aug 19, 2020 22:09

## 2020-08-19 NOTE — CARDIAC ELECTROPHYSIOLOGY PN
Subjective


Subjective


[id number]





Objective





Last 24 Hour Vital Signs








  Date Time  Temp Pulse Resp B/P (MAP) Pulse Ox O2 Delivery O2 Flow Rate FiO2


 


8/19/20 12:00  55      


 


8/19/20 11:44 97.9 57 18 124/58 (80) 99   


 


8/19/20 09:00      Room Air  


 


8/19/20 08:00  55      


 


8/19/20 08:00 97.6 55 19 119/69 (86) 99   


 


8/19/20 04:00 98.1 59 18 103/55 (71) 99   


 


8/19/20 04:00  60      


 


8/19/20 01:30 98.8       


 


8/19/20 00:00  74      


 


8/19/20 00:00 101.8 72 18 120/69 (86) 93   


 


8/18/20 21:00      Room Air  


 


8/18/20 20:00  82      


 


8/18/20 20:00 99.0 76 20 129/66 (87) 95   


 


8/18/20 17:03      Room Air  


 


8/18/20 16:00  60      


 


8/18/20 16:00 97.9 62 22 115/81 (92) 99   


 


8/18/20 14:25 98.6 65 20 126/58 100 Room Air  


 


8/18/20 14:11  70 19   Room Air  

















Intake and Output  


 


 8/18/20 8/19/20





 19:00 07:00


 


Intake Total 1000 ml 


 


Output Total 150 ml 600 ml


 


Balance 850 ml -600 ml


 


  


 


Intake IV Total 1000 ml 


 


Output Urine Total 150 ml 600 ml


 


# Bowel Movements  1











Laboratory Tests








Test


  8/19/20


06:17


 


Sodium Level


  154 MMOL/L


(136-145)  H


 


Potassium Level


  3.5 MMOL/L


(3.5-5.1)


 


Chloride Level


  123 MMOL/L


()  H


 


Carbon Dioxide Level


  19 MMOL/L


(21-32)  L


 


Anion Gap


  12 mmol/L


(5-15)


 


Blood Urea Nitrogen


  35 mg/dL


(7-18)  H


 


Creatinine


  1.3 MG/DL


(0.55-1.30)


 


Estimat Glomerular Filtration


Rate 54.3 mL/min


(>60)


 


Glucose Level


  91 MG/DL


()


 


Lactic Acid Level


  1.40 mmol/L


(0.4-2.0)


 


Uric Acid


  5.6 MG/DL


(2.6-7.2)


 


Calcium Level


  8.5 MG/DL


(8.5-10.1)


 


Phosphorus Level


  3.7 MG/DL


(2.5-4.9)


 


Magnesium Level


  2.1 MG/DL


(1.8-2.4)


 


Iron Level


  28 ug/dL


()  L


 


Total Iron Binding Capacity


  277 ug/dL


(250-450)


 


Percent Iron Saturation 10 % (15-50)  L


 


Unsaturated Iron Binding


  249 ug/dL


(112-346)


 


Ferritin


  48 NG/ML


(8-388)


 


Total Bilirubin


  0.5 MG/DL


(0.2-1.0)


 


Gamma Glutamyl Transpeptidase 32 U/L (5-85)  


 


Aspartate Amino Transf


(AST/SGOT) 61 U/L (15-37)


H


 


Alanine Aminotransferase


(ALT/SGPT) 34 U/L (12-78)


 


 


Alkaline Phosphatase


  79 U/L


()


 


Total Creatine Kinase


  859 U/L


()  H


 


Troponin I


  0.025 ng/mL


(0.000-0.056)


 


C-Reactive Protein,


Quantitative 4.6 mg/dL


(0.00-0.90)  H


 


Pro-B-Type Natriuretic Peptide


  191 pg/mL


(0-125)  H


 


Total Protein


  6.7 G/DL


(6.4-8.2)


 


Albumin


  2.4 G/DL


(3.4-5.0)  L


 


Globulin 4.3 g/dL  


 


Albumin/Globulin Ratio


  0.6 (1.0-2.7)


L


 


Triglycerides Level


  62 MG/DL


()


 


Cholesterol Level


  93 MG/DL (<


200)


 


LDL Cholesterol


  46 mg/dL


(<100)


 


HDL Cholesterol


  39 MG/DL


(40-60)  L


 


Cholesterol/HDL Ratio


  2.4 (3.3-4.4)


L


 


Vitamin B12 Level


  594 PG/ML


(193-986)


 


Folate


  18.7 NG/ML


(8.6-58.9)


 


Thyroid Stimulating Hormone


(TSH) 0.985 uiU/mL


(0.358-3.740)











Microbiology








 Date/Time


Source Procedure


Growth Status


 


 


 8/17/20 22:00


Blood Blood Culture - Preliminary


NO GROWTH AFTER 24 HOURS Resulted


 


 8/17/20 21:45


Blood Blood Culture - Preliminary


NO GROWTH AFTER 24 HOURS Resulted


 


 8/17/20 22:30


Nasopharynx SARS-CoV-2 RdRp Gene Assay - Final Complete

















Levi Otero MD Aug 19, 2020 13:54

## 2020-08-20 NOTE — CARDIAC ELECTROPHYSIOLOGY PN
Assessment/Plan


Assessment/Plan





1. Elevated troponin at 0.075, likely due to renal failure.  Initial BUN


and creatinine were 48 and 1.8.  Two subsequent troponins are negative and


BUN now is 35 and creatinine 1.3.  EF 65%. Patient is nonverbal.





2. Bradycardia and right bundle-branch block.  Heart rate is in the 50s,


off any sinus or AV jonathan blocking agent. 





3. Hypertension, on p.r.n. hydralazine.


4. Dehydration with hypernatremia and azotemia.  Patient is on IV fluids.


5. Pneumonia, on ceftriaxone.


6. Anemia, hemoglobin 9.7.





Subjective


Subjective


Confused in NAD. No CP or SOB





Objective





Last 24 Hour Vital Signs








  Date Time  Temp Pulse Resp B/P (MAP) Pulse Ox O2 Delivery O2 Flow Rate FiO2


 


8/20/20 12:00 97.2 62 18 100/54 (69) 97   


 


8/20/20 11:40  62      


 


8/20/20 09:00      Room Air  


 


8/20/20 07:50 97.5 55 20 116/59 (78) 98   


 


8/20/20 07:36  55      


 


8/20/20 04:00 97.2 66 18 110/63 (79) 99   


 


8/20/20 04:00  53      


 


8/20/20 00:00 97.7 55 16 102/66 (78) 97   


 


8/19/20 23:42  55      


 


8/19/20 21:00      Room Air  


 


8/19/20 20:00 98.8 68 17 103/66 (78) 98   


 


8/19/20 19:34  63      


 


8/19/20 16:00  97      


 


8/19/20 16:00 98.0 59 18 129/76 (93) 98   

















Intake and Output  


 


 8/19/20 8/20/20





 19:00 07:00


 


Intake Total 825 ml 1362 ml


 


Balance 825 ml 1362 ml


 


  


 


Intake Oral 750 ml 800 ml


 


IV Total 75 ml 562 ml


 


# Bowel Movements 1 











Laboratory Tests








Test


  8/20/20


05:32


 


White Blood Count


  7.7 K/UL


(4.8-10.8)


 


Red Blood Count


  2.95 M/UL


(4.70-6.10)  L


 


Hemoglobin


  8.9 G/DL


(14.2-18.0)  L


 


Hematocrit


  28.1 %


(42.0-52.0)  L


 


Mean Corpuscular Volume 95 FL (80-99)  


 


Mean Corpuscular Hemoglobin


  30.1 PG


(27.0-31.0)


 


Mean Corpuscular Hemoglobin


Concent 31.6 G/DL


(32.0-36.0)  L


 


Red Cell Distribution Width


  15.7 %


(11.6-14.8)  H


 


Platelet Count


  99 K/UL


(150-450)  L


 


Mean Platelet Volume


  7.7 FL


(6.5-10.1)


 


Neutrophils (%) (Auto)


  % (45.0-75.0)


 


 


Lymphocytes (%) (Auto)


  % (20.0-45.0)


 


 


Monocytes (%) (Auto)  % (1.0-10.0)  


 


Eosinophils (%) (Auto)  % (0.0-3.0)  


 


Basophils (%) (Auto)  % (0.0-2.0)  


 


Differential Total Cells


Counted 100  


 


 


Neutrophils % (Manual) 62 % (45-75)  


 


Lymphocytes % (Manual) 21 % (20-45)  


 


Monocytes % (Manual) 6 % (1-10)  


 


Eosinophils % (Manual) 10 % (0-3)  H


 


Basophils % (Manual) 1 % (0-2)  


 


Band Neutrophils 0 % (0-8)  


 


Platelet Estimate Decreased  L


 


Platelet Morphology Normal  


 


Hypochromasia Occasional  


 


Anisocytosis 1+  


 


Sodium Level


  147 MMOL/L


(136-145)  H


 


Potassium Level


  3.5 MMOL/L


(3.5-5.1)


 


Chloride Level


  115 MMOL/L


()  H


 


Carbon Dioxide Level


  21 MMOL/L


(21-32)


 


Anion Gap


  11 mmol/L


(5-15)


 


Blood Urea Nitrogen


  28 mg/dL


(7-18)  H


 


Creatinine


  1.0 MG/DL


(0.55-1.30)


 


Estimat Glomerular Filtration


Rate > 60 mL/min


(>60)


 


Glucose Level


  89 MG/DL


()


 


Uric Acid


  5.6 MG/DL


(2.6-7.2)


 


Calcium Level


  7.9 MG/DL


(8.5-10.1)  L


 


Phosphorus Level


  3.0 MG/DL


(2.5-4.9)


 


Magnesium Level


  2.0 MG/DL


(1.8-2.4)


 


Total Bilirubin


  0.5 MG/DL


(0.2-1.0)


 


Aspartate Amino Transf


(AST/SGOT) 81 U/L (15-37)


H


 


Alanine Aminotransferase


(ALT/SGPT) 29 U/L (12-78)


 


 


Alkaline Phosphatase


  85 U/L


()


 


C-Reactive Protein,


Quantitative 2.8 mg/dL


(0.00-0.90)  H


 


Pro-B-Type Natriuretic Peptide


  151 pg/mL


(0-125)  H


 


Total Protein


  6.8 G/DL


(6.4-8.2)


 


Albumin


  2.4 G/DL


(3.4-5.0)  L


 


Globulin 4.4 g/dL  


 


Albumin/Globulin Ratio


  0.5 (1.0-2.7)


L


 


Thyroid Stimulating Hormone


(TSH) 1.662 uiU/mL


(0.358-3.740)


 


Free Thyroxine


  0.86 NG/DL


(0.76-1.46)











Microbiology








 Date/Time


Source Procedure


Growth Status


 


 


 8/17/20 22:00


Blood Blood Culture - Preliminary


NO GROWTH AFTER 48 HOURS Resulted


 


 8/17/20 21:45


Blood Blood Culture - Preliminary


NO GROWTH AFTER 48 HOURS Resulted


 


 8/17/20 22:30


Nasopharynx SARS-CoV-2 RdRp Gene Assay - Final Complete


 


 8/17/20 22:00


Nasal Nares MRSA Culture - Final


NO METHICILLIN RESISTANT STAPH AUREUS... Complete


 


 8/17/20 22:00


Rectum - Final


NO CARBAPENEM-RESISTANT ENTEROBACTERI... Complete


 


 8/17/20 22:00


Rectum VRE Culture - Final


Enterococcus Faecalis - Vre Complete








Objective





HEAD AND NECK:  Showed no JVD.


LUNGS:  Clear.


CARDIOVASCULAR:  Bradycardic.  S1 and S2 with no gallop or murmur.


ABDOMEN:  Soft.


EXTREMITIES:  No pitting edema.











Levi Otero MD Aug 20, 2020 15:51

## 2020-08-20 NOTE — INFECTIOUS DISEASES PROG NOTE
Assessment/Plan


Assessment/Plan


IMPRESSION:  


Sepsis 


UTI. 


Lactic acidosis, 


Acute renal failure, 


Cirrhosis, 


Portal hypertension, 


Hepatitis C, 


Anemia, 


Dementia. 


Status post splenectomy.





RECOMMENDATION:  


Continue ceftriaxone.  


We will follow up the cultures.





Subjective


ROS Limited/Unobtainable:  Yes


Constitutional:  Denies: fever


Allergies:  


Coded Allergies:  


     No Known Allergies (Unverified , 8/19/18)





Objective





Last 24 Hour Vital Signs








  Date Time  Temp Pulse Resp B/P (MAP) Pulse Ox O2 Delivery O2 Flow Rate FiO2


 


8/20/20 09:00      Room Air  


 


8/20/20 07:50 97.5 55 20 116/59 (78) 98   


 


8/20/20 07:36  55      


 


8/20/20 04:00 97.2 66 18 110/63 (79) 99   


 


8/20/20 04:00  53      


 


8/20/20 00:00 97.7 55 16 102/66 (78) 97   


 


8/19/20 23:42  55      


 


8/19/20 21:00      Room Air  


 


8/19/20 20:00 98.8 68 17 103/66 (78) 98   


 


8/19/20 19:34  63      


 


8/19/20 16:00  97      


 


8/19/20 16:00 98.0 59 18 129/76 (93) 98   


 


8/19/20 12:00  55      


 


8/19/20 11:44 97.9 57 18 124/58 (80) 99   








Height (Feet):  5


Height (Inches):  3.00


Weight (Pounds):  141


HEENT:  mucous membranes moist


Respiratory/Chest:  lungs clear


Cardiovascular:  normal rate, bradycardia


Abdomen:  soft, non tender


Extremities:  no edema


Neurologic/Psychiatric:  other - sleeping





Microbiology








 Date/Time


Source Procedure


Growth Status


 


 


 8/17/20 22:00


Blood Blood Culture - Preliminary


NO GROWTH AFTER 48 HOURS Resulted


 


 8/17/20 21:45


Blood Blood Culture - Preliminary


NO GROWTH AFTER 48 HOURS Resulted


 


 8/17/20 22:30


Nasopharynx SARS-CoV-2 RdRp Gene Assay - Final Complete


 


 8/17/20 22:00


Rectum - Final


NO CARBAPENEM-RESISTANT ENTEROBACTERI... Complete


 


 8/17/20 22:00


Rectum VRE Culture - Final


Enterococcus Faecalis - Vre Complete











Laboratory Tests








Test


  8/20/20


05:32


 


White Blood Count


  7.7 K/UL


(4.8-10.8)


 


Red Blood Count


  2.95 M/UL


(4.70-6.10)  L


 


Hemoglobin


  8.9 G/DL


(14.2-18.0)  L


 


Hematocrit


  28.1 %


(42.0-52.0)  L


 


Mean Corpuscular Volume 95 FL (80-99)  


 


Mean Corpuscular Hemoglobin


  30.1 PG


(27.0-31.0)


 


Mean Corpuscular Hemoglobin


Concent 31.6 G/DL


(32.0-36.0)  L


 


Red Cell Distribution Width


  15.7 %


(11.6-14.8)  H


 


Platelet Count


  99 K/UL


(150-450)  L


 


Mean Platelet Volume


  7.7 FL


(6.5-10.1)


 


Neutrophils (%) (Auto)


  % (45.0-75.0)


 


 


Lymphocytes (%) (Auto)


  % (20.0-45.0)


 


 


Monocytes (%) (Auto)  % (1.0-10.0)  


 


Eosinophils (%) (Auto)  % (0.0-3.0)  


 


Basophils (%) (Auto)  % (0.0-2.0)  


 


Differential Total Cells


Counted 100  


 


 


Neutrophils % (Manual) 62 % (45-75)  


 


Lymphocytes % (Manual) 21 % (20-45)  


 


Monocytes % (Manual) 6 % (1-10)  


 


Eosinophils % (Manual) 10 % (0-3)  H


 


Basophils % (Manual) 1 % (0-2)  


 


Band Neutrophils 0 % (0-8)  


 


Platelet Estimate Decreased  L


 


Platelet Morphology Normal  


 


Hypochromasia Occasional  


 


Anisocytosis 1+  


 


Sodium Level


  147 MMOL/L


(136-145)  H


 


Potassium Level


  3.5 MMOL/L


(3.5-5.1)


 


Chloride Level


  115 MMOL/L


()  H


 


Carbon Dioxide Level


  21 MMOL/L


(21-32)


 


Anion Gap


  11 mmol/L


(5-15)


 


Blood Urea Nitrogen


  28 mg/dL


(7-18)  H


 


Creatinine


  1.0 MG/DL


(0.55-1.30)


 


Estimat Glomerular Filtration


Rate > 60 mL/min


(>60)


 


Glucose Level


  89 MG/DL


()


 


Uric Acid


  5.6 MG/DL


(2.6-7.2)


 


Calcium Level


  7.9 MG/DL


(8.5-10.1)  L


 


Phosphorus Level


  3.0 MG/DL


(2.5-4.9)


 


Magnesium Level


  2.0 MG/DL


(1.8-2.4)


 


Total Bilirubin


  0.5 MG/DL


(0.2-1.0)


 


Aspartate Amino Transf


(AST/SGOT) 81 U/L (15-37)


H


 


Alanine Aminotransferase


(ALT/SGPT) 29 U/L (12-78)


 


 


Alkaline Phosphatase


  85 U/L


()


 


C-Reactive Protein,


Quantitative 2.8 mg/dL


(0.00-0.90)  H


 


Pro-B-Type Natriuretic Peptide


  151 pg/mL


(0-125)  H


 


Total Protein


  6.8 G/DL


(6.4-8.2)


 


Albumin


  2.4 G/DL


(3.4-5.0)  L


 


Globulin 4.4 g/dL  


 


Albumin/Globulin Ratio


  0.5 (1.0-2.7)


L


 


Thyroid Stimulating Hormone


(TSH) 1.662 uiU/mL


(0.358-3.740)


 


Free Thyroxine


  0.86 NG/DL


(0.76-1.46)











Current Medications








 Medications


  (Trade)  Dose


 Ordered  Sig/Kadeem


 Route


 PRN Reason  Start Time


 Stop Time Status Last Admin


Dose Admin


 


 Acetaminophen


  (Tylenol)  650 mg  Q4H  PRN


 ORAL


 MILD PAIN (1-3)/TEMP >100.5  8/18/20 11:00


 9/17/20 10:59  8/19/20 01:00


 


 


 Aspirin


  (Ecotrin)  81 mg  DAILY


 ORAL


   8/19/20 09:00


 10/3/20 08:59  8/20/20 09:04


 


 


 Ceftriaxone


 Sodium 1 gm/


 Sodium Chloride  55 ml @ 


 110 mls/hr  Q24H


 IVPB


   8/19/20 13:15


 8/26/20 13:14  8/19/20 13:23


 


 


 Dextrose  1,000 ml @ 


 75 mls/hr  [id number]


 IV


   8/19/20 09:30


 9/18/20 09:29  8/20/20 09:07


 


 


 Hydralazine HCl


  (Apresoline)  25 mg  Q4H  PRN


 ORAL


 bp over 160 syst  8/18/20 15:30


 11/16/20 15:29   


 


 


 Pantoprazole


  (Protonix)  40 mg  EVERY 12  HOURS


 IVP


   8/18/20 21:00


 9/17/20 20:59  8/20/20 09:04


 


 


 Rifaximin


  (Xifaxan)  550 mg  TWICE A  DAY


 ORAL


   8/18/20 18:00


 8/25/20 17:59  8/20/20 09:04


 

















Jeremy Martin MD Aug 20, 2020 10:39

## 2020-08-20 NOTE — NEPHROLOGY PROGRESS NOTE
Assessment/Plan


Problem List:  


(1) MONICA (acute kidney injury)


(2) Electrolyte imbalance


(3) Dehydration


(4) UTI (urinary tract infection)


(5) Sepsis


(6) Encephalopathy


Assessment





Acute renal failure


Dehydration


Hypernatremia, likely due to free water deficit


Sepsis


UTI


Toxic metabolic encephalopathy


Hypoalbuminemia


Plan


August 20: Labs reviewed.  Renal parameters are improved.  Electrolytes within 

normal range.  Continue as is.


August 19: Labs reviewed.  Patient has a Ndiaye catheter.  Serum creatinine down 

to 1.3 from 1.8 on admission.  Serum sodium remains elevated.  Will change IV 

to D5W.  We will keep the blood sugar and blood pressure in check.  Continue 

per consultants.





August 18:


Hold blood pressure medication due to low blood pressure


PRN blood pressure medication for high blood pressure


Today's labs appears to be an error and should be repeated


Slow hydration


Pulmonary toilet


Antibiotics per ID


Monitor renal parameters and electrolyte


Slow hydration


Urine studies





Subjective


ROS Limited/Unobtainable:  Yes





Objective


Objective





Last 24 Hour Vital Signs








  Date Time  Temp Pulse Resp B/P (MAP) Pulse Ox O2 Delivery O2 Flow Rate FiO2


 


8/20/20 09:00      Room Air  


 


8/20/20 07:50 97.5 55 20 116/59 (78) 98   


 


8/20/20 07:36  55      


 


8/20/20 04:00 97.2 66 18 110/63 (79) 99   


 


8/20/20 04:00  53      


 


8/20/20 00:00 97.7 55 16 102/66 (78) 97   


 


8/19/20 23:42  55      


 


8/19/20 21:00      Room Air  


 


8/19/20 20:00 98.8 68 17 103/66 (78) 98   


 


8/19/20 19:34  63      


 


8/19/20 16:00  97      


 


8/19/20 16:00 98.0 59 18 129/76 (93) 98   


 


8/19/20 12:00  55      


 


8/19/20 11:44 97.9 57 18 124/58 (80) 99   

















Intake and Output  


 


 8/19/20 8/20/20





 19:00 07:00


 


Intake Total 825 ml 1287 ml


 


Balance 825 ml 1287 ml


 


  


 


Intake Oral 750 ml 800 ml


 


IV Total 75 ml 487 ml


 


# Bowel Movements 1 











Current Medications








 Medications


  (Trade)  Dose


 Ordered  Sig/Kadeem


 Route


 PRN Reason  Start Time


 Stop Time Status Last Admin


Dose Admin


 


 Acetaminophen


  (Tylenol)  650 mg  Q4H  PRN


 ORAL


 MILD PAIN (1-3)/TEMP >100.5  8/18/20 11:00


 9/17/20 10:59  8/19/20 01:00


 


 


 Aspirin


  (Ecotrin)  81 mg  DAILY


 ORAL


   8/19/20 09:00


 10/3/20 08:59  8/20/20 09:04


 


 


 Ceftriaxone


 Sodium 1 gm/


 Sodium Chloride  55 ml @ 


 110 mls/hr  Q24H


 IVPB


   8/19/20 13:15


 8/26/20 13:14  8/19/20 13:23


 


 


 Dextrose  1,000 ml @ 


 75 mls/hr  [id number]


 IV


   8/19/20 09:30


 9/18/20 09:29  8/20/20 09:07


 


 


 Hydralazine HCl


  (Apresoline)  25 mg  Q4H  PRN


 ORAL


 bp over 160 syst  8/18/20 15:30


 11/16/20 15:29   


 


 


 Pantoprazole


  (Protonix)  40 mg  EVERY 12  HOURS


 IVP


   8/18/20 21:00


 9/17/20 20:59  8/20/20 09:04


 


 


 Rifaximin


  (Xifaxan)  550 mg  TWICE A  DAY


 ORAL


   8/18/20 18:00


 8/25/20 17:59  8/20/20 09:04


 





Laboratory Tests


8/20/20 05:32: 


White Blood Count 7.7, Red Blood Count 2.95L, Hemoglobin 8.9L, Hematocrit 28.1L

, Mean Corpuscular Volume 95, Mean Corpuscular Hemoglobin 30.1, Mean 

Corpuscular Hemoglobin Concent 31.6L, Red Cell Distribution Width 15.7H, 

Platelet Count 99L, Mean Platelet Volume 7.7, Neutrophils (%) (Auto) , 

Lymphocytes (%) (Auto) , Monocytes (%) (Auto) , Eosinophils (%) (Auto) , 

Basophils (%) (Auto) , Differential Total Cells Counted 100, Neutrophils % (

Manual) 62, Lymphocytes % (Manual) 21, Monocytes % (Manual) 6, Eosinophils % (

Manual) 10H, Basophils % (Manual) 1, Band Neutrophils 0, Platelet Estimate 

DecreasedL, Platelet Morphology Normal, Hypochromasia Occasional, Anisocytosis 1

+, Sodium Level 147H, Potassium Level 3.5, Chloride Level 115H, Carbon Dioxide 

Level 21, Anion Gap 11, Blood Urea Nitrogen 28H, Creatinine 1.0, Estimat 

Glomerular Filtration Rate > 60, Glucose Level 89, Uric Acid 5.6, Calcium Level 

7.9L, Phosphorus Level 3.0, Magnesium Level 2.0, Total Bilirubin 0.5, Aspartate 

Amino Transf (AST/SGOT) 81H, Alanine Aminotransferase (ALT/SGPT) 29, Alkaline 

Phosphatase 85, C-Reactive Protein, Quantitative 2.8H, Pro-B-Type Natriuretic 

Peptide 151H, Total Protein 6.8, Albumin 2.4L, Globulin 4.4, Albumin/Globulin 

Ratio 0.5L, Thyroid Stimulating Hormone (TSH) 1.662, Free Thyroxine 0.86


Height (Feet):  5


Height (Inches):  3.00


Weight (Pounds):  141


General Appearance:  no apparent distress


Cardiovascular:  normal rate


Respiratory/Chest:  decreased breath sounds


Abdomen:  soft


Objective


No change











Alvin Alegria MD Aug 20, 2020 10:22

## 2020-08-20 NOTE — GENERAL PROGRESS NOTE
Assessment/Plan


Problem List:  


(1) Renal failure


ICD Codes:  N19 - Unspecified kidney failure


SNOMED:  82445900


Qualifiers:  


   Qualified Codes:  N17.9 - Acute kidney failure, unspecified


(2) Dehydration


ICD Codes:  E86.0 - Dehydration


SNOMED:  51759881


(3) Encephalopathy


ICD Codes:  G93.40 - Encephalopathy, unspecified


SNOMED:  88414348


(4) UTI (urinary tract infection)


ICD Codes:  N39.0 - Urinary tract infection, site not specified


SNOMED:  34285432


Qualifiers:  


   Qualified Codes:  N39.0 - Urinary tract infection, site not specified


(5) Dehydration


ICD Codes:  E86.0 - Dehydration


SNOMED:  58417801


(6) Electrolyte imbalance


ICD Codes:  E87.8 - Other disorders of electrolyte and fluid balance, not 

elsewhere classified


SNOMED:  702674266


(7) Sepsis


ICD Codes:  A41.9 - Sepsis, unspecified organism


SNOMED:  98495799


Qualifiers:  


   Qualified Codes:  A41.9 - Sepsis, unspecified organism; R65.20 - Severe 

sepsis without septic shock; G93.40 - Encephalopathy, unspecified


(8) MONICA (acute kidney injury)


ICD Codes:  N17.9 - Acute kidney failure, unspecified


SNOMED:  0681551, 66875566


Status:  progressing


Assessment/Plan:


lyte abnormality


sepsis


uti


arf


bp improving


clinically improving


abx per id





Subjective


ROS Limited/Unobtainable:  Yes


Allergies:  


Coded Allergies:  


     No Known Allergies (Unverified , 8/19/18)





Objective





Last 24 Hour Vital Signs








  Date Time  Temp Pulse Resp B/P (MAP) Pulse Ox O2 Delivery O2 Flow Rate FiO2


 


8/20/20 16:00 97.7 61 20 112/66 (81) 98   


 


8/20/20 15:51  64      


 


8/20/20 12:00 97.2 62 18 100/54 (69) 97   


 


8/20/20 11:40  62      


 


8/20/20 09:00      Room Air  


 


8/20/20 07:50 97.5 55 20 116/59 (78) 98   


 


8/20/20 07:36  55      


 


8/20/20 04:00 97.2 66 18 110/63 (79) 99   


 


8/20/20 04:00  53      


 


8/20/20 00:00 97.7 55 16 102/66 (78) 97   


 


8/19/20 23:42  55      

















Intake and Output  


 


 8/19/20 8/20/20





 19:00 07:00


 


Intake Total 825 ml 1362 ml


 


Balance 825 ml 1362 ml


 


  


 


Intake Oral 750 ml 800 ml


 


IV Total 75 ml 562 ml


 


# Bowel Movements 1 








Laboratory Tests


8/20/20 05:32: 


White Blood Count 7.7, Red Blood Count 2.95L, Hemoglobin 8.9L, Hematocrit 28.1L

, Mean Corpuscular Volume 95, Mean Corpuscular Hemoglobin 30.1, Mean 

Corpuscular Hemoglobin Concent 31.6L, Red Cell Distribution Width 15.7H, 

Platelet Count 99L, Mean Platelet Volume 7.7, Neutrophils (%) (Auto) , 

Lymphocytes (%) (Auto) , Monocytes (%) (Auto) , Eosinophils (%) (Auto) , 

Basophils (%) (Auto) , Differential Total Cells Counted 100, Neutrophils % (

Manual) 62, Lymphocytes % (Manual) 21, Monocytes % (Manual) 6, Eosinophils % (

Manual) 10H, Basophils % (Manual) 1, Band Neutrophils 0, Platelet Estimate 

DecreasedL, Platelet Morphology Normal, Hypochromasia Occasional, Anisocytosis 1

+, Sodium Level 147H, Potassium Level 3.5, Chloride Level 115H, Carbon Dioxide 

Level 21, Anion Gap 11, Blood Urea Nitrogen 28H, Creatinine 1.0, Estimat 

Glomerular Filtration Rate > 60, Glucose Level 89, Uric Acid 5.6, Calcium Level 

7.9L, Phosphorus Level 3.0, Magnesium Level 2.0, Total Bilirubin 0.5, Aspartate 

Amino Transf (AST/SGOT) 81H, Alanine Aminotransferase (ALT/SGPT) 29, Alkaline 

Phosphatase 85, C-Reactive Protein, Quantitative 2.8H, Pro-B-Type Natriuretic 

Peptide 151H, Total Protein 6.8, Albumin 2.4L, Globulin 4.4, Albumin/Globulin 

Ratio 0.5L, Thyroid Stimulating Hormone (TSH) 1.662, Free Thyroxine 0.86


Height (Feet):  5


Height (Inches):  3.00


Weight (Pounds):  143











Martin Payan MD Aug 20, 2020 22:28

## 2020-08-20 NOTE — PULMONOLOGY PROGRESS NOTE
Subjective


ROS Limited/Unobtainable:  Yes


Interval Events:  COVID 19 test is negative


Constitutional:  Denies: fever


HEENT:  Repors: no symptoms


Respiratory:  Reports: no symptoms


Cardiovascular:  Reports: no symptoms


Gastrointestinal/Abdominal:  Reports: no symptoms


Allergies:  


Coded Allergies:  


     No Known Allergies (Unverified , 8/19/18)





Objective





Last 24 Hour Vital Signs








  Date Time  Temp Pulse Resp B/P (MAP) Pulse Ox O2 Delivery O2 Flow Rate FiO2


 


8/20/20 12:00 97.2 62 18 100/54 (69) 97   


 


8/20/20 09:00      Room Air  


 


8/20/20 07:50 97.5 55 20 116/59 (78) 98   


 


8/20/20 07:36  55      


 


8/20/20 04:00 97.2 66 18 110/63 (79) 99   


 


8/20/20 04:00  53      


 


8/20/20 00:00 97.7 55 16 102/66 (78) 97   


 


8/19/20 23:42  55      


 


8/19/20 21:00      Room Air  


 


8/19/20 20:00 98.8 68 17 103/66 (78) 98   


 


8/19/20 19:34  63      


 


8/19/20 16:00  97      


 


8/19/20 16:00 98.0 59 18 129/76 (93) 98   

















Intake and Output  


 


 8/19/20 8/20/20





 19:00 07:00


 


Intake Total 825 ml 1362 ml


 


Balance 825 ml 1362 ml


 


  


 


Intake Oral 750 ml 800 ml


 


IV Total 75 ml 562 ml


 


# Bowel Movements 1 








General Appearance:  no acute distress


HEENT:  normocephalic


Respiratory:  chest wall non-tender, lungs clear


Cardiovascular:  normal peripheral pulses


Abdomen:  normal bowel sounds





Microbiology








 Date/Time


Source Procedure


Growth Status


 


 


 8/17/20 22:00


Blood Blood Culture - Preliminary


NO GROWTH AFTER 48 HOURS Resulted


 


 8/17/20 21:45


Blood Blood Culture - Preliminary


NO GROWTH AFTER 48 HOURS Resulted


 


 8/17/20 22:30


Nasopharynx SARS-CoV-2 RdRp Gene Assay - Final Complete


 


 8/17/20 22:00


Nasal Nares MRSA Culture - Final


NO METHICILLIN RESISTANT STAPH AUREUS... Complete


 


 8/17/20 22:00


Rectum - Final


NO CARBAPENEM-RESISTANT ENTEROBACTERI... Complete


 


 8/17/20 22:00


Rectum VRE Culture - Final


Enterococcus Faecalis - Vre Complete








Laboratory Tests


8/20/20 05:32: 


White Blood Count 7.7, Red Blood Count 2.95L, Hemoglobin 8.9L, Hematocrit 28.1L

, Mean Corpuscular Volume 95, Mean Corpuscular Hemoglobin 30.1, Mean 

Corpuscular Hemoglobin Concent 31.6L, Red Cell Distribution Width 15.7H, 

Platelet Count 99L, Mean Platelet Volume 7.7, Neutrophils (%) (Auto) , 

Lymphocytes (%) (Auto) , Monocytes (%) (Auto) , Eosinophils (%) (Auto) , 

Basophils (%) (Auto) , Differential Total Cells Counted 100, Neutrophils % (

Manual) 62, Lymphocytes % (Manual) 21, Monocytes % (Manual) 6, Eosinophils % (

Manual) 10H, Basophils % (Manual) 1, Band Neutrophils 0, Platelet Estimate 

DecreasedL, Platelet Morphology Normal, Hypochromasia Occasional, Anisocytosis 1

+, Sodium Level 147H, Potassium Level 3.5, Chloride Level 115H, Carbon Dioxide 

Level 21, Anion Gap 11, Blood Urea Nitrogen 28H, Creatinine 1.0, Estimat 

Glomerular Filtration Rate > 60, Glucose Level 89, Uric Acid 5.6, Calcium Level 

7.9L, Phosphorus Level 3.0, Magnesium Level 2.0, Total Bilirubin 0.5, Aspartate 

Amino Transf (AST/SGOT) 81H, Alanine Aminotransferase (ALT/SGPT) 29, Alkaline 

Phosphatase 85, C-Reactive Protein, Quantitative 2.8H, Pro-B-Type Natriuretic 

Peptide 151H, Total Protein 6.8, Albumin 2.4L, Globulin 4.4, Albumin/Globulin 

Ratio 0.5L, Thyroid Stimulating Hormone (TSH) 1.662, Free Thyroxine 0.86





Current Medications








 Medications


  (Trade)  Dose


 Ordered  Sig/Kadeem


 Route


 PRN Reason  Start Time


 Stop Time Status Last Admin


Dose Admin


 


 Acetaminophen


  (Tylenol)  650 mg  Q4H  PRN


 ORAL


 MILD PAIN (1-3)/TEMP >100.5  8/18/20 11:00


 9/17/20 10:59  8/19/20 01:00


 


 


 Aspirin


  (Ecotrin)  81 mg  DAILY


 ORAL


   8/19/20 09:00


 10/3/20 08:59  8/20/20 09:04


 


 


 Ceftriaxone


 Sodium 1 gm/


 Sodium Chloride  55 ml @ 


 110 mls/hr  Q24H


 IVPB


   8/19/20 13:15


 8/26/20 13:14  8/20/20 12:17


 


 


 Dextrose  1,000 ml @ 


 75 mls/hr  [id number]


 IV


   8/19/20 09:30


 9/18/20 09:29  8/20/20 09:07


 


 


 Hydralazine HCl


  (Apresoline)  25 mg  Q4H  PRN


 ORAL


 bp over 160 syst  8/18/20 15:30


 11/16/20 15:29   


 


 


 Pantoprazole


  (Protonix)  40 mg  EVERY 12  HOURS


 IVP


   8/18/20 21:00


 9/17/20 20:59  8/20/20 09:04


 


 


 Rifaximin


  (Xifaxan)  550 mg  TWICE A  DAY


 ORAL


   8/18/20 18:00


 8/25/20 17:59  8/20/20 09:04


 











Assessment/Plan


Assessment/Plan


IMPRESSION:


1. Ruled out for COVID-19 pneumonia.


2. Nursing home resident.


3. Psoriasis.


4. Dementia.


5. CVA.


6. History of hepatitis C.


7. CHF.





DISCUSSION:  Continue correction of electrolytes and


hypernatremia, fluid hydration.  Is negative for COVID 19; not hypoxic


I will follow as pulmonologist.














  ______________________________________________


  ANA Avalos Omar Syed MD Aug 20, 2020 13:08

## 2020-08-21 NOTE — CONSULTATION
History of Present Illness


General


Date patient seen:  Aug 21, 2020


Reason for Hospitalization:  Flu Like Symptoms





Present Illness


HPI


72 year old male with history of renal insufficiency presented to [facility] ED for 

evaluation of respiratory symptoms.  admitted for care and management.  on 

admission noted to have abnormal labs, malnutrition, and breakdown concerns.  

surgery called to evaluate. developing blisters on left arm.


Allergies:  


Coded Allergies:  


     No Known Allergies (Unverified , 8/19/18)





COVID-19 Screening


Contact w/high risk pt:  No


Experienced COVID-19 symptoms?:  No





Medication History


Scheduled


Ascorbic Acid* (Vitamin C*), 250 MG ORAL TWICE A DAY, (Reported)


Aspirin* (Aspir 81*), 81 MG ORAL DAILY, (Reported)


Atorvastatin Calcium* (Lipitor*), 80 MG ORAL BEDTIME, (Reported)


Benztropine Mesylate* (Benztropine Mesylate*), 1 MG ORAL BEDTIME, (Reported)


Docusate Sodium* (Docusate Sodium*), 250 MG ORAL DAILY, (Reported)


Losartan Potassium* (Losartan Potassium*), 50 MG ORAL DAILY, (Reported)


Metoprolol Tartrate* (Metoprolol Tartrate*), 25 MG ORAL EVERY 12 HOURS, (

Reported)


Multivitamin (Multivitamin), 1 TAB ORAL DAILY, (Reported)


Olanzapine* (Zyprexa*), 5 MG ORAL BID, (Reported)


Pantoprazole* (Protonix*), 40 MG ORAL DAILY, (Reported)


Rifaximin* (Xifaxan*), 550 MG ORAL TWICE A DAY, (Reported)


Vitamin D (Vitamin D3), 1,000 MG ORAL DAILY, (Reported)


Zinc Sulfate (Zinc Sulfate*), 2 CAP ORAL DAILY, (Reported)





Scheduled PRN


Acetaminophen* (Acetaminophen 325MG Tablet*), 650 MG ORAL Q4H PRN for Mild Pain/

Temp > 100.5, (Reported)


Acetaminophen* (Tylenol Extra Strength*), 1,000 MG ORAL Q4H PRN for Moderate 

Pain (Pain Scale 4-6), (Reported)


Bisacodyl (Dulcolax), 10 MG RC DAILY PRN for Constipation, (Reported)


Clonidine Hcl* (Catapres*), 0.1 MG ORAL EVERY 4 HOURS PRN for BP > 160, (

Reported)


Magnesium Hydroxide* (Milk Of Magnesia*), 30 ML ORAL BEDTIME PRN for 

Constipation, (Reported)


Na Phos,M-B/Na Phos,Di-Ba (Fleet Enema), 133 ML RC Q2D PRN for Constipation, (

Reported)





Discontinued Medications


Amlodipine Besylate* (Amlodipine Besylate*), 5 MG ORAL DAILY, (Reported)


   Discontinued Reason: Therapy completed


Lactulose (Lactulose*), 30 ML ORAL BID PRN for Constipation, (Reported)


   Discontinued Reason: Therapy completed


Mag Hydrox/Al Hydrox/Simeth (Maalox Maximum Strength Susp), 15 ML PO EVERY 8 

HOURS PRN for GI UPSET, (Reported)


   Discontinued Reason: Therapy completed


Nitroglycerin 0.4MG table* (Nitroglycerin*), 0.4 MG SL .Q5MIN X 3 DOSES PRN for 

CHEST PAIN, (Reported)


   Discontinued Reason: Therapy completed


Omeprazole (Omeprazole), 40 MG ORAL DAILY, (Reported)


   Discontinued Reason: Therapy completed





Patient History


Limited by:  medical condition


History Provided By:  Medical Record, PMD


Healthcare decision maker





Resuscitation status





Advanced Directive on File








Past Medical/Surgical History


Past Medical/Surgical History:  


(1) Renal failure


(2) Dehydration


(3) Encephalopathy


(4) UTI (urinary tract infection)


(5) Dehydration


(6) Electrolyte imbalance


(7) Sepsis


(8) MONICA (acute kidney injury)


(9) HTN (hypertension)





Review of Systems


Review of Symptoms


General ROS: no weight loss or fever


Psychological ROS: no depression or mood changes, no memory loss


Ophthalmic ROS: no visual changes or eye irritation


ENT ROS: no nasal congestion, hearing loss, dizziness


Allergy and Immunology ROS: no allergic symptoms or urticaria


Hematological and Lymphatic ROS: no swollen glands, unusual bleeding or bruising


Endocrine ROS: no polyuria, polydipsia, weight changes, temperature intolerance


Respiratory ROS: no cough, shortness of breath, or wheezing


Cardiovascular ROS: no chest pain or dyspnea on exertion


Gastrointestinal ROS: denies abdominal pain, bright red blood in stool.


Musculoskeletal ROS: no myalgias or arthralgias


Neurological ROS: no TIA or stroke symptoms


Dermatological ROS: no new or changing skin lesions, rashes or pruritis


limited given medical condition





Physical Exam


Physical Exam


General appearance:  alert, cooperative, no distress, appears stated age


Head:  Normocephalic, without obvious abnormality, atraumatic


Eyes:  conjunctivae/corneas clear. PERRL, EOM's intact. Fundi benign


Throat:  Lips, mucosa, and tongue normal. Teeth and gums normal


Neck:  supple, symmetrical, trachea midline, no adenopathy, thyroid: not 

enlarged, symmetric, no tenderness/mass/nodules, no carotid bruit and no JVD


Lungs:  clear to auscultation bilaterally


Heart:  regular rate and rhythm, S1, S2 normal, no murmur, click, rub or gallop


Abdomen:  soft, non-tender. Bowel sounds normal. No masses,  no organomegaly


Extremities:  extremities left arm edema with blisters 


Pulses:  2+ and symmetric


Skin:  Skin color, texture, turgor normal. No rashes or lesions


Neurologic:  Grossly normal





Last 24 Hour Vital Signs








  Date Time  Temp Pulse Resp B/P (MAP) Pulse Ox O2 Delivery O2 Flow Rate FiO2


 


8/21/20 12:00 98.0 71 19 128/71 (90) 98   


 


8/21/20 11:37  59      


 


8/21/20 09:00      Room Air  


 


8/21/20 08:00 97.7 60 18 138/66 (90) 97   


 


8/21/20 07:46  63      


 


8/21/20 04:00 98.1 55 17 128/73 (91) 99   


 


8/21/20 04:00  51      


 


8/21/20 00:00  57      


 


8/21/20 00:00 97.7 69 18 110/65 (80) 96   


 


8/20/20 21:00      Room Air  


 


8/20/20 20:00  62      


 


8/20/20 20:00 99.0 60 18 98/57 (71) 95   


 


8/20/20 16:00 97.7 61 20 112/66 (81) 98   


 


8/20/20 15:51  64      

















Intake and Output  


 


 8/20/20 8/21/20





 19:00 07:00


 


Intake Total 1480 ml 215 ml


 


Output Total 350 ml 375 ml


 


Balance 1130 ml -160 ml


 


  


 


Intake Oral 600 ml 140 ml


 


IV Total 880 ml 75 ml


 


Output Urine Total 350 ml 375 ml


 


# Bowel Movements  2








Height (Feet):  5


Height (Inches):  3.00


Weight (Pounds):  143


Medications





Current Medications








 Medications


  (Trade)  Dose


 Ordered  Sig/Kadeem


 Route


 PRN Reason  Start Time


 Stop Time Status Last Admin


Dose Admin


 


 Acetaminophen


  (Tylenol)  650 mg  Q4H  PRN


 ORAL


 MILD PAIN (1-3)/TEMP >100.5  8/18/20 11:00


 9/17/20 10:59  8/19/20 01:00


 


 


 Aspirin


  (Ecotrin)  81 mg  DAILY


 ORAL


   8/19/20 09:00


 10/3/20 08:59  8/21/20 08:40


 


 


 Ceftriaxone


 Sodium 1 gm/


 Sodium Chloride  55 ml @ 


 110 mls/hr  Q24H


 IVPB


   8/19/20 13:15


 8/26/20 13:14  8/21/20 13:27


 


 


 Dextrose  1,000 ml @ 


 50 mls/hr  Q20H


 IV


   8/21/20 12:30


 9/20/20 12:29  8/21/20 13:25


 


 


 Hydralazine HCl


  (Apresoline)  25 mg  Q4H  PRN


 ORAL


 bp over 160 syst  8/18/20 15:30


 11/16/20 15:29   


 


 


 Pantoprazole


  (Protonix)  40 mg  EVERY 12  HOURS


 IVP


   8/18/20 21:00


 9/17/20 20:59  8/21/20 08:40


 


 


 Rifaximin


  (Xifaxan)  550 mg  TWICE A  DAY


 ORAL


   8/18/20 18:00


 8/25/20 17:59  8/21/20 08:40


 











Assessment/Plan


Problem List:  


(1) HTN (hypertension)


ICD Codes:  I10 - Essential (primary) hypertension


SNOMED:  16593294


(2) Renal failure


ICD Codes:  N19 - Unspecified kidney failure


SNOMED:  37756260


Qualifiers:  


   Qualified Codes:  N17.9 - Acute kidney failure, unspecified


(3) Dehydration


ICD Codes:  E86.0 - Dehydration


SNOMED:  81371307


(4) Encephalopathy


ICD Codes:  G93.40 - Encephalopathy, unspecified


SNOMED:  51703376


(5) UTI (urinary tract infection)


ICD Codes:  N39.0 - Urinary tract infection, site not specified


SNOMED:  38993713


Qualifiers:  


   Qualified Codes:  N39.0 - Urinary tract infection, site not specified


(6) Dehydration


ICD Codes:  E86.0 - Dehydration


SNOMED:  57340667


(7) Electrolyte imbalance


ICD Codes:  E87.8 - Other disorders of electrolyte and fluid balance, not 

elsewhere classified


SNOMED:  196508215


(8) Sepsis


ICD Codes:  A41.9 - Sepsis, unspecified organism


SNOMED:  11728765


Qualifiers:  


   Qualified Codes:  A41.9 - Sepsis, unspecified organism; R65.20 - Severe 

sepsis without septic shock; G93.40 - Encephalopathy, unspecified


(9) MONICA (acute kidney injury)


ICD Codes:  N17.9 - Acute kidney failure, unspecified


SNOMED:  7400215, 63406491


(10) Blister of left upper arm


Assessment & Plan:  DAILY ESTIMATED NEEDS:


Needs based on Cardiac, pulmonary 66.4kg  


25-30  kcals/kg 


1660-1992  total kcals


1.25-1.5  g protein/kg


  g total protein 


25-30  mL/kg


1660-1992  total fluid mLs





NUTRITION DIAGNOSIS:


Swallowing difficulty r/t dysphagia, h/o CVA, as evidenced by slp eval,


rec puree texture diet.





CURRENT DIET: Regular puree    





PO DIET RECOMMENDATIONS:


W/ good po intake, rec LOW NA diet/ texture per SLP  








ADDITIONAL RECOMMENDATIONS:


1) Maintain calibrated bed scale wts  


2) maintain IVF hydration 


3) Monitor for continued good po intake 


    -> add Ensure 1 bottle qdaily  


4) Rec WC eval/ f/up; add CRISTIN Fruit Punch in 8oz H2O BID  


   for skin integrity


ICD Codes:  S40.822A - Blister (nonthermal) of left upper arm, initial encounter


SNOMED:  08698900











Leon Moreira Aug 21, 2020 14:32

## 2020-08-21 NOTE — CARDIAC ELECTROPHYSIOLOGY PN
Assessment/Plan


Assessment/Plan


1. Elevated troponin at 0.075, likely due to renal failure.  Initial BUN


and creatinine were 48 and 1.8.  Two subsequent troponins are negative and


BUN now is 35 and creatinine 1.3.  EF 65%. Comfortable





2. Bradycardia and right bundle-branch block.  Heart rate was in the 50s,


off any sinus or AV jonathan blocking agent. 





3. Hypertension, on p.r.n. hydralazine.


4. Dehydration with hypernatremia and azotemia.  Patient is on IV fluids.


5. Pneumonia, on ceftriaxone.


6. Anemia, hemoglobin 9.7.





Subjective


Subjective


Alert off restraints in NAD. No CP or SOB





Objective





Last 24 Hour Vital Signs








  Date Time  Temp Pulse Resp B/P (MAP) Pulse Ox O2 Delivery O2 Flow Rate FiO2


 


8/21/20 09:00      Room Air  


 


8/21/20 08:00 97.7 60 18 138/66 (90) 97   


 


8/21/20 07:46  63      


 


8/21/20 04:00 98.1 55 17 128/73 (91) 99   


 


8/21/20 04:00  51      


 


8/21/20 00:00  57      


 


8/21/20 00:00 97.7 69 18 110/65 (80) 96   


 


8/20/20 21:00      Room Air  


 


8/20/20 20:00  62      


 


8/20/20 20:00 99.0 60 18 98/57 (71) 95   


 


8/20/20 16:00 97.7 61 20 112/66 (81) 98   


 


8/20/20 15:51  64      


 


8/20/20 12:00 97.2 62 18 100/54 (69) 97   


 


8/20/20 11:40  62      

















Intake and Output  


 


 8/20/20 8/21/20





 19:00 07:00


 


Intake Total 1480 ml 215 ml


 


Output Total 350 ml 375 ml


 


Balance 1130 ml -160 ml


 


  


 


Intake Oral 600 ml 140 ml


 


IV Total 880 ml 75 ml


 


Output Urine Total 350 ml 375 ml


 


# Bowel Movements  2








Objective





HEAD AND NECK:  Showed no JVD.


LUNGS:  Clear.


CARDIOVASCULAR:  Bradycardic.  S1 and S2 with no gallop or murmur.


ABDOMEN:  Soft.


EXTREMITIES:  No pitting edema.











Levi Otero MD Aug 21, 2020 11:30

## 2020-08-21 NOTE — INFECTIOUS DISEASES PROG NOTE
Assessment/Plan


Assessment/Plan


IMPRESSION:  


Sepsis 


UTI. 


Lactic acidosis, 


Acute renal failure, 


Cirrhosis, 


Portal hypertension, 


Hepatitis C, 


Anemia, 


Dementia. 


Status post splenectomy.





RECOMMENDATION:  


Continue ceftriaxone.  


We will follow up the cultures.





Subjective


ROS Limited/Unobtainable:  Yes


Constitutional:  Denies: fever


Allergies:  


Coded Allergies:  


     No Known Allergies (Unverified , 8/19/18)





Objective





Last 24 Hour Vital Signs








  Date Time  Temp Pulse Resp B/P (MAP) Pulse Ox O2 Delivery O2 Flow Rate FiO2


 


8/21/20 12:00 98.0 71 19 128/71 (90) 98   


 


8/21/20 09:00      Room Air  


 


8/21/20 08:00 97.7 60 18 138/66 (90) 97   


 


8/21/20 07:46  63      


 


8/21/20 04:00 98.1 55 17 128/73 (91) 99   


 


8/21/20 04:00  51      


 


8/21/20 00:00  57      


 


8/21/20 00:00 97.7 69 18 110/65 (80) 96   


 


8/20/20 21:00      Room Air  


 


8/20/20 20:00  62      


 


8/20/20 20:00 99.0 60 18 98/57 (71) 95   


 


8/20/20 16:00 97.7 61 20 112/66 (81) 98   


 


8/20/20 15:51  64      








Height (Feet):  5


Height (Inches):  3.00


Weight (Pounds):  143


General Appearance:  no acute distress


HEENT:  mucous membranes moist


Respiratory/Chest:  lungs clear


Cardiovascular:  normal rate


Abdomen:  soft, non tender


Extremities:  no edema


Neurologic/Psychiatric:  alert, responsive





Current Medications








 Medications


  (Trade)  Dose


 Ordered  Sig/Kadeem


 Route


 PRN Reason  Start Time


 Stop Time Status Last Admin


Dose Admin


 


 Acetaminophen


  (Tylenol)  650 mg  Q4H  PRN


 ORAL


 MILD PAIN (1-3)/TEMP >100.5  8/18/20 11:00


 9/17/20 10:59  8/19/20 01:00


 


 


 Aspirin


  (Ecotrin)  81 mg  DAILY


 ORAL


   8/19/20 09:00


 10/3/20 08:59  8/21/20 08:40


 


 


 Ceftriaxone


 Sodium 1 gm/


 Sodium Chloride  55 ml @ 


 110 mls/hr  Q24H


 IVPB


   8/19/20 13:15


 8/26/20 13:14  8/20/20 12:17


 


 


 Dextrose  1,000 ml @ 


 50 mls/hr  Q20H


 IV


   8/21/20 12:30


 9/20/20 12:29   


 


 


 Hydralazine HCl


  (Apresoline)  25 mg  Q4H  PRN


 ORAL


 bp over 160 syst  8/18/20 15:30


 11/16/20 15:29   


 


 


 Pantoprazole


  (Protonix)  40 mg  EVERY 12  HOURS


 IVP


   8/18/20 21:00


 9/17/20 20:59  8/21/20 08:40


 


 


 Rifaximin


  (Xifaxan)  550 mg  TWICE A  DAY


 ORAL


   8/18/20 18:00


 8/25/20 17:59  8/21/20 08:40


 

















Jeremy Martin MD Aug 21, 2020 12:19

## 2020-08-21 NOTE — NEPHROLOGY PROGRESS NOTE
Assessment/Plan


Problem List:  


(1) MONICA (acute kidney injury)


(2) Electrolyte imbalance


(3) Dehydration


(4) UTI (urinary tract infection)


(5) Sepsis


(6) Encephalopathy


Assessment





Acute renal failure


Dehydration


Hypernatremia, likely due to free water deficit


Sepsis


UTI


Toxic metabolic encephalopathy


Hypoalbuminemia


Plan


August 21: Stable from renal standpoint of view.  Will order labs for tomorrow.

  Continue per consultants.


August 20: Labs reviewed.  Renal parameters are improved.  Electrolytes within 

normal range.  Continue as is.


August 19: Labs reviewed.  Patient has a Ndiaye catheter.  Serum creatinine down 

to 1.3 from 1.8 on admission.  Serum sodium remains elevated.  Will change IV 

to D5W.  We will keep the blood sugar and blood pressure in check.  Continue 

per consultants.





August 18:


Hold blood pressure medication due to low blood pressure


PRN blood pressure medication for high blood pressure


Today's labs appears to be an error and should be repeated


Slow hydration


Pulmonary toilet


Antibiotics per ID


Monitor renal parameters and electrolyte


Slow hydration


Urine studies





Subjective


ROS Limited/Unobtainable:  Yes





Objective


Objective





Last 24 Hour Vital Signs








  Date Time  Temp Pulse Resp B/P (MAP) Pulse Ox O2 Delivery O2 Flow Rate FiO2


 


8/21/20 12:00 98.0 71 19 128/71 (90) 98   


 


8/21/20 09:00      Room Air  


 


8/21/20 08:00 97.7 60 18 138/66 (90) 97   


 


8/21/20 07:46  63      


 


8/21/20 04:00 98.1 55 17 128/73 (91) 99   


 


8/21/20 04:00  51      


 


8/21/20 00:00  57      


 


8/21/20 00:00 97.7 69 18 110/65 (80) 96   


 


8/20/20 21:00      Room Air  


 


8/20/20 20:00  62      


 


8/20/20 20:00 99.0 60 18 98/57 (71) 95   


 


8/20/20 16:00 97.7 61 20 112/66 (81) 98   


 


8/20/20 15:51  64      

















Intake and Output  


 


 8/20/20 8/21/20





 19:00 07:00


 


Intake Total 1480 ml 215 ml


 


Output Total 350 ml 375 ml


 


Balance 1130 ml -160 ml


 


  


 


Intake Oral 600 ml 140 ml


 


IV Total 880 ml 75 ml


 


Output Urine Total 350 ml 375 ml


 


# Bowel Movements  2








No labs drawn today


Height (Feet):  5


Height (Inches):  3.00


Weight (Pounds):  143


General Appearance:  no apparent distress, lethargic


Cardiovascular:  normal rate


Respiratory/Chest:  decreased breath sounds


Abdomen:  soft


Objective


No change











Alvin Alegria MD Aug 21, 2020 12:09

## 2020-08-21 NOTE — PULMONOLOGY PROGRESS NOTE
Subjective


ROS Limited/Unobtainable:  Yes


Interval Events:  COVID 19 test is negative


Constitutional:  Denies: fever


HEENT:  Repors: no symptoms


Respiratory:  Reports: no symptoms


Cardiovascular:  Reports: no symptoms


Gastrointestinal/Abdominal:  Reports: no symptoms


Allergies:  


Coded Allergies:  


     No Known Allergies (Unverified , 8/19/18)





Objective





Last 24 Hour Vital Signs








  Date Time  Temp Pulse Resp B/P (MAP) Pulse Ox O2 Delivery O2 Flow Rate FiO2


 


8/21/20 09:00      Room Air  


 


8/21/20 08:00 97.7 60 18 138/66 (90) 97   


 


8/21/20 07:46  63      


 


8/21/20 04:00 98.1 55 17 128/73 (91) 99   


 


8/21/20 04:00  51      


 


8/21/20 00:00  57      


 


8/21/20 00:00 97.7 69 18 110/65 (80) 96   


 


8/20/20 21:00      Room Air  


 


8/20/20 20:00  62      


 


8/20/20 20:00 99.0 60 18 98/57 (71) 95   


 


8/20/20 16:00 97.7 61 20 112/66 (81) 98   


 


8/20/20 15:51  64      


 


8/20/20 12:00 97.2 62 18 100/54 (69) 97   

















Intake and Output  


 


 8/20/20 8/21/20





 19:00 07:00


 


Intake Total 1480 ml 215 ml


 


Output Total 350 ml 375 ml


 


Balance 1130 ml -160 ml


 


  


 


Intake Oral 600 ml 140 ml


 


IV Total 880 ml 75 ml


 


Output Urine Total 350 ml 375 ml


 


# Bowel Movements  2








General Appearance:  no acute distress


HEENT:  normocephalic


Respiratory:  chest wall non-tender, lungs clear


Cardiovascular:  normal peripheral pulses


Abdomen:  normal bowel sounds





Current Medications








 Medications


  (Trade)  Dose


 Ordered  Sig/Kadeem


 Route


 PRN Reason  Start Time


 Stop Time Status Last Admin


Dose Admin


 


 Acetaminophen


  (Tylenol)  650 mg  Q4H  PRN


 ORAL


 MILD PAIN (1-3)/TEMP >100.5  8/18/20 11:00


 9/17/20 10:59  8/19/20 01:00


 


 


 Aspirin


  (Ecotrin)  81 mg  DAILY


 ORAL


   8/19/20 09:00


 10/3/20 08:59  8/21/20 08:40


 


 


 Ceftriaxone


 Sodium 1 gm/


 Sodium Chloride  55 ml @ 


 110 mls/hr  Q24H


 IVPB


   8/19/20 13:15


 8/26/20 13:14  8/20/20 12:17


 


 


 Dextrose  1,000 ml @ 


 75 mls/hr  [id number]


 IV


   8/19/20 09:30


 9/18/20 09:29  8/20/20 20:47


 


 


 Hydralazine HCl


  (Apresoline)  25 mg  Q4H  PRN


 ORAL


 bp over 160 syst  8/18/20 15:30


 11/16/20 15:29   


 


 


 Pantoprazole


  (Protonix)  40 mg  EVERY 12  HOURS


 IVP


   8/18/20 21:00


 9/17/20 20:59  8/21/20 08:40


 


 


 Rifaximin


  (Xifaxan)  550 mg  TWICE A  DAY


 ORAL


   8/18/20 18:00


 8/25/20 17:59  8/21/20 08:40


 











Assessment/Plan


Assessment/Plan


IMPRESSION:


1. Ruled out for COVID-19 pneumonia.


2. Nursing home resident.


3. Psoriasis.


4. Dementia.


5. CVA.


6. History of hepatitis C.


7. CHF.





DISCUSSION:  Continue correction of electrolytes and


hypernatremia, fluid hydration.  Is negative for COVID 19; not hypoxic


I will follow as pulmonologist.














  ______________________________________________


  ANA Avalos Omar Syed MD Aug 21, 2020 11:43

## 2020-08-22 NOTE — SURGERY PROGRESS NOTE
Surgery Progress Note


Objective





Last 24 Hour Vital Signs








  Date Time  Temp Pulse Resp B/P (MAP) Pulse Ox O2 Delivery O2 Flow Rate FiO2


 


8/22/20 08:00 97.5 57 18 111/50 (70) 98   


 


8/22/20 04:00 98.1 62 20 114/60 (78) 99   


 


8/22/20 04:00  59      


 


8/22/20 00:00  59      


 


8/22/20 00:00 97.9 63 20 107/68 (81) 97   


 


8/21/20 21:00      Room Air  


 


8/21/20 20:00 99.0 61 20 114/62 (79) 96   


 


8/21/20 20:00  61      


 


8/21/20 16:00 98.4 69 20 132/74 (93) 99   


 


8/21/20 15:54  60      


 


8/21/20 12:00 98.0 71 19 128/71 (90) 98   


 


8/21/20 11:37  59      








I&O











Intake and Output  


 


 8/21/20 8/22/20





 19:00 07:00


 


Intake Total 1330 ml 100 ml


 


Output Total 450 ml 1200 ml


 


Balance 880 ml -1100 ml


 


  


 


Intake Oral 600 ml 100 ml


 


IV Total 730 ml 


 


Output Urine Total 450 ml 1200 ml


 


# Bowel Movements  1








Dressing:  other


Wound:  other


Cardiovascular:  RSR


Respiratory:  decreased breath sounds


Abdomen:  soft, non-tender, present bowel sounds


Extremities:  no tenderness, no cyanosis





Laboratory Tests








Test


  8/22/20


08:25


 


White Blood Count


  6.5 K/UL


(4.8-10.8)


 


Red Blood Count


  3.20 M/UL


(4.70-6.10)  L


 


Hemoglobin


  9.7 G/DL


(14.2-18.0)  L


 


Hematocrit


  29.8 %


(42.0-52.0)  L


 


Mean Corpuscular Volume 93 FL (80-99)  


 


Mean Corpuscular Hemoglobin


  30.2 PG


(27.0-31.0)


 


Mean Corpuscular Hemoglobin


Concent 32.5 G/DL


(32.0-36.0)


 


Red Cell Distribution Width


  14.8 %


(11.6-14.8)


 


Platelet Count


  99 K/UL


(150-450)  L


 


Mean Platelet Volume


  7.1 FL


(6.5-10.1)


 


Neutrophils (%) (Auto)


  % (45.0-75.0)


 


 


Lymphocytes (%) (Auto)


  % (20.0-45.0)


 


 


Monocytes (%) (Auto)  % (1.0-10.0)  


 


Eosinophils (%) (Auto)  % (0.0-3.0)  


 


Basophils (%) (Auto)  % (0.0-2.0)  


 


Neutrophils % (Manual) Pending  


 


Lymphocytes % (Manual) Pending  


 


Platelet Estimate Pending  


 


Platelet Morphology Pending  


 


Sodium Level


  140 MMOL/L


(136-145)


 


Potassium Level


  4.2 MMOL/L


(3.5-5.1)


 


Chloride Level


  110 MMOL/L


()  H


 


Carbon Dioxide Level


  21 MMOL/L


(21-32)


 


Anion Gap


  9 mmol/L


(5-15)


 


Blood Urea Nitrogen


  14 mg/dL


(7-18)


 


Creatinine


  0.9 MG/DL


(0.55-1.30)


 


Estimat Glomerular Filtration


Rate > 60 mL/min


(>60)


 


Glucose Level


  91 MG/DL


()


 


Calcium Level


  8.3 MG/DL


(8.5-10.1)  L


 


Phosphorus Level


  3.3 MG/DL


(2.5-4.9)


 


Magnesium Level


  1.9 MG/DL


(1.8-2.4)


 


Total Bilirubin


  0.5 MG/DL


(0.2-1.0)


 


Aspartate Amino Transf


(AST/SGOT) 59 U/L (15-37)


H


 


Alanine Aminotransferase


(ALT/SGPT) 40 U/L (12-78)


 


 


Alkaline Phosphatase


  86 U/L


()


 


Total Protein


  6.7 G/DL


(6.4-8.2)


 


Albumin


  2.3 G/DL


(3.4-5.0)  L


 


Globulin 4.4 g/dL  


 


Albumin/Globulin Ratio


  0.5 (1.0-2.7)


L











Plan


Problems:  


(1) HTN (hypertension)


(2) Renal failure


(3) Dehydration


Assessment & Plan:  CURRENT DIET TEXTURE IS LEAST RESTRICTIVE AND AT THE TIME 

WITHOUT OVERT 


S/S OF ASPIRATION.





RECOMMENDATIONS;


1.  CONTINUE CURRENT DIET


2.  CONTINUE WITH SIMPLE COMMUNICATION TIPS TO INCREASE PATIENTS


    PARTICIPATION IN HIS MEDICAL CARE.


3.  ST TO FOLLOW FOR COMMUNICATION/DYSPHAGIA TX 





(4) Encephalopathy


(5) UTI (urinary tract infection)


(6) Dehydration


(7) Electrolyte imbalance


(8) Sepsis


(9) MONICA (acute kidney injury)


(10) Blister of left upper arm


Assessment & Plan:  DAILY ESTIMATED NEEDS:


Needs based on Cardiac, pulmonary 66.4kg  


25-30  kcals/kg 


1660-1992  total kcals


1.25-1.5  g protein/kg


  g total protein 


25-30  mL/kg


1660-1992  total fluid mLs





NUTRITION DIAGNOSIS:


Swallowing difficulty r/t dysphagia, h/o CVA, as evidenced by slp eval,


rec puree texture diet.





CURRENT DIET: Regular puree    





PO DIET RECOMMENDATIONS:


W/ good po intake, rec LOW NA diet/ texture per SLP  








ADDITIONAL RECOMMENDATIONS:


1) Maintain calibrated bed scale wts  


2) maintain IVF hydration 


3) Monitor for continued good po intake 


    -> add Ensure 1 bottle qdaily  


4) Rec WC eval/ f/up; add CRISTIN Fruit Punch in 8oz H2O BID  


   for skin integrity  














Leon Moreira Aug 22, 2020 09:33

## 2020-08-22 NOTE — CARDIAC ELECTROPHYSIOLOGY PN
Assessment/Plan


Assessment/Plan


1. Elevated troponin at 0.075, likely due to renal failure.  Follow up 

troponins are negative  


    Initial BUN and creatinine were 48 and 1.8. 


     BUN/ Cr now normal  EF 65%. Comfortable





2. Bradycardia and right bundle-branch block.  


      Heart rate was in the 50s, off any sinus or AV jonathan blocking agent. 





3. Hypertension, on p.r.n. hydralazine.


4. Dehydration with hypernatremia and azotemia.  Patient is on IV fluids.


5. Pneumonia, on ceftriaxone.


6. Anemia, hemoglobin 9.7.





MARTY RN





Subjective


Subjective


Alert in NAD. No CP or SOB. In SR on tele





Objective





Last 24 Hour Vital Signs








  Date Time  Temp Pulse Resp B/P (MAP) Pulse Ox O2 Delivery O2 Flow Rate FiO2


 


8/22/20 09:00      Room Air  


 


8/22/20 08:43  59      


 


8/22/20 08:00 97.5 57 18 111/50 (70) 98   


 


8/22/20 04:00 98.1 62 20 114/60 (78) 99   


 


8/22/20 04:00  59      


 


8/22/20 00:00  59      


 


8/22/20 00:00 97.9 63 20 107/68 (81) 97   


 


8/21/20 21:00      Room Air  


 


8/21/20 20:00 99.0 61 20 114/62 (79) 96   


 


8/21/20 20:00  61      


 


8/21/20 16:00 98.4 69 20 132/74 (93) 99   


 


8/21/20 15:54  60      

















Intake and Output  


 


 8/21/20 8/22/20





 19:00 07:00


 


Intake Total 1330 ml 100 ml


 


Output Total 450 ml 1200 ml


 


Balance 880 ml -1100 ml


 


  


 


Intake Oral 600 ml 100 ml


 


IV Total 730 ml 


 


Output Urine Total 450 ml 1200 ml


 


# Bowel Movements  1











Laboratory Tests








Test


  8/22/20


08:25


 


White Blood Count


  6.5 K/UL


(4.8-10.8)


 


Red Blood Count


  3.20 M/UL


(4.70-6.10)  L


 


Hemoglobin


  9.7 G/DL


(14.2-18.0)  L


 


Hematocrit


  29.8 %


(42.0-52.0)  L


 


Mean Corpuscular Volume 93 FL (80-99)  


 


Mean Corpuscular Hemoglobin


  30.2 PG


(27.0-31.0)


 


Mean Corpuscular Hemoglobin


Concent 32.5 G/DL


(32.0-36.0)


 


Red Cell Distribution Width


  14.8 %


(11.6-14.8)


 


Platelet Count


  99 K/UL


(150-450)  L


 


Mean Platelet Volume


  7.1 FL


(6.5-10.1)


 


Neutrophils (%) (Auto)


  % (45.0-75.0)


 


 


Lymphocytes (%) (Auto)


  % (20.0-45.0)


 


 


Monocytes (%) (Auto)  % (1.0-10.0)  


 


Eosinophils (%) (Auto)  % (0.0-3.0)  


 


Basophils (%) (Auto)  % (0.0-2.0)  


 


Differential Total Cells


Counted 100  


 


 


Neutrophils % (Manual) 57 % (45-75)  


 


Lymphocytes % (Manual) 25 % (20-45)  


 


Monocytes % (Manual) 10 % (1-10)  


 


Eosinophils % (Manual) 4 % (0-3)  H


 


Basophils % (Manual) 4 % (0-2)  H


 


Band Neutrophils 0 % (0-8)  


 


Platelet Estimate Decreased  L


 


Platelet Morphology Normal  


 


Hypochromasia 1+  


 


Anisocytosis 1+  


 


Sodium Level


  140 MMOL/L


(136-145)


 


Potassium Level


  4.2 MMOL/L


(3.5-5.1)


 


Chloride Level


  110 MMOL/L


()  H


 


Carbon Dioxide Level


  21 MMOL/L


(21-32)


 


Anion Gap


  9 mmol/L


(5-15)


 


Blood Urea Nitrogen


  14 mg/dL


(7-18)


 


Creatinine


  0.9 MG/DL


(0.55-1.30)


 


Estimat Glomerular Filtration


Rate > 60 mL/min


(>60)


 


Glucose Level


  91 MG/DL


()


 


Calcium Level


  8.3 MG/DL


(8.5-10.1)  L


 


Phosphorus Level


  3.3 MG/DL


(2.5-4.9)


 


Magnesium Level


  1.9 MG/DL


(1.8-2.4)


 


Total Bilirubin


  0.5 MG/DL


(0.2-1.0)


 


Aspartate Amino Transf


(AST/SGOT) 59 U/L (15-37)


H


 


Alanine Aminotransferase


(ALT/SGPT) 40 U/L (12-78)


 


 


Alkaline Phosphatase


  86 U/L


()


 


Total Protein


  6.7 G/DL


(6.4-8.2)


 


Albumin


  2.3 G/DL


(3.4-5.0)  L


 


Globulin 4.4 g/dL  


 


Albumin/Globulin Ratio


  0.5 (1.0-2.7)


L








Objective





HEAD AND NECK:  Showed no JVD.


LUNGS:  Clear.


CARDIOVASCULAR:  Bradycardic.  S1 and S2 with no gallop or murmur.


ABDOMEN:  Soft.


EXTREMITIES:  No pitting edema.











Levi Otero MD Aug 22, 2020 13:46

## 2020-08-22 NOTE — NEPHROLOGY PROGRESS NOTE
Assessment/Plan


Problem List:  


(1) MONICA (acute kidney injury)


(2) Electrolyte imbalance


(3) Dehydration


(4) UTI (urinary tract infection)


(5) Sepsis


(6) Encephalopathy


Assessment





Acute renal failure


Dehydration


Hypernatremia, likely due to free water deficit


Sepsis


UTI


Toxic metabolic encephalopathy


Hypoalbuminemia


Plan


August 22: Stable from renal standpoint of view.  Continue per consultants.


August 21: Stable from renal standpoint of view.  Will order labs for tomorrow.

  Continue per consultants.


August 20: Labs reviewed.  Renal parameters are improved.  Electrolytes within 

normal range.  Continue as is.


August 19: Labs reviewed.  Patient has a Ndiaye catheter.  Serum creatinine down 

to 1.3 from 1.8 on admission.  Serum sodium remains elevated.  Will change IV 

to D5W.  We will keep the blood sugar and blood pressure in check.  Continue 

per consultants.





August 18:


Hold blood pressure medication due to low blood pressure


PRN blood pressure medication for high blood pressure


Today's labs appears to be an error and should be repeated


Slow hydration


Pulmonary toilet


Antibiotics per ID


Monitor renal parameters and electrolyte


Slow hydration


Urine studies





Subjective


ROS Limited/Unobtainable:  Yes





Objective


Objective





Last 24 Hour Vital Signs








  Date Time  Temp Pulse Resp B/P (MAP) Pulse Ox O2 Delivery O2 Flow Rate FiO2


 


8/22/20 09:00      Room Air  


 


8/22/20 08:43  59      


 


8/22/20 08:00 97.5 57 18 111/50 (70) 98   


 


8/22/20 04:00 98.1 62 20 114/60 (78) 99   


 


8/22/20 04:00  59      


 


8/22/20 00:00  59      


 


8/22/20 00:00 97.9 63 20 107/68 (81) 97   


 


8/21/20 21:00      Room Air  


 


8/21/20 20:00 99.0 61 20 114/62 (79) 96   


 


8/21/20 20:00  61      


 


8/21/20 16:00 98.4 69 20 132/74 (93) 99   


 


8/21/20 15:54  60      

















Intake and Output  


 


 8/21/20 8/22/20





 19:00 07:00


 


Intake Total 1330 ml 100 ml


 


Output Total 450 ml 1200 ml


 


Balance 880 ml -1100 ml


 


  


 


Intake Oral 600 ml 100 ml


 


IV Total 730 ml 


 


Output Urine Total 450 ml 1200 ml


 


# Bowel Movements  1








Laboratory Tests


8/22/20 08:25: 


White Blood Count 6.5, Red Blood Count 3.20L, Hemoglobin 9.7L, Hematocrit 29.8L

, Mean Corpuscular Volume 93, Mean Corpuscular Hemoglobin 30.2, Mean 

Corpuscular Hemoglobin Concent 32.5, Red Cell Distribution Width 14.8, Platelet 

Count 99L, Mean Platelet Volume 7.1, Neutrophils (%) (Auto) , Lymphocytes (%) (

Auto) , Monocytes (%) (Auto) , Eosinophils (%) (Auto) , Basophils (%) (Auto) , 

Differential Total Cells Counted 100, Neutrophils % (Manual) 57, Lymphocytes % (

Manual) 25, Monocytes % (Manual) 10, Eosinophils % (Manual) 4H, Basophils % (

Manual) 4H, Band Neutrophils 0, Platelet Estimate DecreasedL, Platelet 

Morphology Normal, Hypochromasia 1+, Anisocytosis 1+, Sodium Level 140, 

Potassium Level 4.2, Chloride Level 110H, Carbon Dioxide Level 21, Anion Gap 9, 

Blood Urea Nitrogen 14, Creatinine 0.9, Estimat Glomerular Filtration Rate > 60

, Glucose Level 91, Calcium Level 8.3L, Phosphorus Level 3.3, Magnesium Level 

1.9, Total Bilirubin 0.5, Aspartate Amino Transf (AST/SGOT) 59H, Alanine 

Aminotransferase (ALT/SGPT) 40, Alkaline Phosphatase 86, Total Protein 6.7, 

Albumin 2.3L, Globulin 4.4, Albumin/Globulin Ratio 0.5L


Height (Feet):  5


Height (Inches):  3.00


Weight (Pounds):  150


Cardiovascular:  normal rate


Respiratory/Chest:  decreased breath sounds


Abdomen:  soft


Objective


No change











Alvin Alegria MD Aug 22, 2020 13:30

## 2020-08-22 NOTE — PULMONOLOGY PROGRESS NOTE
Subjective


ROS Limited/Unobtainable:  Yes


Interval Events:  COVID 19 test is negative


Constitutional:  Denies: fever


HEENT:  Repors: no symptoms


Respiratory:  Reports: no symptoms


Cardiovascular:  Reports: no symptoms


Gastrointestinal/Abdominal:  Reports: no symptoms


Allergies:  


Coded Allergies:  


     No Known Allergies (Unverified , 8/19/18)





Objective





Last 24 Hour Vital Signs








  Date Time  Temp Pulse Resp B/P (MAP) Pulse Ox O2 Delivery O2 Flow Rate FiO2


 


8/22/20 08:00 97.5 57 18 111/50 (70) 98   


 


8/22/20 04:00 98.1 62 20 114/60 (78) 99   


 


8/22/20 04:00  59      


 


8/22/20 00:00  59      


 


8/22/20 00:00 97.9 63 20 107/68 (81) 97   


 


8/21/20 21:00      Room Air  


 


8/21/20 20:00 99.0 61 20 114/62 (79) 96   


 


8/21/20 20:00  61      


 


8/21/20 16:00 98.4 69 20 132/74 (93) 99   


 


8/21/20 15:54  60      


 


8/21/20 12:00 98.0 71 19 128/71 (90) 98   


 


8/21/20 11:37  59      


 


8/21/20 09:00      Room Air  

















Intake and Output  


 


 8/21/20 8/22/20





 19:00 07:00


 


Intake Total 1330 ml 100 ml


 


Output Total 450 ml 1200 ml


 


Balance 880 ml -1100 ml


 


  


 


Intake Oral 600 ml 100 ml


 


IV Total 730 ml 


 


Output Urine Total 450 ml 1200 ml


 


# Bowel Movements  1








General Appearance:  no acute distress


HEENT:  normocephalic


Respiratory:  chest wall non-tender, lungs clear


Cardiovascular:  normal peripheral pulses


Abdomen:  normal bowel sounds


Laboratory Tests


8/22/20 08:25: 


White Blood Count [Pending], Red Blood Count [Pending], Hemoglobin [Pending], 

Hematocrit [Pending], Mean Corpuscular Volume [Pending], Mean Corpuscular 

Hemoglobin [Pending], Mean Corpuscular Hemoglobin Concent [Pending], Red Cell 

Distribution Width [Pending], Platelet Count [Pending], Mean Platelet Volume [

Pending], Neutrophils (%) (Auto) [Pending], Lymphocytes (%) (Auto) [Pending], 

Monocytes (%) (Auto) [Pending], Eosinophils (%) (Auto) [Pending], Basophils (%) 

(Auto) [Pending], Sodium Level [Pending], Potassium Level [Pending], Chloride 

Level [Pending], Carbon Dioxide Level [Pending], Blood Urea Nitrogen [Pending], 

Creatinine [Pending], Estimat Glomerular Filtration Rate [Pending], Glucose 

Level [Pending], Calcium Level [Pending], Phosphorus Level [Pending], Magnesium 

Level [Pending], Total Bilirubin [Pending], Aspartate Amino Transf (AST/SGOT) [

Pending], Alanine Aminotransferase (ALT/SGPT) [Pending], Alkaline Phosphatase [

Pending], Total Protein [Pending], Albumin [Pending], Globulin [Pending]





Current Medications








 Medications


  (Trade)  Dose


 Ordered  Sig/Kadeem


 Route


 PRN Reason  Start Time


 Stop Time Status Last Admin


Dose Admin


 


 Acetaminophen


  (Tylenol)  650 mg  Q4H  PRN


 ORAL


 MILD PAIN (1-3)/TEMP >100.5  8/18/20 11:00


 9/17/20 10:59  8/19/20 01:00


 


 


 Aspirin


  (Ecotrin)  81 mg  DAILY


 ORAL


   8/19/20 09:00


 10/3/20 08:59  8/21/20 08:40


 


 


 Ceftriaxone


 Sodium 1 gm/


 Sodium Chloride  55 ml @ 


 110 mls/hr  Q24H


 IVPB


   8/19/20 13:15


 8/26/20 13:14  8/21/20 13:27


 


 


 Dextrose  1,000 ml @ 


 50 mls/hr  Q20H


 IV


   8/21/20 12:30


 9/20/20 12:29  8/21/20 13:25


 


 


 Hydralazine HCl


  (Apresoline)  25 mg  Q4H  PRN


 ORAL


 bp over 160 syst  8/18/20 15:30


 11/16/20 15:29   


 


 


 Pantoprazole


  (Protonix)  40 mg  EVERY 12  HOURS


 IVP


   8/18/20 21:00


 9/17/20 20:59  8/21/20 21:06


 


 


 Rifaximin


  (Xifaxan)  550 mg  TWICE A  DAY


 ORAL


   8/18/20 18:00


 8/25/20 17:59  8/21/20 17:11


 











Assessment/Plan


Assessment/Plan


IMPRESSION:


1. Ruled out for COVID-19 pneumonia.


2. Nursing home resident.


3. Psoriasis.


4. Dementia.


5. CVA.


6. History of hepatitis C.


7. CHF.





DISCUSSION:  Continue correction of electrolytes and


hypernatremia, fluid hydration.  Is negative for COVID 19; not hypoxic


I will follow as pulmonologist.














  ______________________________________________


  ANA Avalos Omar Syed MD Aug 22, 2020 08:45

## 2020-08-22 NOTE — GENERAL PROGRESS NOTE
Assessment/Plan


Problem List:  


(1) Renal failure


ICD Codes:  N19 - Unspecified kidney failure


SNOMED:  60888401


Qualifiers:  


   Qualified Codes:  N17.9 - Acute kidney failure, unspecified


(2) Dehydration


ICD Codes:  E86.0 - Dehydration


SNOMED:  00854088


(3) Encephalopathy


ICD Codes:  G93.40 - Encephalopathy, unspecified


SNOMED:  01937067


(4) UTI (urinary tract infection)


ICD Codes:  N39.0 - Urinary tract infection, site not specified


SNOMED:  76177774


Qualifiers:  


   Qualified Codes:  N39.0 - Urinary tract infection, site not specified


(5) Dehydration


ICD Codes:  E86.0 - Dehydration


SNOMED:  64609062


(6) Electrolyte imbalance


ICD Codes:  E87.8 - Other disorders of electrolyte and fluid balance, not 

elsewhere classified


SNOMED:  638374312


(7) Sepsis


ICD Codes:  A41.9 - Sepsis, unspecified organism


SNOMED:  83899583


Qualifiers:  


   Qualified Codes:  A41.9 - Sepsis, unspecified organism; R65.20 - Severe 

sepsis without septic shock; G93.40 - Encephalopathy, unspecified


(8) MONICA (acute kidney injury)


ICD Codes:  N17.9 - Acute kidney failure, unspecified


SNOMED:  5560870, 66989953


Status:  progressing


Assessment/Plan:


lyte abnormality


sepsis


uti


afebrile


lyte abnormality


arf'


hypernatremia improving


abx per id





Subjective


ROS Limited/Unobtainable:  Yes


Allergies:  


Coded Allergies:  


     No Known Allergies (Unverified , 8/19/18)





Objective





Last 24 Hour Vital Signs








  Date Time  Temp Pulse Resp B/P (MAP) Pulse Ox O2 Delivery O2 Flow Rate FiO2


 


8/22/20 21:00      Room Air  


 


8/22/20 20:00 97.9 82 18 122/77 (92) 97   


 


8/22/20 16:00 98.1 74 18 111/72 (85) 96   


 


8/22/20 12:00 97.7 62 20 109/54 (72) 97   


 


8/22/20 11:32  62      


 


8/22/20 09:00      Room Air  


 


8/22/20 08:43  59      


 


8/22/20 08:00 97.5 57 18 111/50 (70) 98   


 


8/22/20 04:00 98.1 62 20 114/60 (78) 99   


 


8/22/20 04:00  59      


 


8/22/20 00:00  59      


 


8/22/20 00:00 97.9 63 20 107/68 (81) 97   

















Intake and Output  


 


 8/21/20 8/22/20





 19:00 07:00


 


Intake Total 1330 ml 100 ml


 


Output Total 450 ml 1200 ml


 


Balance 880 ml -1100 ml


 


  


 


Intake Oral 600 ml 100 ml


 


IV Total 730 ml 


 


Output Urine Total 450 ml 1200 ml


 


# Bowel Movements  1








Laboratory Tests


8/22/20 08:25: 


White Blood Count 6.5, Red Blood Count 3.20L, Hemoglobin 9.7L, Hematocrit 29.8L

, Mean Corpuscular Volume 93, Mean Corpuscular Hemoglobin 30.2, Mean 

Corpuscular Hemoglobin Concent 32.5, Red Cell Distribution Width 14.8, Platelet 

Count 99L, Mean Platelet Volume 7.1, Neutrophils (%) (Auto) , Lymphocytes (%) (

Auto) , Monocytes (%) (Auto) , Eosinophils (%) (Auto) , Basophils (%) (Auto) , 

Differential Total Cells Counted 100, Neutrophils % (Manual) 57, Lymphocytes % (

Manual) 25, Monocytes % (Manual) 10, Eosinophils % (Manual) 4H, Basophils % (

Manual) 4H, Band Neutrophils 0, Platelet Estimate DecreasedL, Platelet 

Morphology Normal, Hypochromasia 1+, Anisocytosis 1+, Sodium Level 140, 

Potassium Level 4.2, Chloride Level 110H, Carbon Dioxide Level 21, Anion Gap 9, 

Blood Urea Nitrogen 14, Creatinine 0.9, Estimat Glomerular Filtration Rate > 60

, Glucose Level 91, Calcium Level 8.3L, Phosphorus Level 3.3, Magnesium Level 

1.9, Total Bilirubin 0.5, Aspartate Amino Transf (AST/SGOT) 59H, Alanine 

Aminotransferase (ALT/SGPT) 40, Alkaline Phosphatase 86, Total Protein 6.7, 

Albumin 2.3L, Globulin 4.4, Albumin/Globulin Ratio 0.5L


Height (Feet):  5


Height (Inches):  3.00


Weight (Pounds):  150











Martin Payan MD Aug 22, 2020 21:47

## 2020-08-23 NOTE — NEPHROLOGY PROGRESS NOTE
Assessment/Plan


Problem List:  


(1) MONICA (acute kidney injury)


(2) Electrolyte imbalance


(3) Dehydration


(4) UTI (urinary tract infection)


(5) Sepsis


(6) Encephalopathy


Assessment





Acute renal failure


Dehydration


Hypernatremia, likely due to free water deficit


Sepsis


UTI


Toxic metabolic encephalopathy


Hypoalbuminemia


Plan


August 23: No labs drawn today.  Stable from renal standpoint of view.


August 22: Stable from renal standpoint of view.  Continue per consultants.


August 21: Stable from renal standpoint of view.  Will order labs for tomorrow.

  Continue per consultants.


August 20: Labs reviewed.  Renal parameters are improved.  Electrolytes within 

normal range.  Continue as is.


August 19: Labs reviewed.  Patient has a Ndiaye catheter.  Serum creatinine down 

to 1.3 from 1.8 on admission.  Serum sodium remains elevated.  Will change IV 

to D5W.  We will keep the blood sugar and blood pressure in check.  Continue 

per consultants.





August 18:


Hold blood pressure medication due to low blood pressure


PRN blood pressure medication for high blood pressure


Today's labs appears to be an error and should be repeated


Slow hydration


Pulmonary toilet


Antibiotics per ID


Monitor renal parameters and electrolyte


Slow hydration


Urine studies





Subjective


ROS Limited/Unobtainable:  Yes





Objective


Objective





Last 24 Hour Vital Signs








  Date Time  Temp Pulse Resp B/P (MAP) Pulse Ox O2 Delivery O2 Flow Rate FiO2


 


8/23/20 09:00      Room Air  


 


8/23/20 08:00 97.5 61 17 126/76 (93) 100   


 


8/23/20 04:00 96.7 68 18 129/78 (95) 97   


 


8/23/20 00:00 97.7 72 18 110/69 (83) 97   


 


8/22/20 21:00      Room Air  


 


8/22/20 20:00 97.9 82 18 122/77 (92) 97   


 


8/22/20 16:00 98.1 74 18 111/72 (85) 96   


 


8/22/20 12:00 97.7 62 20 109/54 (72) 97   

















Intake and Output  


 


 8/22/20 8/23/20





 19:00 07:00


 


Intake Total 515 ml 450 ml


 


Output Total  1500 ml


 


Balance 515 ml -1050 ml


 


  


 


Intake Oral 240 ml 


 


IV Total 75 ml 450 ml


 


Other 200 ml 


 


Output Urine Total  1500 ml


 


# Bowel Movements  1








No labs drawn today.


Height (Feet):  5


Height (Inches):  3.00


Weight (Pounds):  148


General Appearance:  no apparent distress, lethargic


Cardiovascular:  normal rate


Respiratory/Chest:  decreased breath sounds


Abdomen:  soft


Objective


No change











Alvin Alegria MD Aug 23, 2020 11:48

## 2020-08-23 NOTE — SURGERY PROGRESS NOTE
Surgery Progress Note


Subjective


Additional Comments


no acute events


comfortable


stable


no n/v/f/c


labs noted





Objective





Last 24 Hour Vital Signs








  Date Time  Temp Pulse Resp B/P (MAP) Pulse Ox O2 Delivery O2 Flow Rate FiO2


 


8/23/20 09:00      Room Air  


 


8/23/20 08:00 97.5 61 17 126/76 (93) 100   


 


8/23/20 04:00 96.7 68 18 129/78 (95) 97   


 


8/23/20 00:00 97.7 72 18 110/69 (83) 97   


 


8/22/20 21:00      Room Air  


 


8/22/20 20:00 97.9 82 18 122/77 (92) 97   


 


8/22/20 16:00 98.1 74 18 111/72 (85) 96   


 


8/22/20 12:00 97.7 62 20 109/54 (72) 97   


 


8/22/20 11:32  62      








I&O











Intake and Output  


 


 8/22/20 8/23/20





 19:00 07:00


 


Intake Total 515 ml 450 ml


 


Output Total  1500 ml


 


Balance 515 ml -1050 ml


 


  


 


Intake Oral 240 ml 


 


IV Total 75 ml 450 ml


 


Other 200 ml 


 


Output Urine Total  1500 ml


 


# Bowel Movements  1








Dressing:  saturated


Cardiovascular:  RSR


Respiratory:  decreased breath sounds


Abdomen:  soft, non-tender, present bowel sounds


Extremities:  no tenderness, no cyanosis





Plan


Problems:  


(1) HTN (hypertension)


(2) Renal failure


(3) Dehydration


Assessment & Plan:  CURRENT DIET TEXTURE IS LEAST RESTRICTIVE AND AT THE TIME 

WITHOUT OVERT 


S/S OF ASPIRATION.





RECOMMENDATIONS;


1.  CONTINUE CURRENT DIET


2.  CONTINUE WITH SIMPLE COMMUNICATION TIPS TO INCREASE PATIENTS


    PARTICIPATION IN HIS MEDICAL CARE.


3.  ST TO FOLLOW FOR COMMUNICATION/DYSPHAGIA TX 





(4) Encephalopathy


(5) UTI (urinary tract infection)


(6) Dehydration


(7) Electrolyte imbalance


(8) Sepsis


(9) MONICA (acute kidney injury)


(10) Blister of left upper arm


Assessment & Plan:  DAILY ESTIMATED NEEDS:


Needs based on Cardiac, pulmonary 66.4kg  


25-30  kcals/kg 


1660-1992  total kcals


1.25-1.5  g protein/kg


  g total protein 


25-30  mL/kg


1660-1992  total fluid mLs





NUTRITION DIAGNOSIS:


Swallowing difficulty r/t dysphagia, h/o CVA, as evidenced by slp jaimie rizzo puree texture diet.





CURRENT DIET: Regular puree    





PO DIET RECOMMENDATIONS:


W/ good po intake, rec LOW NA diet/ texture per SLP  








ADDITIONAL RECOMMENDATIONS:


1) Maintain calibrated bed scale wts  


2) maintain IVF hydration 


3) Monitor for continued good po intake 


    -> add Ensure 1 bottle qdaily  


4) Rec WC eval/ f/up; add CRISTIN Fruit Punch in 8oz H2O BID  


   for skin integrity  














Leon Moreira Aug 23, 2020 09:59

## 2020-08-23 NOTE — PULMONOLOGY PROGRESS NOTE
Subjective


ROS Limited/Unobtainable:  Yes


Interval Events:  COVID 19 test is negative


Constitutional:  Denies: fever


HEENT:  Repors: no symptoms


Respiratory:  Reports: no symptoms


Cardiovascular:  Reports: no symptoms


Gastrointestinal/Abdominal:  Reports: no symptoms


Allergies:  


Coded Allergies:  


     No Known Allergies (Unverified , 8/19/18)





Objective





Last 24 Hour Vital Signs








  Date Time  Temp Pulse Resp B/P (MAP) Pulse Ox O2 Delivery O2 Flow Rate FiO2


 


8/23/20 09:00      Room Air  


 


8/23/20 08:00 97.5 61 17 126/76 (93) 100   


 


8/23/20 04:00 96.7 68 18 129/78 (95) 97   


 


8/23/20 00:00 97.7 72 18 110/69 (83) 97   


 


8/22/20 21:00      Room Air  


 


8/22/20 20:00 97.9 82 18 122/77 (92) 97   


 


8/22/20 16:00 98.1 74 18 111/72 (85) 96   


 


8/22/20 12:00 97.7 62 20 109/54 (72) 97   


 


8/22/20 11:32  62      

















Intake and Output  


 


 8/22/20 8/23/20





 19:00 07:00


 


Intake Total 515 ml 450 ml


 


Output Total  1500 ml


 


Balance 515 ml -1050 ml


 


  


 


Intake Oral 240 ml 


 


IV Total 75 ml 450 ml


 


Other 200 ml 


 


Output Urine Total  1500 ml


 


# Bowel Movements  1








General Appearance:  no acute distress


HEENT:  normocephalic


Respiratory:  chest wall non-tender, lungs clear


Cardiovascular:  normal peripheral pulses


Abdomen:  normal bowel sounds





Current Medications








 Medications


  (Trade)  Dose


 Ordered  Sig/Kadeem


 Route


 PRN Reason  Start Time


 Stop Time Status Last Admin


Dose Admin


 


 Acetaminophen


  (Tylenol)  650 mg  Q4H  PRN


 ORAL


 MILD PAIN (1-3)/TEMP >100.5  8/22/20 15:15


 9/17/20 15:14   


 


 


 Aspirin


  (Ecotrin)  81 mg  DAILY


 ORAL


   8/23/20 09:00


 10/3/20 08:59  8/23/20 08:22


 


 


 Ceftriaxone


 Sodium 1 gm/


 Sodium Chloride  55 ml @ 


 110 mls/hr  Q24H


 IVPB


   8/23/20 09:00


 8/26/20 08:59  8/23/20 08:23


 


 


 Dextrose  1,000 ml @ 


 50 mls/hr  Q20H


 IV


   8/22/20 15:15


 9/20/20 12:29  8/23/20 05:29


 


 


 Hydralazine HCl


  (Apresoline)  25 mg  Q4H  PRN


 ORAL


 bp over 160 syst  8/22/20 15:30


 11/16/20 15:29   


 


 


 Pantoprazole


  (Protonix)  40 mg  EVERY 12  HOURS


 IVP


   8/22/20 21:00


 9/17/20 20:59  8/23/20 08:22


 


 


 Rifaximin


  (Xifaxan)  550 mg  TWICE A  DAY


 ORAL


   8/22/20 18:00


 8/25/20 17:59  8/23/20 08:22


 











Assessment/Plan


Assessment/Plan


IMPRESSION:


1. Ruled out for COVID-19 pneumonia.


2. Nursing home resident.


3. Psoriasis.


4. Dementia.


5. CVA.


6. History of hepatitis C.


7. CHF.





DISCUSSION:  Continue correction of electrolytes and


hypernatremia, fluid hydration.  Is negative for COVID 19; not hypoxic


I will follow as pulmonologist.














  ______________________________________________


  ANA Avalos Omar Syed MD Aug 23, 2020 10:47

## 2020-08-23 NOTE — INFECTIOUS DISEASES PROG NOTE
Assessment/Plan


Assessment/Plan


IMPRESSION:  


Sepsis 


UTI. treated


Lactic acidosis, 


Acute renal failure, 


Cirrhosis, 


Portal hypertension, 


Hepatitis C, 


Anemia, 


Dementia. 


Status post splenectomy.





RECOMMENDATION:  


Discontinue ceftriaxone.  .





Subjective


ROS Limited/Unobtainable:  Yes


Allergies:  


Coded Allergies:  


     No Known Allergies (Unverified , 8/19/18)





Objective





Last 24 Hour Vital Signs








  Date Time  Temp Pulse Resp B/P (MAP) Pulse Ox O2 Delivery O2 Flow Rate FiO2


 


8/23/20 09:00      Room Air  


 


8/23/20 08:00 97.5 61 17 126/76 (93) 100   


 


8/23/20 04:00 96.7 68 18 129/78 (95) 97   


 


8/23/20 00:00 97.7 72 18 110/69 (83) 97   


 


8/22/20 21:00      Room Air  


 


8/22/20 20:00 97.9 82 18 122/77 (92) 97   


 


8/22/20 16:00 98.1 74 18 111/72 (85) 96   








Height (Feet):  5


Height (Inches):  3.00


Weight (Pounds):  148


General Appearance:  no acute distress


HEENT:  mucous membranes moist


Respiratory/Chest:  lungs clear


Cardiovascular:  normal rate


Abdomen:  soft, non tender


Extremities:  no edema


Skin:  other - blisters & superficial ulcers of arms


Neurologic/Psychiatric:  alert, responsive





Current Medications








 Medications


  (Trade)  Dose


 Ordered  Sig/Kadeem


 Route


 PRN Reason  Start Time


 Stop Time Status Last Admin


Dose Admin


 


 Acetaminophen


  (Tylenol)  650 mg  Q4H  PRN


 ORAL


 MILD PAIN (1-3)/TEMP >100.5  8/22/20 15:15


 9/17/20 15:14   


 


 


 Aspirin


  (Ecotrin)  81 mg  DAILY


 ORAL


   8/23/20 09:00


 10/3/20 08:59  8/23/20 08:22


 


 


 Ceftriaxone


 Sodium 1 gm/


 Sodium Chloride  55 ml @ 


 110 mls/hr  Q24H


 IVPB


   8/23/20 09:00


 8/26/20 08:59  8/23/20 08:23


 


 


 Dextrose  1,000 ml @ 


 50 mls/hr  Q20H


 IV


   8/22/20 15:15


 9/20/20 12:29  8/23/20 11:15


 


 


 Hydralazine HCl


  (Apresoline)  25 mg  Q4H  PRN


 ORAL


 bp over 160 syst  8/22/20 15:30


 11/16/20 15:29   


 


 


 Pantoprazole


  (Protonix)  40 mg  EVERY 12  HOURS


 IVP


   8/22/20 21:00


 9/17/20 20:59  8/23/20 08:22


 


 


 Rifaximin


  (Xifaxan)  550 mg  TWICE A  DAY


 ORAL


   8/22/20 18:00


 8/25/20 17:59  8/23/20 08:22


 

















Jeremy Martin MD Aug 23, 2020 14:22

## 2020-08-23 NOTE — GENERAL PROGRESS NOTE
Assessment/Plan


Problem List:  


(1) Renal failure


ICD Codes:  N19 - Unspecified kidney failure


SNOMED:  64510523


Qualifiers:  


   Qualified Codes:  N17.9 - Acute kidney failure, unspecified


(2) Dehydration


ICD Codes:  E86.0 - Dehydration


SNOMED:  53649243


(3) Encephalopathy


ICD Codes:  G93.40 - Encephalopathy, unspecified


SNOMED:  97628882


(4) UTI (urinary tract infection)


ICD Codes:  N39.0 - Urinary tract infection, site not specified


SNOMED:  94404233


Qualifiers:  


   Qualified Codes:  N39.0 - Urinary tract infection, site not specified


(5) Dehydration


ICD Codes:  E86.0 - Dehydration


SNOMED:  42761512


(6) Electrolyte imbalance


ICD Codes:  E87.8 - Other disorders of electrolyte and fluid balance, not 

elsewhere classified


SNOMED:  181480624


(7) Sepsis


ICD Codes:  A41.9 - Sepsis, unspecified organism


SNOMED:  53366853


Qualifiers:  


   Qualified Codes:  A41.9 - Sepsis, unspecified organism; R65.20 - Severe 

sepsis without septic shock; G93.40 - Encephalopathy, unspecified


(8) MONICA (acute kidney injury)


ICD Codes:  N17.9 - Acute kidney failure, unspecified


SNOMED:  9511530, 26813357


Status:  progressing


Assessment/Plan:


sepsis and uti improving


obs


needs fluids


afebrile


lyte abnormality


arf' improving


hypernatremia improving





Subjective


ROS Limited/Unobtainable:  Yes


Allergies:  


Coded Allergies:  


     No Known Allergies (Unverified , 8/19/18)





Objective





Last 24 Hour Vital Signs








  Date Time  Temp Pulse Resp B/P (MAP) Pulse Ox O2 Delivery O2 Flow Rate FiO2


 


8/23/20 20:00 97.3 75 18 125/76 (92) 97   


 


8/23/20 16:00 96.8 63 20 112/65 (81) 97   


 


8/23/20 12:00 97.5 58 18 105/68 (80) 97   


 


8/23/20 09:00      Room Air  


 


8/23/20 08:00 97.5 61 17 126/76 (93) 100   


 


8/23/20 04:00 96.7 68 18 129/78 (95) 97   


 


8/23/20 00:00 97.7 72 18 110/69 (83) 97   

















Intake and Output  


 


 8/22/20 8/23/20





 19:00 07:00


 


Intake Total 515 ml 450 ml


 


Output Total  1500 ml


 


Balance 515 ml -1050 ml


 


  


 


Intake Oral 240 ml 


 


IV Total 75 ml 450 ml


 


Other 200 ml 


 


Output Urine Total  1500 ml


 


# Bowel Movements  1








Height (Feet):  5


Height (Inches):  3.00


Weight (Pounds):  148











Martin Payan MD Aug 23, 2020 21:16

## 2020-08-24 NOTE — INFECTIOUS DISEASES PROG NOTE
Assessment/Plan


Assessment/Plan


IMPRESSION:  


Sepsis 


UTI. treated


Lactic acidosis, 


Acute renal failure, 


Cirrhosis, 


Portal hypertension, 


Hepatitis C, 


Anemia, 


Dementia. 


Status post splenectomy.





RECOMMENDATION:  


Observe off of antibiotic  .





Subjective


ROS Limited/Unobtainable:  Yes


Constitutional:  Denies: fever


Allergies:  


Coded Allergies:  


     No Known Allergies (Unverified , 8/19/18)





Objective





Last 24 Hour Vital Signs








  Date Time  Temp Pulse Resp B/P (MAP) Pulse Ox O2 Delivery O2 Flow Rate FiO2


 


8/24/20 09:00      Room Air  


 


8/24/20 08:00 97.1 93 21 116/71 (86) 96   


 


8/24/20 04:00 97.2 57 18 136/83 (100) 96   


 


8/24/20 00:00 97.0 58 18 131/76 (94) 96   


 


8/23/20 21:00      Room Air  


 


8/23/20 20:00 97.3 75 18 125/76 (92) 97   


 


8/23/20 16:00 96.8 63 20 112/65 (81) 97   


 


8/23/20 12:00 97.5 58 18 105/68 (80) 97   








Height (Feet):  5


Height (Inches):  3.00


Weight (Pounds):  151


General Appearance:  no acute distress


HEENT:  mucous membranes moist


Respiratory/Chest:  lungs clear


Cardiovascular:  normal rate


Abdomen:  soft, non tender


Extremities:  no edema


Neurologic/Psychiatric:  other - sleeping





Laboratory Tests








Test


  8/24/20


07:55


 


White Blood Count


  6.6 K/UL


(4.8-10.8)


 


Red Blood Count


  3.09 M/UL


(4.70-6.10)  L


 


Hemoglobin


  9.3 G/DL


(14.2-18.0)  L


 


Hematocrit


  28.3 %


(42.0-52.0)  L


 


Mean Corpuscular Volume 91 FL (80-99)  


 


Mean Corpuscular Hemoglobin


  30.2 PG


(27.0-31.0)


 


Mean Corpuscular Hemoglobin


Concent 33.1 G/DL


(32.0-36.0)


 


Red Cell Distribution Width


  15.0 %


(11.6-14.8)  H


 


Platelet Count


  118 K/UL


(150-450)  L


 


Mean Platelet Volume


  6.2 FL


(6.5-10.1)  L


 


Neutrophils (%) (Auto)


  63.7 %


(45.0-75.0)


 


Lymphocytes (%) (Auto)


  19.8 %


(20.0-45.0)  L


 


Monocytes (%) (Auto)


  9.5 %


(1.0-10.0)


 


Eosinophils (%) (Auto)


  5.9 %


(0.0-3.0)  H


 


Basophils (%) (Auto)


  1.1 %


(0.0-2.0)


 


Sodium Level


  140 MMOL/L


(136-145)


 


Potassium Level


  3.6 MMOL/L


(3.5-5.1)


 


Chloride Level


  107 MMOL/L


()


 


Carbon Dioxide Level


  23 MMOL/L


(21-32)


 


Anion Gap


  10 mmol/L


(5-15)


 


Blood Urea Nitrogen


  10 mg/dL


(7-18)


 


Creatinine


  0.8 MG/DL


(0.55-1.30)


 


Estimat Glomerular Filtration


Rate > 60 mL/min


(>60)


 


Glucose Level


  129 MG/DL


()  H


 


Calcium Level


  8.9 MG/DL


(8.5-10.1)


 


Phosphorus Level


  3.6 MG/DL


(2.5-4.9)


 


Magnesium Level


  2.0 MG/DL


(1.8-2.4)


 


Total Bilirubin


  0.5 MG/DL


(0.2-1.0)


 


Aspartate Amino Transf


(AST/SGOT) 36 U/L (15-37)


 


 


Alanine Aminotransferase


(ALT/SGPT) 32 U/L (12-78)


 


 


Alkaline Phosphatase


  96 U/L


()


 


Total Protein


  7.0 G/DL


(6.4-8.2)


 


Albumin


  2.5 G/DL


(3.4-5.0)  L


 


Globulin 4.5 g/dL  


 


Albumin/Globulin Ratio


  0.6 (1.0-2.7)


L











Current Medications








 Medications


  (Trade)  Dose


 Ordered  Sig/Kadeem


 Route


 PRN Reason  Start Time


 Stop Time Status Last Admin


Dose Admin


 


 Acetaminophen


  (Tylenol)  650 mg  Q4H  PRN


 ORAL


 MILD PAIN (1-3)/TEMP >100.5  8/22/20 15:15


 9/17/20 15:14   


 


 


 Aspirin


  (Ecotrin)  81 mg  DAILY


 ORAL


   8/23/20 09:00


 10/3/20 08:59  8/24/20 08:25


 


 


 Dextrose  1,000 ml @ 


 50 mls/hr  Q20H


 IV


   8/22/20 15:15


 9/20/20 12:29  8/24/20 04:55


 


 


 Hydralazine HCl


  (Apresoline)  25 mg  Q4H  PRN


 ORAL


 bp over 160 syst  8/22/20 15:30


 11/16/20 15:29   


 


 


 Pantoprazole


  (Protonix)  40 mg  EVERY 12  HOURS


 IVP


   8/22/20 21:00


 9/17/20 20:59  8/24/20 08:25


 


 


 Rifaximin


  (Xifaxan)  550 mg  TWICE A  DAY


 ORAL


   8/22/20 18:00


 8/25/20 17:59  8/24/20 08:25


 

















Jeremy Martin MD Aug 24, 2020 11:34

## 2020-08-24 NOTE — SURGERY PROGRESS NOTE
Surgery Progress Note


Subjective


Additional Comments


no acute events


comfortable


stable


no n/v





Objective





Last 24 Hour Vital Signs








  Date Time  Temp Pulse Resp B/P (MAP) Pulse Ox O2 Delivery O2 Flow Rate FiO2


 


8/24/20 12:00 97.1 56 18 133/78 (96) 99   


 


8/24/20 09:00      Room Air  


 


8/24/20 08:00 97.1 93 21 116/71 (86) 96   


 


8/24/20 04:00 97.2 57 18 136/83 (100) 96   


 


8/24/20 00:00 97.0 58 18 131/76 (94) 96   


 


8/23/20 21:00      Room Air  


 


8/23/20 20:00 97.3 75 18 125/76 (92) 97   


 


8/23/20 16:00 96.8 63 20 112/65 (81) 97   








I&O











Intake and Output  


 


 8/23/20 8/24/20





 19:00 07:00


 


Intake Total 950 ml 650 ml


 


Output Total 500 ml 1500 ml


 


Balance 450 ml -850 ml


 


  


 


Intake Oral 900 ml 100 ml


 


IV Total 50 ml 550 ml


 


Output Urine Total 500 ml 1500 ml


 


# Bowel Movements 1 1








Dressing:  other


Wound:  other


Cardiovascular:  RSR


Respiratory:  decreased breath sounds


Abdomen:  soft, non-tender, present bowel sounds


Extremities:  no tenderness, no cyanosis





Laboratory Tests








Test


  8/24/20


07:55


 


White Blood Count


  6.6 K/UL


(4.8-10.8)


 


Red Blood Count


  3.09 M/UL


(4.70-6.10)  L


 


Hemoglobin


  9.3 G/DL


(14.2-18.0)  L


 


Hematocrit


  28.3 %


(42.0-52.0)  L


 


Mean Corpuscular Volume 91 FL (80-99)  


 


Mean Corpuscular Hemoglobin


  30.2 PG


(27.0-31.0)


 


Mean Corpuscular Hemoglobin


Concent 33.1 G/DL


(32.0-36.0)


 


Red Cell Distribution Width


  15.0 %


(11.6-14.8)  H


 


Platelet Count


  118 K/UL


(150-450)  L


 


Mean Platelet Volume


  6.2 FL


(6.5-10.1)  L


 


Neutrophils (%) (Auto)


  63.7 %


(45.0-75.0)


 


Lymphocytes (%) (Auto)


  19.8 %


(20.0-45.0)  L


 


Monocytes (%) (Auto)


  9.5 %


(1.0-10.0)


 


Eosinophils (%) (Auto)


  5.9 %


(0.0-3.0)  H


 


Basophils (%) (Auto)


  1.1 %


(0.0-2.0)


 


Sodium Level


  140 MMOL/L


(136-145)


 


Potassium Level


  3.6 MMOL/L


(3.5-5.1)


 


Chloride Level


  107 MMOL/L


()


 


Carbon Dioxide Level


  23 MMOL/L


(21-32)


 


Anion Gap


  10 mmol/L


(5-15)


 


Blood Urea Nitrogen


  10 mg/dL


(7-18)


 


Creatinine


  0.8 MG/DL


(0.55-1.30)


 


Estimat Glomerular Filtration


Rate > 60 mL/min


(>60)


 


Glucose Level


  129 MG/DL


()  H


 


Calcium Level


  8.9 MG/DL


(8.5-10.1)


 


Phosphorus Level


  3.6 MG/DL


(2.5-4.9)


 


Magnesium Level


  2.0 MG/DL


(1.8-2.4)


 


Total Bilirubin


  0.5 MG/DL


(0.2-1.0)


 


Aspartate Amino Transf


(AST/SGOT) 36 U/L (15-37)


 


 


Alanine Aminotransferase


(ALT/SGPT) 32 U/L (12-78)


 


 


Alkaline Phosphatase


  96 U/L


()


 


Total Protein


  7.0 G/DL


(6.4-8.2)


 


Albumin


  2.5 G/DL


(3.4-5.0)  L


 


Globulin 4.5 g/dL  


 


Albumin/Globulin Ratio


  0.6 (1.0-2.7)


L











Plan


Problems:  


(1) HTN (hypertension)


(2) Renal failure


(3) Dehydration


Assessment & Plan:  CURRENT DIET TEXTURE IS LEAST RESTRICTIVE AND AT THE TIME 

WITHOUT OVERT 


S/S OF ASPIRATION.





RECOMMENDATIONS;


1.  CONTINUE CURRENT DIET


2.  CONTINUE WITH SIMPLE COMMUNICATION TIPS TO INCREASE PATIENTS


    PARTICIPATION IN HIS MEDICAL CARE.


3.  ST TO FOLLOW FOR COMMUNICATION/DYSPHAGIA TX 





(4) Encephalopathy


(5) UTI (urinary tract infection)


(6) Dehydration


(7) Electrolyte imbalance


(8) Sepsis


(9) MONICA (acute kidney injury)


(10) Blister of left upper arm


Assessment & Plan:  DAILY ESTIMATED NEEDS:


Needs based on Cardiac, pulmonary 66.4kg  


25-30  kcals/kg 


6032-9547  total kcals


1.25-1.5  g protein/kg


  g total protein 


25-30  mL/kg


5751-4031  total fluid mLs





NUTRITION DIAGNOSIS:


Swallowing difficulty r/t dysphagia, h/o CVA, as evidenced by slp eval,


rec puree texture diet.





CURRENT DIET: Regular puree    





PO DIET RECOMMENDATIONS:


Now with good po intake, rec LOW NA diet/ texture per SLP  








ADDITIONAL RECOMMENDATIONS:


1) Maintain calibrated bed scale wts  


2) maintain IVF hydration 


3) Monitor for continued good po intake 


    -> add Ensure 1 bottle qdaily  


4) Rec WC eval/ f/up; add CRISTIN Fruit Punch in 8oz H2O BID  


   for skin integrity  














Leon Moreira Aug 24, 2020 12:32

## 2020-08-24 NOTE — PULMONOLOGY PROGRESS NOTE
Subjective


ROS Limited/Unobtainable:  Yes


Interval Events:  COVID 19 test is negative


Constitutional:  Denies: fever


HEENT:  Repors: no symptoms


Respiratory:  Reports: no symptoms


Cardiovascular:  Reports: no symptoms


Gastrointestinal/Abdominal:  Reports: no symptoms


Allergies:  


Coded Allergies:  


     No Known Allergies (Unverified , 8/19/18)





Objective





Last 24 Hour Vital Signs








  Date Time  Temp Pulse Resp B/P (MAP) Pulse Ox O2 Delivery O2 Flow Rate FiO2


 


8/24/20 09:00      Room Air  


 


8/24/20 08:00 97.1 93 21 116/71 (86) 96   


 


8/24/20 04:00 97.2 57 18 136/83 (100) 96   


 


8/24/20 00:00 97.0 58 18 131/76 (94) 96   


 


8/23/20 21:00      Room Air  


 


8/23/20 20:00 97.3 75 18 125/76 (92) 97   


 


8/23/20 16:00 96.8 63 20 112/65 (81) 97   


 


8/23/20 12:00 97.5 58 18 105/68 (80) 97   

















Intake and Output  


 


 8/23/20 8/24/20





 19:00 07:00


 


Intake Total 950 ml 650 ml


 


Output Total 500 ml 1500 ml


 


Balance 450 ml -850 ml


 


  


 


Intake Oral 900 ml 100 ml


 


IV Total 50 ml 550 ml


 


Output Urine Total 500 ml 1500 ml


 


# Bowel Movements 1 1








General Appearance:  no acute distress


HEENT:  normocephalic


Respiratory:  chest wall non-tender, lungs clear


Cardiovascular:  normal peripheral pulses


Abdomen:  normal bowel sounds


Laboratory Tests


8/24/20 07:55: 


White Blood Count 6.6, Red Blood Count 3.09L, Hemoglobin 9.3L, Hematocrit 28.3L

, Mean Corpuscular Volume 91, Mean Corpuscular Hemoglobin 30.2, Mean 

Corpuscular Hemoglobin Concent 33.1, Red Cell Distribution Width 15.0H, 

Platelet Count 118L, Mean Platelet Volume 6.2L, Neutrophils (%) (Auto) 63.7, 

Lymphocytes (%) (Auto) 19.8L, Monocytes (%) (Auto) 9.5, Eosinophils (%) (Auto) 

5.9H, Basophils (%) (Auto) 1.1, Sodium Level 140, Potassium Level 3.6, Chloride 

Level 107, Carbon Dioxide Level 23, Anion Gap 10, Blood Urea Nitrogen 10, 

Creatinine 0.8, Estimat Glomerular Filtration Rate > 60, Glucose Level 129H, 

Calcium Level 8.9, Phosphorus Level 3.6, Magnesium Level 2.0, Total Bilirubin 

0.5, Aspartate Amino Transf (AST/SGOT) 36, Alanine Aminotransferase (ALT/SGPT) 

32, Alkaline Phosphatase 96, Total Protein 7.0, Albumin 2.5L, Globulin 4.5, 

Albumin/Globulin Ratio 0.6L





Current Medications








 Medications


  (Trade)  Dose


 Ordered  Sig/Kadeem


 Route


 PRN Reason  Start Time


 Stop Time Status Last Admin


Dose Admin


 


 Acetaminophen


  (Tylenol)  650 mg  Q4H  PRN


 ORAL


 MILD PAIN (1-3)/TEMP >100.5  8/22/20 15:15


 9/17/20 15:14   


 


 


 Aspirin


  (Ecotrin)  81 mg  DAILY


 ORAL


   8/23/20 09:00


 10/3/20 08:59  8/24/20 08:25


 


 


 Dextrose  1,000 ml @ 


 50 mls/hr  Q20H


 IV


   8/22/20 15:15


 9/20/20 12:29  8/24/20 04:55


 


 


 Hydralazine HCl


  (Apresoline)  25 mg  Q4H  PRN


 ORAL


 bp over 160 syst  8/22/20 15:30


 11/16/20 15:29   


 


 


 Pantoprazole


  (Protonix)  40 mg  EVERY 12  HOURS


 IVP


   8/22/20 21:00


 9/17/20 20:59  8/24/20 08:25


 


 


 Rifaximin


  (Xifaxan)  550 mg  TWICE A  DAY


 ORAL


   8/22/20 18:00


 8/25/20 17:59  8/24/20 08:25


 











Assessment/Plan


Assessment/Plan


IMPRESSION:


1. Ruled out for COVID-19 pneumonia.


2. Nursing home resident.


3. Psoriasis.


4. Dementia.


5. CVA.


6. History of hepatitis C.


7. CHF.





DISCUSSION:  Continue correction of electrolytes and


hypernatremia, fluid hydration.  Is negative for COVID 19; not hypoxic


I will follow as pulmonologist.














  ______________________________________________


  ANA Avalos Omar Syed MD Aug 24, 2020 11:02

## 2020-08-24 NOTE — NEPHROLOGY PROGRESS NOTE
Assessment/Plan


Problem List:  


(1) MONICA (acute kidney injury)


(2) Electrolyte imbalance


(3) Dehydration


(4) UTI (urinary tract infection)


(5) Sepsis


(6) Encephalopathy


Assessment





Acute renal failure


Dehydration


Hypernatremia, likely due to free water deficit


Sepsis


UTI


Toxic metabolic encephalopathy


Hypoalbuminemia


Plan


August 24: Lab reviewed.  Stable renal parameters.  Continue per consultants.


August 23: No labs drawn today.  Stable from renal standpoint of view.


August 22: Stable from renal standpoint of view.  Continue per consultants.


August 21: Stable from renal standpoint of view.  Will order labs for tomorrow.

  Continue per consultants.


August 20: Labs reviewed.  Renal parameters are improved.  Electrolytes within 

normal range.  Continue as is.


August 19: Labs reviewed.  Patient has a Ndiaye catheter.  Serum creatinine down 

to 1.3 from 1.8 on admission.  Serum sodium remains elevated.  Will change IV 

to D5W.  We will keep the blood sugar and blood pressure in check.  Continue 

per consultants.





August 18:


Hold blood pressure medication due to low blood pressure


PRN blood pressure medication for high blood pressure


Today's labs appears to be an error and should be repeated


Slow hydration


Pulmonary toilet


Antibiotics per ID


Monitor renal parameters and electrolyte


Slow hydration


Urine studies





Subjective


ROS Limited/Unobtainable:  No


Constitutional:  Reports: malaise





Objective


Objective





Last 24 Hour Vital Signs








  Date Time  Temp Pulse Resp B/P (MAP) Pulse Ox O2 Delivery O2 Flow Rate FiO2


 


8/24/20 12:00 97.1 56 18 133/78 (96) 99   


 


8/24/20 09:00      Room Air  


 


8/24/20 08:00 97.1 93 21 116/71 (86) 96   


 


8/24/20 04:00 97.2 57 18 136/83 (100) 96   


 


8/24/20 00:00 97.0 58 18 131/76 (94) 96   


 


8/23/20 21:00      Room Air  


 


8/23/20 20:00 97.3 75 18 125/76 (92) 97   


 


8/23/20 16:00 96.8 63 20 112/65 (81) 97   

















Intake and Output  


 


 8/23/20 8/24/20





 19:00 07:00


 


Intake Total 950 ml 650 ml


 


Output Total 500 ml 1500 ml


 


Balance 450 ml -850 ml


 


  


 


Intake Oral 900 ml 100 ml


 


IV Total 50 ml 550 ml


 


Output Urine Total 500 ml 1500 ml


 


# Bowel Movements 1 1











Current Medications








 Medications


  (Trade)  Dose


 Ordered  Sig/Kadeem


 Route


 PRN Reason  Start Time


 Stop Time Status Last Admin


Dose Admin


 


 Acetaminophen


  (Tylenol)  650 mg  Q4H  PRN


 ORAL


 MILD PAIN (1-3)/TEMP >100.5  8/22/20 15:15


 9/17/20 15:14   


 


 


 Aspirin


  (Ecotrin)  81 mg  DAILY


 ORAL


   8/23/20 09:00


 10/3/20 08:59  8/24/20 08:25


 


 


 Dextrose  1,000 ml @ 


 50 mls/hr  Q20H


 IV


   8/22/20 15:15


 9/20/20 12:29  8/24/20 04:55


 


 


 Hydralazine HCl


  (Apresoline)  25 mg  Q4H  PRN


 ORAL


 bp over 160 syst  8/22/20 15:30


 11/16/20 15:29   


 


 


 Pantoprazole


  (Protonix)  40 mg  EVERY 12  HOURS


 IVP


   8/22/20 21:00


 9/17/20 20:59  8/24/20 08:25


 


 


 Rifaximin


  (Xifaxan)  550 mg  TWICE A  DAY


 ORAL


   8/22/20 18:00


 8/25/20 17:59  8/24/20 08:25


 





Laboratory Tests


8/24/20 07:55: 


White Blood Count 6.6, Red Blood Count 3.09L, Hemoglobin 9.3L, Hematocrit 28.3L

, Mean Corpuscular Volume 91, Mean Corpuscular Hemoglobin 30.2, Mean 

Corpuscular Hemoglobin Concent 33.1, Red Cell Distribution Width 15.0H, 

Platelet Count 118L, Mean Platelet Volume 6.2L, Neutrophils (%) (Auto) 63.7, 

Lymphocytes (%) (Auto) 19.8L, Monocytes (%) (Auto) 9.5, Eosinophils (%) (Auto) 

5.9H, Basophils (%) (Auto) 1.1, Sodium Level 140, Potassium Level 3.6, Chloride 

Level 107, Carbon Dioxide Level 23, Anion Gap 10, Blood Urea Nitrogen 10, 

Creatinine 0.8, Estimat Glomerular Filtration Rate > 60, Glucose Level 129H, 

Calcium Level 8.9, Phosphorus Level 3.6, Magnesium Level 2.0, Total Bilirubin 

0.5, Aspartate Amino Transf (AST/SGOT) 36, Alanine Aminotransferase (ALT/SGPT) 

32, Alkaline Phosphatase 96, Total Protein 7.0, Albumin 2.5L, Globulin 4.5, 

Albumin/Globulin Ratio 0.6L


Height (Feet):  5


Height (Inches):  3.00


Weight (Pounds):  151


General Appearance:  no apparent distress


EENT:  other - On room air


Cardiovascular:  other - Variable rate


Respiratory/Chest:  decreased breath sounds


Abdomen:  soft


Objective


No change











Alvin Alegria MD Aug 24, 2020 13:10

## 2020-08-25 NOTE — DISCHARGE SUMMARY
Discharge Summary


Discharge Summary


_


DATE OF ADMISSION 8/18/2020 


DATE OF DISCHARGE: 8/24/2020





DISCHARGED BY: Dr. Payan





REASON FOR ADMISSION: 


72 years old male, with past medical history of hepatitis C, hepatic 

encephalopathy, history of biliary stent placement, hypertension,  spinal 

stenosis, presented due to fever and hypotension.  


Patient was altered and unable to provide  any  information.  


Upon evaluation patient had low-grade fever 100, blood pressure was 88/40 , and 

pulse oximetry was stable on room air.  


Rapid COVID-19 was negative.  


Laboratory work-up revealed no leukocytosis ,hemoglobin 9.7, hematocrit 30.7 ,

platelet count 102.


Lactic acid 2.5.


Sodium 153, chloride 118.  


BUN 48, creatinine 1.8.  


Glucose 138.  


Troponin  0.075 , pro . 


AST 53 , ALT 33,  lipase 340.


Urinalysis revealed +2 protein , no evidence of urinary tract infection.


Chest x-ray demonstrated no acute cardiopulmonary pathology.  


Patient received aspirin ; septic work-up initiated , and patient admitted with

   sepsis,  UTI , elevated troponin,  renal failure, dehydration ,and 

encephalopathy.


 


CONSULTANTS:


cardiologist 


pulmonary Dr. Evans 


ID specialist Dr. Jeremy Martin


nephrologist Dr. Alegria


[facility] COURSE: 


Patient admitted to monitored floor.  


Two subsequent troponin  were negative. 


Initial elevated troponin was most likely due to renal failure.  


Echocardiogram revealed preserved ejection fraction of 65%.  


Patient noted to have bradycardia with right bundle branch block.  Heart rate 

was in the 50s.  


Patient was  off  any sinus or AV jonathan blocking agents. 


Initial hypotension improved with IV hydration.  


Patient was not on any antihypertensive medication at this time .


Hemodynamic status was closely monitored .


Hydralazine was on board as needed for blood pressure spikes.


Patient  started on empiric antibiotic for UTI and  completed treatment.  


Blood cultures were negative.  


Leukocytosis resolved , no fevers.  


Lactic acidosis  resolved


Renal parameters  and electrolytes were closely monitored,  electrolytes 

corrected as needed , and nephrotoxic's were avoided.  


Acute renal failure was most likely due to dehydration , and hypernatremia was 

due to free water deficit.


Acute renal failure resolved : creatinine down to 0.8 ,BUN down to 10 .  Sodium 

down to 140.   





FINAL DIAGNOSES: 


Sepsis


UTI


Toxic metabolic encephalopathy


Acute renal failure-resolved 


Hypernatremia due to free water deficit -resolved 


Dehydration


Lactic acidosis -resolved 


Elevated troponin likely due to renal failure -resolved 


Bradycardia and right bundle branch block


Cirrhosis


Portal hypertension


Hepatitis C


History of hypertension


Anemia


Dysphagia


Suspected COVID-19 infection -  ruled out


Dementia


CVA


 





DISCHARGE MEDICATIONS:


See Medication Reconciliation list.





DISCHARGE INSTRUCTIONS:


Patient was discharged to the skilled nursing facility. 


Follow up with medical doctor at the facility.





I have been assigned to dictate discharge summary for this account. 


I was not involved in the patient's management.











Mariola Pedro NP Aug 25, 2020 12:30

## 2021-05-14 NOTE — DISCHARGE SUMMARY
Discharge Summary


Discharge Summary


_


DATE OF ADMISSION: 8/7/2019





DATE OF DISCHARGE: 8/8/2019








Patient left AGAINST MEDICAL ADVICE





REASON FOR ADMISSION: 


71 years old male with past medical history of hypertension, 

hypercholesterolemia, back surgery, smoker, presented with  complaint of chest 

pain with radiation to the left arm.  


Pain reported as continuous, 9 out of 10 on a scale 1-10.  


Exacerbating factors appeared to be with movement of the left arm and left 

shoulder, but not with walking.  


The pain was worse in certain positions,   mostly when he moves his  left arm . 

When the left arm  was flat , he had no pain or pain improved.


Patient had a prior admission in August of last year also with chest pain.


At that time patient was ruled out for acute MI and underwent myocardial 

perfusion scan test, which was negative with calculated ejection fraction of 

greater than 70%.


Upon evaluation laboratory work-up revealed no leukocytosis, hemoglobin 10.6.


Platelet count 92.


Potassium 3.5.


Stable renal parameters.


Troponin negative.  EKG revealed sinus rhythm with right bundle branch block.  


Patient subsequently was admitted with chest pain,  rule out myocardial 

infarction.


 


CONSULTANTS:


cardiologist Dr. Whelan


 


 


[facility] COURSE: 


Patient admitted to telemetry floor.  


Cardiologist followed.


Serial troponin were negative.  


EKG revealed  right bundle-branch conduction defect with secondary ST-segment 

changes. There were some T-wave inversions in lead III and aVF.  


In direct comparison with the EKG performed in August of 2018,  ST-segment 

changes  appeared to be old.  


Patient was ruled out for acute MI.


Lipid panel was stable.  


TSH was within normal limits.  


Patient was on antiplatelet therapy  with Aspirin.   


GI prophylaxis provided.  


Blood pressure was closely monitored.


 


Patient had   evidence of heart murmur on clinical examination.


Echocardiogram revealed preserved ejection fraction with mild left ventricular 

hypertrophy.  Right ventricular systolic pressure of 27.  


Evidence of aortic stenosis.


Per cardiology,  chest pain was likely musculoskeletal in origin.  Relieving  

and exacerbated factors along with   reproduction of pain were all consistent 

with probable   musculoskeletal pain.  





Patient started on nonsteroidal anti-inflammatory medication/ibuprofen.  


Patient was counseled on smoking cessation.


Discharge planning was   in process.  


However,  patient decided to leave AGAINST MEDICAL ADVICE before discharge 

order was written. 


The risks and consequences of signing AGAINST MEDICAL ADVICE were discussed 

with patient in detail.  


Patient verbalized understanding, nevertheless signed AMA form and left.





FINAL DIAGNOSES: 


Atypical chest pain,  likely musculoskeletal in origin


History of hypertension


Aortic stenosis


Tobacco use disorder 


 





 





I have been assigned to dictate discharge summary for this account. 


I was not involved in the patient's management.











Mariola Pedro NP Aug 12, 2019 08:00
na

## 2021-05-25 NOTE — NUR
NURSE NOTES:

Zelda obregon at 15:00 Pt reassessed. Cardiac monitoring continued. CCTA performed. will continue to monitor.

## 2021-07-20 NOTE — NUR
NURSE NOTES:

Patient was picked up by [facility] personnel. He was discharged to [facility] in stable condition. Prior to discharge, patient's v/s was stable. Denied any pain 
or discomfort. Skin assessment done noted with no new skin issue. IV and ID were removed. No 
s/s of infection on IV removal site. Discharge instruction given to nursing home nurse 
Adam to resume nursing home meds.All belongings accounted for and sent with patient and 
upper denture was worn by patient. Jabari Reese patient's family was informed about discharge. Symptoms of retinal detachment and endophthalmitis following intravitreal injection discussed; patient advised to call immediately if symptoms ensue.

## 2025-01-21 NOTE — EMERGENCY ROOM REPORT
History of Present Illness


General


Chief Complaint:  Chest Pain


Source:  Patient





Present Illness


HPI


This is a 71-year-old Maced[place] speaking male with a history of hypertension.  He 

presents with chief complaint of chest pain.  He said he woke up with chest 

pain 2 hours prior to arrival.  Mid sternal area.  No radiation.  He said it 

did not go away so he went to [facility] where he knew the worker there.  That person 

called 911.  EMS gave him aspirin and nitro without much relief.  No nausea no 

vomiting.  No fever chills.  No radiation.  Pain is sharp in nature.  7 out of 

10.  Similar symptom 7 8 months ago when he went to another hospital.  He was 

admitted here for chest pain and was ruled out.


Allergies:  


Coded Allergies:  


     No Known Allergies (Unverified , 8/19/18)





Patient History


Past Medical History:  see triage record, old chart reviewed, HTN


Past Surgical History:  none


Pertinent Family History:  none


Social History:  Denies: smoking


Immunizations:  other


Reviewed Nursing Documentation:  PMH: Agreed; PSxH: Agreed





Nursing Documentation-PMH


Past Medical History:  No History, Except For


Hx Hypertension:  Yes


Hx Cancer:  No


Hx Gastrointestinal Problems:  No


Hx Neurological Problems:  No





Review of Systems


Eye:  Denies: eye pain, blurred vision


ENT:  Denies: ear pain, nose congestion, throat swelling


Respiratory:  Denies: cough, shortness of breath


Cardiovascular:  Reports: chest pain; Denies: palpitations


Gastrointestinal:  Denies: abdominal pain, diarrhea, nausea, vomiting


Musculoskeletal:  Denies: back pain, joint pain


Skin:  Denies: rash


Neurological:  Denies: headache, numbness


Endocrine:  Denies: increased thirst, increased urine


Hematologic/Lymphatic:  Denies: easy bruising


All Other Systems:  negative except mentioned in HPI





Physical Exam





Vital Signs








  Date Time  Temp Pulse Resp B/P (MAP) Pulse Ox O2 Delivery O2 Flow Rate FiO2


 


8/7/19 04:01 98.1 85 16 143/80 (101) 96 Room Air  





Vitals with mild hypertension


Sp02 EP Interpretation:  reviewed, normal


General Appearance:  well appearing, no apparent distress, alert


Head:  normocephalic, atraumatic


Eyes:  bilateral eye PERRL, bilateral eye EOMI


ENT:  hearing grossly normal, normal pharynx


Neck:  full range of motion, supple, no meningismus


Respiratory:  chest non-tender, lungs clear, normal breath sounds


Cardiovascular #1:  regular rate, rhythm, no murmur


Gastrointestinal:  normal bowel sounds, non tender, no mass, no organomegaly, 

no bruit, non-distended


Musculoskeletal:  back normal, gait/station normal, normal range of motion


Psychiatric:  mood/affect normal





Medical Decision Making


Diagnostic Impression:  


 Primary Impression:  


 Chest pain


 Qualified Codes:  R07.9 - Chest pain, unspecified


ER Course


Patient presents with chest pain.  History is a little atypical but he has T 

wave inversion laterally.  First set of troponin negative.  He does have risk 

factor with history of smoking, age, high blood pressure.  Pain is well 

controlled now.  Patient will be admitted for chest pain/MI ruled out and 

further work-up.  I discussed the case with Dr. Martin who will admit.


EKG Diagnostic Results


Rate:  normal


Rhythm:  NSR


ST Segments:  other - RBBB with NSST changes





Rhythm Strip Diag. Results


EP Interpretation:  yes


Rate:  81


Rhythm:  NSR





Chest X-Ray Diagnostic Results


Chest X-Ray Diagnostic Results :  


   Chest X-Ray Ordered:  Yes


   # of Views/Limited/Complete:  1 View


   Indication:  Chest Pain


   EP Interpretation:  Yes


   Interpretation:  no consolidation, no effusion, no pneumothorax


   Impression:  No acute disease


   Electronically Signed by:  Chay Ma MD





Last Vital Signs








  Date Time  Temp Pulse Resp B/P (MAP) Pulse Ox O2 Delivery O2 Flow Rate FiO2


 


8/7/19 04:01 98.1 85 16 143/80 (101) 96 Room Air  








Status:  improved


Disposition:  ADMITTED AS INPATIENT


Condition:  Serious


Scripts


Unable to Obtain Active Prescriptions or Reported Meds











Chya Ma MD Aug 7, 2019 04:20 97.8